# Patient Record
Sex: MALE | Race: BLACK OR AFRICAN AMERICAN | Employment: OTHER | URBAN - METROPOLITAN AREA
[De-identification: names, ages, dates, MRNs, and addresses within clinical notes are randomized per-mention and may not be internally consistent; named-entity substitution may affect disease eponyms.]

---

## 2018-05-07 ENCOUNTER — HOSPITAL ENCOUNTER (OUTPATIENT)
Age: 83
Discharge: HOME | End: 2018-05-07
Payer: MEDICARE

## 2018-05-07 LAB
ALBUMIN SERPL-MCNC: 4.4 G/DL (ref 3.5–5.2)
ALP BLD-CCNC: 97 U/L (ref 40–129)
ALT SERPL-CCNC: 15 U/L (ref 0–40)
AMYLASE: 145 U/L (ref 20–100)
ANION GAP SERPL CALCULATED.3IONS-SCNC: 12 MMOL/L (ref 7–16)
AST SERPL-CCNC: 26 U/L (ref 0–39)
BASOPHILS ABSOLUTE: 0.03 E9/L (ref 0–0.2)
BASOPHILS RELATIVE PERCENT: 0.3 % (ref 0–2)
BILIRUB SERPL-MCNC: 0.7 MG/DL (ref 0–1.2)
BUN BLDV-MCNC: 31 MG/DL (ref 8–23)
CALCIUM SERPL-MCNC: 10.2 MG/DL (ref 8.6–10.2)
CHLORIDE BLD-SCNC: 101 MMOL/L (ref 98–107)
CO2: 28 MMOL/L (ref 22–29)
CREAT SERPL-MCNC: 1.5 MG/DL (ref 0.7–1.2)
EOSINOPHILS ABSOLUTE: 0.05 E9/L (ref 0.05–0.5)
EOSINOPHILS RELATIVE PERCENT: 0.6 % (ref 0–6)
GFR AFRICAN AMERICAN: 53
GFR NON-AFRICAN AMERICAN: 53 ML/MIN/1.73
GLUCOSE BLD-MCNC: 97 MG/DL (ref 74–109)
HCT VFR BLD CALC: 36.5 % (ref 37–54)
HEMOGLOBIN: 11.8 G/DL (ref 12.5–16.5)
IMMATURE GRANULOCYTES #: 0.02 E9/L
IMMATURE GRANULOCYTES %: 0.2 % (ref 0–5)
INR BLD: 1
LIPASE: 35 U/L (ref 13–60)
LYMPHOCYTES ABSOLUTE: 1.04 E9/L (ref 1.5–4)
LYMPHOCYTES RELATIVE PERCENT: 11.8 % (ref 20–42)
MCH RBC QN AUTO: 30 PG (ref 26–35)
MCHC RBC AUTO-ENTMCNC: 32.3 % (ref 32–34.5)
MCV RBC AUTO: 92.9 FL (ref 80–99.9)
MONOCYTES ABSOLUTE: 0.63 E9/L (ref 0.1–0.95)
MONOCYTES RELATIVE PERCENT: 7.1 % (ref 2–12)
NEUTROPHILS ABSOLUTE: 7.07 E9/L (ref 1.8–7.3)
NEUTROPHILS RELATIVE PERCENT: 80 % (ref 43–80)
PDW BLD-RTO: 14.1 FL (ref 11.5–15)
PLATELET # BLD: 196 E9/L (ref 130–450)
PMV BLD AUTO: 11 FL (ref 7–12)
POTASSIUM SERPL-SCNC: 3.9 MMOL/L (ref 3.5–5)
PROTHROMBIN TIME: 12.1 SEC (ref 9.3–12.4)
RBC # BLD: 3.93 E12/L (ref 3.8–5.8)
SODIUM BLD-SCNC: 141 MMOL/L (ref 132–146)
TOTAL PROTEIN: 7.8 G/DL (ref 6.4–8.3)
WBC # BLD: 8.8 E9/L (ref 4.5–11.5)

## 2018-05-07 PROCEDURE — 80053 COMPREHEN METABOLIC PANEL: CPT

## 2018-05-07 PROCEDURE — 82105 ALPHA-FETOPROTEIN SERUM: CPT

## 2018-05-07 PROCEDURE — 85025 COMPLETE CBC W/AUTO DIFF WBC: CPT

## 2018-05-07 PROCEDURE — 36415 COLL VENOUS BLD VENIPUNCTURE: CPT

## 2018-05-07 PROCEDURE — 82150 ASSAY OF AMYLASE: CPT

## 2018-05-07 PROCEDURE — 85610 PROTHROMBIN TIME: CPT

## 2018-05-07 PROCEDURE — 83690 ASSAY OF LIPASE: CPT

## 2018-05-10 LAB — AFP-TUMOR MARKER: 1 NG/ML (ref 0–9)

## 2018-08-27 ENCOUNTER — OFFICE VISIT (OUTPATIENT)
Dept: CARDIOLOGY CLINIC | Age: 83
End: 2018-08-27
Payer: MEDICARE

## 2018-08-27 VITALS
RESPIRATION RATE: 12 BRPM | DIASTOLIC BLOOD PRESSURE: 64 MMHG | HEART RATE: 62 BPM | HEIGHT: 66 IN | BODY MASS INDEX: 26.03 KG/M2 | SYSTOLIC BLOOD PRESSURE: 112 MMHG | WEIGHT: 162 LBS

## 2018-08-27 DIAGNOSIS — I25.10 CORONARY ARTERY DISEASE INVOLVING NATIVE CORONARY ARTERY OF NATIVE HEART WITHOUT ANGINA PECTORIS: Primary | ICD-10-CM

## 2018-08-27 PROCEDURE — 99213 OFFICE O/P EST LOW 20 MIN: CPT | Performed by: INTERNAL MEDICINE

## 2018-08-27 PROCEDURE — 93000 ELECTROCARDIOGRAM COMPLETE: CPT | Performed by: INTERNAL MEDICINE

## 2018-08-27 NOTE — PROGRESS NOTES
[facility]  Gabe Patel M.D. Dona Nelson. Nany Iglesias M.D. Baldev Maria Caro, M.D.  Radha Green. [employer] CompanyMELISSA Velda Basset, M.D. Armen Villarreal M.D.                Karina Arshadapp   8/11/1929  Leandra Leigh MD    This 68-year-old man had outpatient cardiac assessment today. He has a history of hypertension and chronic ischemic heart disease. He has a  history of hypertension and hypertensive heart disease. He has had COPD. He states that except for occasional Rosalea Lowers" that he feels well and goes about his normal activities without symptomatic or functional limitation. He is a  of 9 years. He worked 45 years in the [employer] History:  1. Admission [facility], 2004 with dyspnea and edema. 2. Echo, 2004. Concentric LVH, mild diffuse hypokinesis, EF 50%, Stage II diastolic dysfunction and elevated LA pressure.    3. MPS, 2004. EF 50%, mild global hypokinesis, partially reversible anterior perfusion abnormality. 4. Cardiac catheterization [facility], 08/2004. Normal wall motion, EF 61%, 60% D1 narrowing, 25% OM2 narrowing, 80% narrowing of a RV branch of the dominant RCA.    5. Hypertension with EKG revealing NSR, LVH with repolarization changes.    6. Hemorrhoidectomy.    7. Family history negative for premature vascular disease. 8. Patient smoked for a number of years, but has been abstinent since 1980.    9. No drug allergies.    10. Echo, 12/13/2007. LA 4.2. LVH. EF >55%. RVSP 27. E/E' 12.89.    11. Dobutrex, 01/09/2008. No ST changes from baseline, no chest pain, gated images normal, EF 51%, small partially reversible inferior perfusion abnormality. TID noted. However, abnormalities are felt to be artifactual due to marked gut uptake of radioisotope. Low to intermediate risk study.    12. Mild anemia. Hemoglobin 11.8, WBC 6.8, platelet count [phone]  05/2010. 13. Cataract surgery OD, 08/19/2011. Cataract surgery OS, 10/06/2011. 14.  EGD Dr. Rufino Cueto,

## 2018-09-11 ENCOUNTER — HOSPITAL ENCOUNTER (OUTPATIENT)
Age: 83
Discharge: HOME | End: 2018-09-11
Payer: MEDICARE

## 2018-09-11 LAB
ALBUMIN SERPL-MCNC: 4.2 G/DL (ref 3.5–5.2)
ALP BLD-CCNC: 93 U/L (ref 40–129)
ALT SERPL-CCNC: 12 U/L (ref 0–40)
AMYLASE: 151 U/L (ref 20–100)
ANION GAP SERPL CALCULATED.3IONS-SCNC: 12 MMOL/L (ref 7–16)
AST SERPL-CCNC: 21 U/L (ref 0–39)
BASOPHILS ABSOLUTE: 0.04 E9/L (ref 0–0.2)
BASOPHILS RELATIVE PERCENT: 0.5 % (ref 0–2)
BILIRUB SERPL-MCNC: 0.5 MG/DL (ref 0–1.2)
BUN BLDV-MCNC: 24 MG/DL (ref 8–23)
CALCIUM SERPL-MCNC: 9.8 MG/DL (ref 8.6–10.2)
CHLORIDE BLD-SCNC: 100 MMOL/L (ref 98–107)
CO2: 28 MMOL/L (ref 22–29)
CREAT SERPL-MCNC: 1.3 MG/DL (ref 0.7–1.2)
EOSINOPHILS ABSOLUTE: 0.07 E9/L (ref 0.05–0.5)
EOSINOPHILS RELATIVE PERCENT: 0.9 % (ref 0–6)
GFR AFRICAN AMERICAN: >60
GFR NON-AFRICAN AMERICAN: >60 ML/MIN/1.73
GLUCOSE BLD-MCNC: 97 MG/DL (ref 74–109)
HCT VFR BLD CALC: 38.1 % (ref 37–54)
HEMOGLOBIN: 12.2 G/DL (ref 12.5–16.5)
IMMATURE GRANULOCYTES #: 0.02 E9/L
IMMATURE GRANULOCYTES %: 0.2 % (ref 0–5)
INR BLD: 1.1
LIPASE: 34 U/L (ref 13–60)
LYMPHOCYTES ABSOLUTE: 0.9 E9/L (ref 1.5–4)
LYMPHOCYTES RELATIVE PERCENT: 10.9 % (ref 20–42)
MCH RBC QN AUTO: 29.4 PG (ref 26–35)
MCHC RBC AUTO-ENTMCNC: 32 % (ref 32–34.5)
MCV RBC AUTO: 91.8 FL (ref 80–99.9)
MONOCYTES ABSOLUTE: 0.66 E9/L (ref 0.1–0.95)
MONOCYTES RELATIVE PERCENT: 8 % (ref 2–12)
NEUTROPHILS ABSOLUTE: 6.53 E9/L (ref 1.8–7.3)
NEUTROPHILS RELATIVE PERCENT: 79.5 % (ref 43–80)
PDW BLD-RTO: 13.5 FL (ref 11.5–15)
PLATELET # BLD: 119 E9/L (ref 130–450)
PMV BLD AUTO: 12.3 FL (ref 7–12)
POTASSIUM SERPL-SCNC: 4.5 MMOL/L (ref 3.5–5)
PROTHROMBIN TIME: 12 SEC (ref 9.3–12.4)
RBC # BLD: 4.15 E12/L (ref 3.8–5.8)
SODIUM BLD-SCNC: 140 MMOL/L (ref 132–146)
TOTAL PROTEIN: 7.4 G/DL (ref 6.4–8.3)
WBC # BLD: 8.2 E9/L (ref 4.5–11.5)

## 2018-09-11 PROCEDURE — 85025 COMPLETE CBC W/AUTO DIFF WBC: CPT

## 2018-09-11 PROCEDURE — 36415 COLL VENOUS BLD VENIPUNCTURE: CPT

## 2018-09-11 PROCEDURE — 82150 ASSAY OF AMYLASE: CPT

## 2018-09-11 PROCEDURE — 80053 COMPREHEN METABOLIC PANEL: CPT

## 2018-09-11 PROCEDURE — 85610 PROTHROMBIN TIME: CPT

## 2018-09-11 PROCEDURE — 83690 ASSAY OF LIPASE: CPT

## 2018-10-23 ENCOUNTER — HOSPITAL ENCOUNTER (OUTPATIENT)
Age: 83
Discharge: HOME | End: 2018-10-23
Payer: MEDICARE

## 2018-10-23 LAB
ALBUMIN SERPL-MCNC: 4.1 G/DL (ref 3.5–5.2)
ALP BLD-CCNC: 93 U/L (ref 40–129)
ALT SERPL-CCNC: 15 U/L (ref 0–40)
AMYLASE: 83 U/L (ref 20–100)
ANION GAP SERPL CALCULATED.3IONS-SCNC: 13 MMOL/L (ref 7–16)
AST SERPL-CCNC: 24 U/L (ref 0–39)
BASOPHILS ABSOLUTE: 0.03 E9/L (ref 0–0.2)
BASOPHILS RELATIVE PERCENT: 0.3 % (ref 0–2)
BILIRUB SERPL-MCNC: 0.6 MG/DL (ref 0–1.2)
BUN BLDV-MCNC: 26 MG/DL (ref 8–23)
CALCIUM SERPL-MCNC: 9.9 MG/DL (ref 8.6–10.2)
CHLORIDE BLD-SCNC: 100 MMOL/L (ref 98–107)
CO2: 26 MMOL/L (ref 22–29)
CREAT SERPL-MCNC: 1.6 MG/DL (ref 0.7–1.2)
EOSINOPHILS ABSOLUTE: 0.06 E9/L (ref 0.05–0.5)
EOSINOPHILS RELATIVE PERCENT: 0.7 % (ref 0–6)
GFR AFRICAN AMERICAN: 49
GFR NON-AFRICAN AMERICAN: 49 ML/MIN/1.73
GLUCOSE BLD-MCNC: 103 MG/DL (ref 74–109)
HCT VFR BLD CALC: 36.4 % (ref 37–54)
HEMOGLOBIN: 11.4 G/DL (ref 12.5–16.5)
IMMATURE GRANULOCYTES #: 0.03 E9/L
IMMATURE GRANULOCYTES %: 0.3 % (ref 0–5)
LIPASE: 36 U/L (ref 13–60)
LYMPHOCYTES ABSOLUTE: 0.72 E9/L (ref 1.5–4)
LYMPHOCYTES RELATIVE PERCENT: 8.3 % (ref 20–42)
MCH RBC QN AUTO: 28.8 PG (ref 26–35)
MCHC RBC AUTO-ENTMCNC: 31.3 % (ref 32–34.5)
MCV RBC AUTO: 91.9 FL (ref 80–99.9)
MONOCYTES ABSOLUTE: 0.69 E9/L (ref 0.1–0.95)
MONOCYTES RELATIVE PERCENT: 8 % (ref 2–12)
NEUTROPHILS ABSOLUTE: 7.11 E9/L (ref 1.8–7.3)
NEUTROPHILS RELATIVE PERCENT: 82.4 % (ref 43–80)
PDW BLD-RTO: 13.9 FL (ref 11.5–15)
PLATELET # BLD: 185 E9/L (ref 130–450)
PMV BLD AUTO: 11.2 FL (ref 7–12)
POTASSIUM SERPL-SCNC: 4.1 MMOL/L (ref 3.5–5)
RBC # BLD: 3.96 E12/L (ref 3.8–5.8)
SODIUM BLD-SCNC: 139 MMOL/L (ref 132–146)
TOTAL PROTEIN: 7.7 G/DL (ref 6.4–8.3)
WBC # BLD: 8.6 E9/L (ref 4.5–11.5)

## 2018-10-23 PROCEDURE — 80053 COMPREHEN METABOLIC PANEL: CPT

## 2018-10-23 PROCEDURE — 36415 COLL VENOUS BLD VENIPUNCTURE: CPT

## 2018-10-23 PROCEDURE — 85025 COMPLETE CBC W/AUTO DIFF WBC: CPT

## 2018-10-23 PROCEDURE — 83690 ASSAY OF LIPASE: CPT

## 2018-10-23 PROCEDURE — 82150 ASSAY OF AMYLASE: CPT

## 2019-01-01 ENCOUNTER — TELEPHONE (OUTPATIENT)
Dept: CARDIOLOGY CLINIC | Age: 84
End: 2019-01-01

## 2019-01-01 ENCOUNTER — APPOINTMENT (OUTPATIENT)
Dept: GENERAL RADIOLOGY | Age: 84
DRG: 291 | End: 2019-01-01
Payer: MEDICARE

## 2019-01-01 ENCOUNTER — HOSPITAL ENCOUNTER (INPATIENT)
Age: 84
LOS: 4 days | Discharge: HOME | DRG: 069 | End: 2019-09-02
Attending: EMERGENCY MEDICINE | Admitting: INTERNAL MEDICINE
Payer: MEDICARE

## 2019-01-01 ENCOUNTER — HOSPITAL ENCOUNTER (INPATIENT)
Age: 84
LOS: 7 days | Discharge: HOME | DRG: 291 | End: 2019-08-04
Attending: EMERGENCY MEDICINE | Admitting: INTERNAL MEDICINE
Payer: MEDICARE

## 2019-01-01 ENCOUNTER — APPOINTMENT (OUTPATIENT)
Dept: GENERAL RADIOLOGY | Age: 84
DRG: 069 | End: 2019-01-01
Payer: MEDICARE

## 2019-01-01 ENCOUNTER — APPOINTMENT (OUTPATIENT)
Dept: ULTRASOUND IMAGING | Age: 84
DRG: 069 | End: 2019-01-01
Payer: MEDICARE

## 2019-01-01 ENCOUNTER — APPOINTMENT (OUTPATIENT)
Dept: ULTRASOUND IMAGING | Age: 84
DRG: 291 | End: 2019-01-01
Payer: MEDICARE

## 2019-01-01 ENCOUNTER — ANESTHESIA (OUTPATIENT)
Dept: CARDIAC CATH/INVASIVE PROCEDURES | Age: 84
DRG: 291 | End: 2019-01-01
Payer: MEDICARE

## 2019-01-01 ENCOUNTER — APPOINTMENT (OUTPATIENT)
Dept: NON INVASIVE DIAGNOSTICS | Age: 84
DRG: 291 | End: 2019-01-01
Payer: MEDICARE

## 2019-01-01 ENCOUNTER — ANESTHESIA EVENT (OUTPATIENT)
Dept: CARDIAC CATH/INVASIVE PROCEDURES | Age: 84
DRG: 291 | End: 2019-01-01
Payer: MEDICARE

## 2019-01-01 ENCOUNTER — OFFICE VISIT (OUTPATIENT)
Dept: CARDIOLOGY CLINIC | Age: 84
End: 2019-01-01
Payer: MEDICARE

## 2019-01-01 ENCOUNTER — APPOINTMENT (OUTPATIENT)
Dept: NUCLEAR MEDICINE | Age: 84
DRG: 291 | End: 2019-01-01
Payer: MEDICARE

## 2019-01-01 ENCOUNTER — APPOINTMENT (OUTPATIENT)
Dept: CT IMAGING | Age: 84
DRG: 069 | End: 2019-01-01
Payer: MEDICARE

## 2019-01-01 ENCOUNTER — APPOINTMENT (OUTPATIENT)
Dept: CARDIAC CATH/INVASIVE PROCEDURES | Age: 84
DRG: 291 | End: 2019-01-01
Payer: MEDICARE

## 2019-01-01 VITALS
BODY MASS INDEX: 22.02 KG/M2 | WEIGHT: 137 LBS | OXYGEN SATURATION: 96 % | HEIGHT: 66 IN | SYSTOLIC BLOOD PRESSURE: 115 MMHG | HEART RATE: 79 BPM | DIASTOLIC BLOOD PRESSURE: 51 MMHG | TEMPERATURE: 97.5 F | RESPIRATION RATE: 18 BRPM

## 2019-01-01 VITALS
BODY MASS INDEX: 22.5 KG/M2 | SYSTOLIC BLOOD PRESSURE: 150 MMHG | OXYGEN SATURATION: 100 % | WEIGHT: 140 LBS | DIASTOLIC BLOOD PRESSURE: 78 MMHG | RESPIRATION RATE: 20 BRPM | TEMPERATURE: 98.5 F | HEIGHT: 66 IN | HEART RATE: 84 BPM

## 2019-01-01 VITALS
WEIGHT: 138 LBS | HEART RATE: 62 BPM | SYSTOLIC BLOOD PRESSURE: 108 MMHG | BODY MASS INDEX: 22.18 KG/M2 | DIASTOLIC BLOOD PRESSURE: 50 MMHG | HEIGHT: 66 IN | RESPIRATION RATE: 16 BRPM

## 2019-01-01 VITALS
SYSTOLIC BLOOD PRESSURE: 111 MMHG | RESPIRATION RATE: 29 BRPM | OXYGEN SATURATION: 99 % | DIASTOLIC BLOOD PRESSURE: 59 MMHG

## 2019-01-01 DIAGNOSIS — I25.10 CORONARY ARTERY DISEASE INVOLVING NATIVE CORONARY ARTERY OF NATIVE HEART WITHOUT ANGINA PECTORIS: Primary | ICD-10-CM

## 2019-01-01 DIAGNOSIS — R77.8 ELEVATED TROPONIN: ICD-10-CM

## 2019-01-01 DIAGNOSIS — I50.9 ACUTE ON CHRONIC CONGESTIVE HEART FAILURE, UNSPECIFIED HEART FAILURE TYPE (HCC): Primary | ICD-10-CM

## 2019-01-01 DIAGNOSIS — R33.9 URINARY RETENTION: ICD-10-CM

## 2019-01-01 DIAGNOSIS — E87.20 LACTIC ACIDOSIS: ICD-10-CM

## 2019-01-01 DIAGNOSIS — I48.91 NEW ONSET ATRIAL FIBRILLATION (HCC): ICD-10-CM

## 2019-01-01 DIAGNOSIS — G45.9 TIA (TRANSIENT ISCHEMIC ATTACK): Primary | ICD-10-CM

## 2019-01-01 DIAGNOSIS — N30.01 ACUTE CYSTITIS WITH HEMATURIA: ICD-10-CM

## 2019-01-01 DIAGNOSIS — J18.9 PNEUMONIA DUE TO ORGANISM: ICD-10-CM

## 2019-01-01 LAB
ALBUMIN SERPL-MCNC: 4 G/DL (ref 3.5–5.2)
ALP BLD-CCNC: 91 U/L (ref 40–129)
ALT SERPL-CCNC: 16 U/L (ref 0–40)
AMMONIA: 36 UMOL/L (ref 16–60)
ANION GAP SERPL CALCULATED.3IONS-SCNC: 10 MMOL/L (ref 7–16)
ANION GAP SERPL CALCULATED.3IONS-SCNC: 11 MMOL/L (ref 7–16)
ANION GAP SERPL CALCULATED.3IONS-SCNC: 11 MMOL/L (ref 7–16)
ANION GAP SERPL CALCULATED.3IONS-SCNC: 12 MMOL/L (ref 7–16)
ANION GAP SERPL CALCULATED.3IONS-SCNC: 12 MMOL/L (ref 7–16)
ANION GAP SERPL CALCULATED.3IONS-SCNC: 13 MMOL/L (ref 7–16)
ANION GAP SERPL CALCULATED.3IONS-SCNC: 14 MMOL/L (ref 7–16)
ANION GAP SERPL CALCULATED.3IONS-SCNC: 16 MMOL/L (ref 7–16)
ANION GAP SERPL CALCULATED.3IONS-SCNC: 16 MMOL/L (ref 7–16)
ANION GAP SERPL CALCULATED.3IONS-SCNC: 17 MMOL/L (ref 7–16)
ANISOCYTOSIS: ABNORMAL
APTT: 103.2 SEC (ref 24.5–35.1)
APTT: 26.1 SEC (ref 24.5–35.1)
APTT: 34.9 SEC (ref 24.5–35.1)
APTT: 39 SEC (ref 24.5–35.1)
APTT: 52.6 SEC (ref 24.5–35.1)
APTT: 54.9 SEC (ref 24.5–35.1)
APTT: 59.3 SEC (ref 24.5–35.1)
APTT: >240 SEC (ref 24.5–35.1)
AST SERPL-CCNC: 26 U/L (ref 0–39)
BACTERIA: ABNORMAL /HPF
BACTERIA: ABNORMAL /HPF
BASOPHILS ABSOLUTE: 0 E9/L (ref 0–0.2)
BASOPHILS ABSOLUTE: 0.03 E9/L (ref 0–0.2)
BASOPHILS ABSOLUTE: 0.04 E9/L (ref 0–0.2)
BASOPHILS ABSOLUTE: 0.05 E9/L (ref 0–0.2)
BASOPHILS ABSOLUTE: 0.07 E9/L (ref 0–0.2)
BASOPHILS ABSOLUTE: 0.17 E9/L (ref 0–0.2)
BASOPHILS RELATIVE PERCENT: 0.4 % (ref 0–2)
BASOPHILS RELATIVE PERCENT: 0.4 % (ref 0–2)
BASOPHILS RELATIVE PERCENT: 0.5 % (ref 0–2)
BASOPHILS RELATIVE PERCENT: 0.5 % (ref 0–2)
BASOPHILS RELATIVE PERCENT: 0.6 % (ref 0–2)
BASOPHILS RELATIVE PERCENT: 1.7 % (ref 0–2)
BILIRUB SERPL-MCNC: 1 MG/DL (ref 0–1.2)
BILIRUBIN URINE: NEGATIVE
BILIRUBIN URINE: NEGATIVE
BLOOD, URINE: ABNORMAL
BLOOD, URINE: NEGATIVE
BUN BLDV-MCNC: 16 MG/DL (ref 8–23)
BUN BLDV-MCNC: 17 MG/DL (ref 8–23)
BUN BLDV-MCNC: 18 MG/DL (ref 8–23)
BUN BLDV-MCNC: 19 MG/DL (ref 8–23)
BUN BLDV-MCNC: 22 MG/DL (ref 8–23)
BUN BLDV-MCNC: 23 MG/DL (ref 8–23)
BUN BLDV-MCNC: 23 MG/DL (ref 8–23)
BUN BLDV-MCNC: 25 MG/DL (ref 8–23)
BUN BLDV-MCNC: 26 MG/DL (ref 8–23)
BUN BLDV-MCNC: 27 MG/DL (ref 8–23)
BUN BLDV-MCNC: 31 MG/DL (ref 8–23)
BUN BLDV-MCNC: 34 MG/DL (ref 8–23)
BUN BLDV-MCNC: 35 MG/DL (ref 8–23)
BUN BLDV-MCNC: 41 MG/DL (ref 8–23)
BURR CELLS: ABNORMAL
CALCIUM SERPL-MCNC: 10 MG/DL (ref 8.6–10.2)
CALCIUM SERPL-MCNC: 8.7 MG/DL (ref 8.6–10.2)
CALCIUM SERPL-MCNC: 8.9 MG/DL (ref 8.6–10.2)
CALCIUM SERPL-MCNC: 9.1 MG/DL (ref 8.6–10.2)
CALCIUM SERPL-MCNC: 9.2 MG/DL (ref 8.6–10.2)
CALCIUM SERPL-MCNC: 9.2 MG/DL (ref 8.6–10.2)
CALCIUM SERPL-MCNC: 9.3 MG/DL (ref 8.6–10.2)
CALCIUM SERPL-MCNC: 9.3 MG/DL (ref 8.6–10.2)
CALCIUM SERPL-MCNC: 9.4 MG/DL (ref 8.6–10.2)
CALCIUM SERPL-MCNC: 9.5 MG/DL (ref 8.6–10.2)
CALCIUM SERPL-MCNC: 9.5 MG/DL (ref 8.6–10.2)
CALCIUM SERPL-MCNC: 9.9 MG/DL (ref 8.6–10.2)
CHLORIDE BLD-SCNC: 100 MMOL/L (ref 98–107)
CHLORIDE BLD-SCNC: 101 MMOL/L (ref 98–107)
CHLORIDE BLD-SCNC: 101 MMOL/L (ref 98–107)
CHLORIDE BLD-SCNC: 102 MMOL/L (ref 98–107)
CHLORIDE BLD-SCNC: 102 MMOL/L (ref 98–107)
CHLORIDE BLD-SCNC: 103 MMOL/L (ref 98–107)
CHLORIDE BLD-SCNC: 97 MMOL/L (ref 98–107)
CHLORIDE BLD-SCNC: 99 MMOL/L (ref 98–107)
CLARITY: CLEAR
CLARITY: CLEAR
CO2: 23 MMOL/L (ref 22–29)
CO2: 23 MMOL/L (ref 22–29)
CO2: 25 MMOL/L (ref 22–29)
CO2: 26 MMOL/L (ref 22–29)
CO2: 27 MMOL/L (ref 22–29)
CO2: 28 MMOL/L (ref 22–29)
CO2: 28 MMOL/L (ref 22–29)
CO2: 29 MMOL/L (ref 22–29)
COLOR: YELLOW
COLOR: YELLOW
CREAT SERPL-MCNC: 1.2 MG/DL (ref 0.7–1.2)
CREAT SERPL-MCNC: 1.3 MG/DL (ref 0.7–1.2)
CREAT SERPL-MCNC: 1.3 MG/DL (ref 0.7–1.2)
CREAT SERPL-MCNC: 1.4 MG/DL (ref 0.7–1.2)
CREAT SERPL-MCNC: 1.5 MG/DL (ref 0.7–1.2)
CREAT SERPL-MCNC: 1.6 MG/DL (ref 0.7–1.2)
CREAT SERPL-MCNC: 1.7 MG/DL (ref 0.7–1.2)
CREAT SERPL-MCNC: 1.9 MG/DL (ref 0.7–1.2)
EKG ATRIAL RATE: 250 BPM
EKG ATRIAL RATE: 258 BPM
EKG ATRIAL RATE: 80 BPM
EKG Q-T INTERVAL: 436 MS
EKG Q-T INTERVAL: 448 MS
EKG Q-T INTERVAL: 464 MS
EKG QRS DURATION: 116 MS
EKG QRS DURATION: 124 MS
EKG QRS DURATION: 124 MS
EKG QTC CALCULATION (BAZETT): 486 MS
EKG QTC CALCULATION (BAZETT): 497 MS
EKG QTC CALCULATION (BAZETT): 522 MS
EKG R AXIS: -21 DEGREES
EKG R AXIS: -24 DEGREES
EKG R AXIS: 13 DEGREES
EKG T AXIS: 110 DEGREES
EKG T AXIS: 113 DEGREES
EKG T AXIS: 120 DEGREES
EKG VENTRICULAR RATE: 71 BPM
EKG VENTRICULAR RATE: 76 BPM
EKG VENTRICULAR RATE: 78 BPM
EOSINOPHILS ABSOLUTE: 0.04 E9/L (ref 0.05–0.5)
EOSINOPHILS ABSOLUTE: 0.32 E9/L (ref 0.05–0.5)
EOSINOPHILS ABSOLUTE: 0.33 E9/L (ref 0.05–0.5)
EOSINOPHILS ABSOLUTE: 0.37 E9/L (ref 0.05–0.5)
EOSINOPHILS ABSOLUTE: 0.43 E9/L (ref 0.05–0.5)
EOSINOPHILS ABSOLUTE: 0.44 E9/L (ref 0.05–0.5)
EOSINOPHILS RELATIVE PERCENT: 0.6 % (ref 0–6)
EOSINOPHILS RELATIVE PERCENT: 3.1 % (ref 0–6)
EOSINOPHILS RELATIVE PERCENT: 3.4 % (ref 0–6)
EOSINOPHILS RELATIVE PERCENT: 3.4 % (ref 0–6)
EOSINOPHILS RELATIVE PERCENT: 4.3 % (ref 0–6)
EOSINOPHILS RELATIVE PERCENT: 4.4 % (ref 0–6)
GFR AFRICAN AMERICAN: 41
GFR AFRICAN AMERICAN: 46
GFR AFRICAN AMERICAN: 49
GFR AFRICAN AMERICAN: 53
GFR AFRICAN AMERICAN: 58
GFR AFRICAN AMERICAN: >60
GFR NON-AFRICAN AMERICAN: 41 ML/MIN/1.73
GFR NON-AFRICAN AMERICAN: 46 ML/MIN/1.73
GFR NON-AFRICAN AMERICAN: 49 ML/MIN/1.73
GFR NON-AFRICAN AMERICAN: 53 ML/MIN/1.73
GFR NON-AFRICAN AMERICAN: 58 ML/MIN/1.73
GFR NON-AFRICAN AMERICAN: >60 ML/MIN/1.73
GLUCOSE BLD-MCNC: 100 MG/DL (ref 74–99)
GLUCOSE BLD-MCNC: 100 MG/DL (ref 74–99)
GLUCOSE BLD-MCNC: 102 MG/DL (ref 74–99)
GLUCOSE BLD-MCNC: 103 MG/DL (ref 74–99)
GLUCOSE BLD-MCNC: 105 MG/DL (ref 74–99)
GLUCOSE BLD-MCNC: 108 MG/DL (ref 74–99)
GLUCOSE BLD-MCNC: 118 MG/DL (ref 74–99)
GLUCOSE BLD-MCNC: 122 MG/DL (ref 74–99)
GLUCOSE BLD-MCNC: 83 MG/DL (ref 74–99)
GLUCOSE BLD-MCNC: 93 MG/DL (ref 74–99)
GLUCOSE BLD-MCNC: 95 MG/DL (ref 74–99)
GLUCOSE BLD-MCNC: 95 MG/DL (ref 74–99)
GLUCOSE BLD-MCNC: 96 MG/DL (ref 74–99)
GLUCOSE BLD-MCNC: 97 MG/DL (ref 74–99)
GLUCOSE URINE: NEGATIVE MG/DL
GLUCOSE URINE: NEGATIVE MG/DL
HCT VFR BLD CALC: 31.2 % (ref 37–54)
HCT VFR BLD CALC: 33.7 % (ref 37–54)
HCT VFR BLD CALC: 33.7 % (ref 37–54)
HCT VFR BLD CALC: 34.8 % (ref 37–54)
HCT VFR BLD CALC: 35.3 % (ref 37–54)
HCT VFR BLD CALC: 36.1 % (ref 37–54)
HCT VFR BLD CALC: 36.3 % (ref 37–54)
HCT VFR BLD CALC: 37.6 % (ref 37–54)
HCT VFR BLD CALC: 38.6 % (ref 37–54)
HCT VFR BLD CALC: 38.7 % (ref 37–54)
HCT VFR BLD CALC: 39.8 % (ref 37–54)
HEMOGLOBIN: 10.1 G/DL (ref 12.5–16.5)
HEMOGLOBIN: 10.4 G/DL (ref 12.5–16.5)
HEMOGLOBIN: 10.8 G/DL (ref 12.5–16.5)
HEMOGLOBIN: 10.8 G/DL (ref 12.5–16.5)
HEMOGLOBIN: 11.2 G/DL (ref 12.5–16.5)
HEMOGLOBIN: 11.3 G/DL (ref 12.5–16.5)
HEMOGLOBIN: 11.5 G/DL (ref 12.5–16.5)
HEMOGLOBIN: 11.7 G/DL (ref 12.5–16.5)
HEMOGLOBIN: 11.9 G/DL (ref 12.5–16.5)
HEMOGLOBIN: 12.3 G/DL (ref 12.5–16.5)
HEMOGLOBIN: 12.4 G/DL (ref 12.5–16.5)
HYPOCHROMIA: ABNORMAL
HYPOCHROMIA: ABNORMAL
IMMATURE GRANULOCYTES #: 0.02 E9/L
IMMATURE GRANULOCYTES #: 0.04 E9/L
IMMATURE GRANULOCYTES #: 0.05 E9/L
IMMATURE GRANULOCYTES #: 0.06 E9/L
IMMATURE GRANULOCYTES %: 0.3 % (ref 0–5)
IMMATURE GRANULOCYTES %: 0.4 % (ref 0–5)
IMMATURE GRANULOCYTES %: 0.5 % (ref 0–5)
IMMATURE GRANULOCYTES %: 0.5 % (ref 0–5)
INR BLD: 1.7
KETONES, URINE: NEGATIVE MG/DL
KETONES, URINE: NEGATIVE MG/DL
LACTIC ACID: 2.3 MMOL/L (ref 0.5–2.2)
LEUKOCYTE ESTERASE, URINE: ABNORMAL
LEUKOCYTE ESTERASE, URINE: NEGATIVE
LV EF: 60 %
LVEF MODALITY: NORMAL
LYMPHOCYTES ABSOLUTE: 0.29 E9/L (ref 1.5–4)
LYMPHOCYTES ABSOLUTE: 0.32 E9/L (ref 1.5–4)
LYMPHOCYTES ABSOLUTE: 0.4 E9/L (ref 1.5–4)
LYMPHOCYTES ABSOLUTE: 0.42 E9/L (ref 1.5–4)
LYMPHOCYTES ABSOLUTE: 0.51 E9/L (ref 1.5–4)
LYMPHOCYTES ABSOLUTE: 0.65 E9/L (ref 1.5–4)
LYMPHOCYTES RELATIVE PERCENT: 3.4 % (ref 20–42)
LYMPHOCYTES RELATIVE PERCENT: 3.5 % (ref 20–42)
LYMPHOCYTES RELATIVE PERCENT: 3.8 % (ref 20–42)
LYMPHOCYTES RELATIVE PERCENT: 4.4 % (ref 20–42)
LYMPHOCYTES RELATIVE PERCENT: 4.7 % (ref 20–42)
LYMPHOCYTES RELATIVE PERCENT: 6.5 % (ref 20–42)
MAGNESIUM: 2 MG/DL (ref 1.6–2.6)
MAGNESIUM: 2.1 MG/DL (ref 1.6–2.6)
MAGNESIUM: 2.2 MG/DL (ref 1.6–2.6)
MCH RBC QN AUTO: 28.7 PG (ref 26–35)
MCH RBC QN AUTO: 28.7 PG (ref 26–35)
MCH RBC QN AUTO: 28.8 PG (ref 26–35)
MCH RBC QN AUTO: 28.8 PG (ref 26–35)
MCH RBC QN AUTO: 28.9 PG (ref 26–35)
MCH RBC QN AUTO: 29 PG (ref 26–35)
MCH RBC QN AUTO: 29 PG (ref 26–35)
MCH RBC QN AUTO: 29.2 PG (ref 26–35)
MCH RBC QN AUTO: 29.2 PG (ref 26–35)
MCH RBC QN AUTO: 29.3 PG (ref 26–35)
MCH RBC QN AUTO: 29.3 PG (ref 26–35)
MCHC RBC AUTO-ENTMCNC: 30.8 % (ref 32–34.5)
MCHC RBC AUTO-ENTMCNC: 30.9 % (ref 32–34.5)
MCHC RBC AUTO-ENTMCNC: 30.9 % (ref 32–34.5)
MCHC RBC AUTO-ENTMCNC: 31 % (ref 32–34.5)
MCHC RBC AUTO-ENTMCNC: 31.1 % (ref 32–34.5)
MCHC RBC AUTO-ENTMCNC: 31.1 % (ref 32–34.5)
MCHC RBC AUTO-ENTMCNC: 31.7 % (ref 32–34.5)
MCHC RBC AUTO-ENTMCNC: 31.9 % (ref 32–34.5)
MCHC RBC AUTO-ENTMCNC: 32 % (ref 32–34.5)
MCHC RBC AUTO-ENTMCNC: 32 % (ref 32–34.5)
MCHC RBC AUTO-ENTMCNC: 32.4 % (ref 32–34.5)
MCV RBC AUTO: 90.4 FL (ref 80–99.9)
MCV RBC AUTO: 90.6 FL (ref 80–99.9)
MCV RBC AUTO: 91.3 FL (ref 80–99.9)
MCV RBC AUTO: 91.9 FL (ref 80–99.9)
MCV RBC AUTO: 91.9 FL (ref 80–99.9)
MCV RBC AUTO: 92.6 FL (ref 80–99.9)
MCV RBC AUTO: 92.6 FL (ref 80–99.9)
MCV RBC AUTO: 92.8 FL (ref 80–99.9)
MCV RBC AUTO: 92.8 FL (ref 80–99.9)
MCV RBC AUTO: 93.2 FL (ref 80–99.9)
MCV RBC AUTO: 94.1 FL (ref 80–99.9)
METER GLUCOSE: 116 MG/DL (ref 74–99)
MONOCYTES ABSOLUTE: 0.38 E9/L (ref 0.1–0.95)
MONOCYTES ABSOLUTE: 0.56 E9/L (ref 0.1–0.95)
MONOCYTES ABSOLUTE: 0.67 E9/L (ref 0.1–0.95)
MONOCYTES ABSOLUTE: 0.72 E9/L (ref 0.1–0.95)
MONOCYTES ABSOLUTE: 0.8 E9/L (ref 0.1–0.95)
MONOCYTES ABSOLUTE: 0.81 E9/L (ref 0.1–0.95)
MONOCYTES RELATIVE PERCENT: 4.2 % (ref 2–12)
MONOCYTES RELATIVE PERCENT: 6.2 % (ref 2–12)
MONOCYTES RELATIVE PERCENT: 6.5 % (ref 2–12)
MONOCYTES RELATIVE PERCENT: 7.8 % (ref 2–12)
MONOCYTES RELATIVE PERCENT: 7.8 % (ref 2–12)
MONOCYTES RELATIVE PERCENT: 8 % (ref 2–12)
NEUTROPHILS ABSOLUTE: 6.23 E9/L (ref 1.8–7.3)
NEUTROPHILS ABSOLUTE: 8.07 E9/L (ref 1.8–7.3)
NEUTROPHILS ABSOLUTE: 8.27 E9/L (ref 1.8–7.3)
NEUTROPHILS ABSOLUTE: 8.3 E9/L (ref 1.8–7.3)
NEUTROPHILS ABSOLUTE: 9.19 E9/L (ref 1.8–7.3)
NEUTROPHILS ABSOLUTE: 9.43 E9/L (ref 1.8–7.3)
NEUTROPHILS RELATIVE PERCENT: 80.1 % (ref 43–80)
NEUTROPHILS RELATIVE PERCENT: 82.6 % (ref 43–80)
NEUTROPHILS RELATIVE PERCENT: 85 % (ref 43–80)
NEUTROPHILS RELATIVE PERCENT: 85.4 % (ref 43–80)
NEUTROPHILS RELATIVE PERCENT: 86.5 % (ref 43–80)
NEUTROPHILS RELATIVE PERCENT: 87.3 % (ref 43–80)
NITRITE, URINE: NEGATIVE
NITRITE, URINE: POSITIVE
OVALOCYTES: ABNORMAL
PDW BLD-RTO: 15.1 FL (ref 11.5–15)
PDW BLD-RTO: 15.3 FL (ref 11.5–15)
PDW BLD-RTO: 15.4 FL (ref 11.5–15)
PDW BLD-RTO: 15.5 FL (ref 11.5–15)
PDW BLD-RTO: 15.6 FL (ref 11.5–15)
PDW BLD-RTO: 15.6 FL (ref 11.5–15)
PDW BLD-RTO: 15.7 FL (ref 11.5–15)
PDW BLD-RTO: 15.8 FL (ref 11.5–15)
PDW BLD-RTO: 15.8 FL (ref 11.5–15)
PDW BLD-RTO: 15.9 FL (ref 11.5–15)
PDW BLD-RTO: 16.6 FL (ref 11.5–15)
PH UA: 6 (ref 5–9)
PH UA: 6 (ref 5–9)
PLATELET # BLD: 179 E9/L (ref 130–450)
PLATELET # BLD: 197 E9/L (ref 130–450)
PLATELET # BLD: 204 E9/L (ref 130–450)
PLATELET # BLD: 205 E9/L (ref 130–450)
PLATELET # BLD: 210 E9/L (ref 130–450)
PLATELET # BLD: 212 E9/L (ref 130–450)
PLATELET # BLD: 214 E9/L (ref 130–450)
PLATELET # BLD: 220 E9/L (ref 130–450)
PLATELET # BLD: 222 E9/L (ref 130–450)
PLATELET # BLD: 222 E9/L (ref 130–450)
PLATELET # BLD: 228 E9/L (ref 130–450)
PLATELET # BLD: 231 E9/L (ref 130–450)
PLATELET # BLD: 252 E9/L (ref 130–450)
PLATELET # BLD: 269 E9/L (ref 130–450)
PMV BLD AUTO: 10.2 FL (ref 7–12)
PMV BLD AUTO: 10.2 FL (ref 7–12)
PMV BLD AUTO: 10.3 FL (ref 7–12)
PMV BLD AUTO: 10.4 FL (ref 7–12)
PMV BLD AUTO: 10.5 FL (ref 7–12)
PMV BLD AUTO: 10.5 FL (ref 7–12)
PMV BLD AUTO: 10.7 FL (ref 7–12)
PMV BLD AUTO: 10.7 FL (ref 7–12)
PMV BLD AUTO: 10.8 FL (ref 7–12)
PMV BLD AUTO: 11.3 FL (ref 7–12)
PMV BLD AUTO: 11.8 FL (ref 7–12)
POIKILOCYTES: ABNORMAL
POLYCHROMASIA: ABNORMAL
POTASSIUM REFLEX MAGNESIUM: 3 MMOL/L (ref 3.5–5)
POTASSIUM SERPL-SCNC: 3.4 MMOL/L (ref 3.5–5)
POTASSIUM SERPL-SCNC: 3.5 MMOL/L (ref 3.5–5)
POTASSIUM SERPL-SCNC: 3.8 MMOL/L (ref 3.5–5)
POTASSIUM SERPL-SCNC: 4 MMOL/L (ref 3.5–5)
POTASSIUM SERPL-SCNC: 4 MMOL/L (ref 3.5–5)
POTASSIUM SERPL-SCNC: 4.1 MMOL/L (ref 3.5–5)
POTASSIUM SERPL-SCNC: 4.3 MMOL/L (ref 3.5–5)
POTASSIUM SERPL-SCNC: 4.4 MMOL/L (ref 3.5–5)
POTASSIUM SERPL-SCNC: 4.5 MMOL/L (ref 3.5–5)
POTASSIUM SERPL-SCNC: 4.5 MMOL/L (ref 3.5–5)
POTASSIUM SERPL-SCNC: 4.9 MMOL/L (ref 3.5–5)
POTASSIUM SERPL-SCNC: 5 MMOL/L (ref 3.5–5)
POTASSIUM SERPL-SCNC: 6.2 MMOL/L (ref 3.5–5)
PRO-BNP: ABNORMAL PG/ML (ref 0–450)
PRO-BNP: ABNORMAL PG/ML (ref 0–450)
PROCALCITONIN: 0.07 NG/ML (ref 0–0.08)
PROMYELOCYTES PERCENT: 1.7 % (ref 0–0)
PROTEIN UA: 30 MG/DL
PROTEIN UA: 30 MG/DL
PROTHROMBIN TIME: 19.4 SEC (ref 9.3–12.4)
RBC # BLD: 3.45 E12/L (ref 3.8–5.8)
RBC # BLD: 3.63 E12/L (ref 3.8–5.8)
RBC # BLD: 3.72 E12/L (ref 3.8–5.8)
RBC # BLD: 3.76 E12/L (ref 3.8–5.8)
RBC # BLD: 3.84 E12/L (ref 3.8–5.8)
RBC # BLD: 3.92 E12/L (ref 3.8–5.8)
RBC # BLD: 3.93 E12/L (ref 3.8–5.8)
RBC # BLD: 4.05 E12/L (ref 3.8–5.8)
RBC # BLD: 4.1 E12/L (ref 3.8–5.8)
RBC # BLD: 4.24 E12/L (ref 3.8–5.8)
RBC # BLD: 4.27 E12/L (ref 3.8–5.8)
RBC UA: ABNORMAL /HPF (ref 0–2)
RBC UA: ABNORMAL /HPF (ref 0–2)
SCHISTOCYTES: ABNORMAL
SCHISTOCYTES: ABNORMAL
SODIUM BLD-SCNC: 138 MMOL/L (ref 132–146)
SODIUM BLD-SCNC: 139 MMOL/L (ref 132–146)
SODIUM BLD-SCNC: 139 MMOL/L (ref 132–146)
SODIUM BLD-SCNC: 140 MMOL/L (ref 132–146)
SODIUM BLD-SCNC: 142 MMOL/L (ref 132–146)
SODIUM BLD-SCNC: 145 MMOL/L (ref 132–146)
SODIUM BLD-SCNC: 147 MMOL/L (ref 132–146)
SPECIFIC GRAVITY UA: 1.01 (ref 1–1.03)
SPECIFIC GRAVITY UA: 1.01 (ref 1–1.03)
TARGET CELLS: ABNORMAL
TOTAL PROTEIN: 7.8 G/DL (ref 6.4–8.3)
TROPONIN: 0.05 NG/ML (ref 0–0.03)
TROPONIN: 0.08 NG/ML (ref 0–0.03)
TSH SERPL DL<=0.05 MIU/L-ACNC: 6.3 UIU/ML (ref 0.27–4.2)
UROBILINOGEN, URINE: 2 E.U./DL
UROBILINOGEN, URINE: 2 E.U./DL
VITAMIN B-12: 276 PG/ML (ref 211–946)
WBC # BLD: 10 E9/L (ref 4.5–11.5)
WBC # BLD: 10.1 E9/L (ref 4.5–11.5)
WBC # BLD: 10.8 E9/L (ref 4.5–11.5)
WBC # BLD: 11 E9/L (ref 4.5–11.5)
WBC # BLD: 6.9 E9/L (ref 4.5–11.5)
WBC # BLD: 7.2 E9/L (ref 4.5–11.5)
WBC # BLD: 7.7 E9/L (ref 4.5–11.5)
WBC # BLD: 7.8 E9/L (ref 4.5–11.5)
WBC # BLD: 8.5 E9/L (ref 4.5–11.5)
WBC # BLD: 9.2 E9/L (ref 4.5–11.5)
WBC # BLD: 9.5 E9/L (ref 4.5–11.5)
WBC UA: ABNORMAL /HPF (ref 0–5)
WBC UA: ABNORMAL /HPF (ref 0–5)

## 2019-01-01 PROCEDURE — 94760 N-INVAS EAR/PLS OXIMETRY 1: CPT

## 2019-01-01 PROCEDURE — 2700000000 HC OXYGEN THERAPY PER DAY

## 2019-01-01 PROCEDURE — 36415 COLL VENOUS BLD VENIPUNCTURE: CPT

## 2019-01-01 PROCEDURE — 83880 ASSAY OF NATRIURETIC PEPTIDE: CPT

## 2019-01-01 PROCEDURE — 99232 SBSQ HOSP IP/OBS MODERATE 35: CPT | Performed by: INTERNAL MEDICINE

## 2019-01-01 PROCEDURE — 85730 THROMBOPLASTIN TIME PARTIAL: CPT

## 2019-01-01 PROCEDURE — 84145 PROCALCITONIN (PCT): CPT

## 2019-01-01 PROCEDURE — 2580000003 HC RX 258: Performed by: INTERNAL MEDICINE

## 2019-01-01 PROCEDURE — 94664 DEMO&/EVAL PT USE INHALER: CPT

## 2019-01-01 PROCEDURE — 83735 ASSAY OF MAGNESIUM: CPT

## 2019-01-01 PROCEDURE — 85027 COMPLETE CBC AUTOMATED: CPT

## 2019-01-01 PROCEDURE — 84132 ASSAY OF SERUM POTASSIUM: CPT

## 2019-01-01 PROCEDURE — 6370000000 HC RX 637 (ALT 250 FOR IP): Performed by: INTERNAL MEDICINE

## 2019-01-01 PROCEDURE — 96365 THER/PROPH/DIAG IV INF INIT: CPT

## 2019-01-01 PROCEDURE — 2500000003 HC RX 250 WO HCPCS: Performed by: INTERNAL MEDICINE

## 2019-01-01 PROCEDURE — 6360000002 HC RX W HCPCS: Performed by: NURSE ANESTHETIST, CERTIFIED REGISTERED

## 2019-01-01 PROCEDURE — 71046 X-RAY EXAM CHEST 2 VIEWS: CPT

## 2019-01-01 PROCEDURE — 85049 AUTOMATED PLATELET COUNT: CPT

## 2019-01-01 PROCEDURE — 3700000001 HC ADD 15 MINUTES (ANESTHESIA)

## 2019-01-01 PROCEDURE — 6360000002 HC RX W HCPCS: Performed by: STUDENT IN AN ORGANIZED HEALTH CARE EDUCATION/TRAINING PROGRAM

## 2019-01-01 PROCEDURE — 93010 ELECTROCARDIOGRAM REPORT: CPT | Performed by: INTERNAL MEDICINE

## 2019-01-01 PROCEDURE — 99223 1ST HOSP IP/OBS HIGH 75: CPT | Performed by: INTERNAL MEDICINE

## 2019-01-01 PROCEDURE — 94640 AIRWAY INHALATION TREATMENT: CPT

## 2019-01-01 PROCEDURE — G0378 HOSPITAL OBSERVATION PER HR: HCPCS

## 2019-01-01 PROCEDURE — 83605 ASSAY OF LACTIC ACID: CPT

## 2019-01-01 PROCEDURE — 71045 X-RAY EXAM CHEST 1 VIEW: CPT

## 2019-01-01 PROCEDURE — 93005 ELECTROCARDIOGRAM TRACING: CPT | Performed by: EMERGENCY MEDICINE

## 2019-01-01 PROCEDURE — 93005 ELECTROCARDIOGRAM TRACING: CPT | Performed by: STUDENT IN AN ORGANIZED HEALTH CARE EDUCATION/TRAINING PROGRAM

## 2019-01-01 PROCEDURE — 80048 BASIC METABOLIC PNL TOTAL CA: CPT

## 2019-01-01 PROCEDURE — 93016 CV STRESS TEST SUPVJ ONLY: CPT | Performed by: INTERNAL MEDICINE

## 2019-01-01 PROCEDURE — 6360000002 HC RX W HCPCS: Performed by: INTERNAL MEDICINE

## 2019-01-01 PROCEDURE — 81001 URINALYSIS AUTO W/SCOPE: CPT

## 2019-01-01 PROCEDURE — 2709999900 HC NON-CHARGEABLE SUPPLY

## 2019-01-01 PROCEDURE — 93017 CV STRESS TEST TRACING ONLY: CPT

## 2019-01-01 PROCEDURE — 99285 EMERGENCY DEPT VISIT HI MDM: CPT

## 2019-01-01 PROCEDURE — 85025 COMPLETE CBC W/AUTO DIFF WBC: CPT

## 2019-01-01 PROCEDURE — B24BZZ4 ULTRASONOGRAPHY OF HEART WITH AORTA, TRANSESOPHAGEAL: ICD-10-PCS | Performed by: INTERNAL MEDICINE

## 2019-01-01 PROCEDURE — 93005 ELECTROCARDIOGRAM TRACING: CPT | Performed by: NURSE PRACTITIONER

## 2019-01-01 PROCEDURE — 76870 US EXAM SCROTUM: CPT

## 2019-01-01 PROCEDURE — 93000 ELECTROCARDIOGRAM COMPLETE: CPT | Performed by: INTERNAL MEDICINE

## 2019-01-01 PROCEDURE — 3430000000 HC RX DIAGNOSTIC RADIOPHARMACEUTICAL: Performed by: RADIOLOGY

## 2019-01-01 PROCEDURE — 84443 ASSAY THYROID STIM HORMONE: CPT

## 2019-01-01 PROCEDURE — 96374 THER/PROPH/DIAG INJ IV PUSH: CPT

## 2019-01-01 PROCEDURE — 76536 US EXAM OF HEAD AND NECK: CPT

## 2019-01-01 PROCEDURE — 82962 GLUCOSE BLOOD TEST: CPT

## 2019-01-01 PROCEDURE — 2060000000 HC ICU INTERMEDIATE R&B

## 2019-01-01 PROCEDURE — 97530 THERAPEUTIC ACTIVITIES: CPT

## 2019-01-01 PROCEDURE — 82607 VITAMIN B-12: CPT

## 2019-01-01 PROCEDURE — 99233 SBSQ HOSP IP/OBS HIGH 50: CPT | Performed by: INTERNAL MEDICINE

## 2019-01-01 PROCEDURE — 93325 DOPPLER ECHO COLOR FLOW MAPG: CPT

## 2019-01-01 PROCEDURE — 76770 US EXAM ABDO BACK WALL COMP: CPT

## 2019-01-01 PROCEDURE — APPSS45 APP SPLIT SHARED TIME 31-45 MINUTES: Performed by: PHYSICIAN ASSISTANT

## 2019-01-01 PROCEDURE — 93321 DOPPLER ECHO F-UP/LMTD STD: CPT

## 2019-01-01 PROCEDURE — 78452 HT MUSCLE IMAGE SPECT MULT: CPT

## 2019-01-01 PROCEDURE — 94761 N-INVAS EAR/PLS OXIMETRY MLT: CPT

## 2019-01-01 PROCEDURE — B246ZZ4 ULTRASONOGRAPHY OF RIGHT AND LEFT HEART, TRANSESOPHAGEAL: ICD-10-PCS | Performed by: INTERNAL MEDICINE

## 2019-01-01 PROCEDURE — 97161 PT EVAL LOW COMPLEX 20 MIN: CPT

## 2019-01-01 PROCEDURE — 5A2204Z RESTORATION OF CARDIAC RHYTHM, SINGLE: ICD-10-PCS | Performed by: INTERNAL MEDICINE

## 2019-01-01 PROCEDURE — 97165 OT EVAL LOW COMPLEX 30 MIN: CPT

## 2019-01-01 PROCEDURE — 51702 INSERT TEMP BLADDER CATH: CPT

## 2019-01-01 PROCEDURE — 92610 EVALUATE SWALLOWING FUNCTION: CPT

## 2019-01-01 PROCEDURE — A9500 TC99M SESTAMIBI: HCPCS | Performed by: RADIOLOGY

## 2019-01-01 PROCEDURE — 2580000003 HC RX 258: Performed by: NURSE ANESTHETIST, CERTIFIED REGISTERED

## 2019-01-01 PROCEDURE — 93018 CV STRESS TEST I&R ONLY: CPT | Performed by: INTERNAL MEDICINE

## 2019-01-01 PROCEDURE — 97166 OT EVAL MOD COMPLEX 45 MIN: CPT

## 2019-01-01 PROCEDURE — 3700000000 HC ANESTHESIA ATTENDED CARE

## 2019-01-01 PROCEDURE — 85610 PROTHROMBIN TIME: CPT

## 2019-01-01 PROCEDURE — 88112 CYTOPATH CELL ENHANCE TECH: CPT

## 2019-01-01 PROCEDURE — 99221 1ST HOSP IP/OBS SF/LOW 40: CPT | Performed by: PSYCHIATRY & NEUROLOGY

## 2019-01-01 PROCEDURE — 99222 1ST HOSP IP/OBS MODERATE 55: CPT | Performed by: INTERNAL MEDICINE

## 2019-01-01 PROCEDURE — 70450 CT HEAD/BRAIN W/O DYE: CPT

## 2019-01-01 PROCEDURE — APPSS60 APP SPLIT SHARED TIME 46-60 MINUTES: Performed by: NURSE PRACTITIONER

## 2019-01-01 PROCEDURE — 6360000002 HC RX W HCPCS: Performed by: EMERGENCY MEDICINE

## 2019-01-01 PROCEDURE — 82140 ASSAY OF AMMONIA: CPT

## 2019-01-01 PROCEDURE — 2580000003 HC RX 258: Performed by: STUDENT IN AN ORGANIZED HEALTH CARE EDUCATION/TRAINING PROGRAM

## 2019-01-01 PROCEDURE — 84484 ASSAY OF TROPONIN QUANT: CPT

## 2019-01-01 PROCEDURE — 80053 COMPREHEN METABOLIC PANEL: CPT

## 2019-01-01 PROCEDURE — 97535 SELF CARE MNGMENT TRAINING: CPT

## 2019-01-01 PROCEDURE — 93312 ECHO TRANSESOPHAGEAL: CPT

## 2019-01-01 PROCEDURE — 99213 OFFICE O/P EST LOW 20 MIN: CPT | Performed by: INTERNAL MEDICINE

## 2019-01-01 PROCEDURE — 2500000003 HC RX 250 WO HCPCS: Performed by: STUDENT IN AN ORGANIZED HEALTH CARE EDUCATION/TRAINING PROGRAM

## 2019-01-01 RX ORDER — IPRATROPIUM BROMIDE AND ALBUTEROL SULFATE 2.5; .5 MG/3ML; MG/3ML
3 SOLUTION RESPIRATORY (INHALATION) EVERY 4 HOURS
Qty: 360 ML | Refills: 5 | Status: SHIPPED | OUTPATIENT
Start: 2019-01-01

## 2019-01-01 RX ORDER — LEVOTHYROXINE SODIUM 112 UG/1
112 TABLET ORAL DAILY
Status: DISCONTINUED | OUTPATIENT
Start: 2019-01-01 | End: 2019-01-01 | Stop reason: HOSPADM

## 2019-01-01 RX ORDER — DOCUSATE SODIUM 100 MG/1
100 CAPSULE, LIQUID FILLED ORAL DAILY
Status: DISCONTINUED | OUTPATIENT
Start: 2019-01-01 | End: 2019-01-01 | Stop reason: HOSPADM

## 2019-01-01 RX ORDER — POLYVINYL ALCOHOL 14 MG/ML
1 SOLUTION/ DROPS OPHTHALMIC PRN
Status: DISCONTINUED | OUTPATIENT
Start: 2019-01-01 | End: 2019-01-01 | Stop reason: HOSPADM

## 2019-01-01 RX ORDER — FUROSEMIDE 10 MG/ML
40 INJECTION INTRAMUSCULAR; INTRAVENOUS ONCE
Status: DISCONTINUED | OUTPATIENT
Start: 2019-01-01 | End: 2019-01-01 | Stop reason: SDUPTHER

## 2019-01-01 RX ORDER — POLYETHYLENE GLYCOL 3350 17 G/17G
17 POWDER, FOR SOLUTION ORAL DAILY PRN
Status: DISCONTINUED | OUTPATIENT
Start: 2019-01-01 | End: 2019-01-01 | Stop reason: HOSPADM

## 2019-01-01 RX ORDER — CLONIDINE HYDROCHLORIDE 0.1 MG/1
0.1 TABLET ORAL 2 TIMES DAILY
Qty: 60 TABLET | Refills: 3 | Status: SHIPPED | OUTPATIENT
Start: 2019-01-01

## 2019-01-01 RX ORDER — PROPOFOL 10 MG/ML
INJECTION, EMULSION INTRAVENOUS PRN
Status: DISCONTINUED | OUTPATIENT
Start: 2019-01-01 | End: 2019-01-01 | Stop reason: SDUPTHER

## 2019-01-01 RX ORDER — LACTULOSE 10 G/15ML
20 SOLUTION ORAL 3 TIMES DAILY
Qty: 1 BOTTLE | Refills: 1 | Status: ON HOLD | OUTPATIENT
Start: 2019-01-01 | End: 2020-01-01

## 2019-01-01 RX ORDER — FUROSEMIDE 20 MG/1
20 TABLET ORAL DAILY
Status: DISCONTINUED | OUTPATIENT
Start: 2019-01-01 | End: 2019-01-01

## 2019-01-01 RX ORDER — MIRTAZAPINE 15 MG/1
15 TABLET, FILM COATED ORAL NIGHTLY
Status: DISCONTINUED | OUTPATIENT
Start: 2019-01-01 | End: 2019-01-01 | Stop reason: HOSPADM

## 2019-01-01 RX ORDER — ENALAPRIL MALEATE 10 MG/1
10 TABLET ORAL DAILY
Status: DISCONTINUED | OUTPATIENT
Start: 2019-01-01 | End: 2019-01-01

## 2019-01-01 RX ORDER — SODIUM CHLORIDE 0.9 % (FLUSH) 0.9 %
10 SYRINGE (ML) INJECTION EVERY 12 HOURS SCHEDULED
Status: DISCONTINUED | OUTPATIENT
Start: 2019-01-01 | End: 2019-01-01 | Stop reason: HOSPADM

## 2019-01-01 RX ORDER — AMLODIPINE BESYLATE 5 MG/1
5 TABLET ORAL DAILY
Status: DISCONTINUED | OUTPATIENT
Start: 2019-01-01 | End: 2019-01-01 | Stop reason: HOSPADM

## 2019-01-01 RX ORDER — LORAZEPAM 1 MG/1
1 TABLET ORAL 2 TIMES DAILY PRN
Status: DISCONTINUED | OUTPATIENT
Start: 2019-01-01 | End: 2019-01-01 | Stop reason: HOSPADM

## 2019-01-01 RX ORDER — AMLODIPINE BESYLATE 5 MG/1
5 TABLET ORAL DAILY
Status: DISCONTINUED | OUTPATIENT
Start: 2019-01-01 | End: 2019-01-01

## 2019-01-01 RX ORDER — HYDRALAZINE HYDROCHLORIDE 25 MG/1
25 TABLET, FILM COATED ORAL EVERY 12 HOURS SCHEDULED
Status: DISCONTINUED | OUTPATIENT
Start: 2019-01-01 | End: 2019-01-01 | Stop reason: HOSPADM

## 2019-01-01 RX ORDER — HEPARIN SODIUM 1000 [USP'U]/ML
30 INJECTION, SOLUTION INTRAVENOUS; SUBCUTANEOUS PRN
Status: DISCONTINUED | OUTPATIENT
Start: 2019-01-01 | End: 2019-01-01 | Stop reason: HOSPADM

## 2019-01-01 RX ORDER — METOPROLOL TARTRATE 50 MG/1
50 TABLET, FILM COATED ORAL 2 TIMES DAILY
Status: DISCONTINUED | OUTPATIENT
Start: 2019-01-01 | End: 2019-01-01 | Stop reason: HOSPADM

## 2019-01-01 RX ORDER — SODIUM CHLORIDE 9 MG/ML
INJECTION, SOLUTION INTRAVENOUS CONTINUOUS PRN
Status: DISCONTINUED | OUTPATIENT
Start: 2019-01-01 | End: 2019-01-01 | Stop reason: SDUPTHER

## 2019-01-01 RX ORDER — IPRATROPIUM BROMIDE AND ALBUTEROL SULFATE 2.5; .5 MG/3ML; MG/3ML
1 SOLUTION RESPIRATORY (INHALATION) EVERY 4 HOURS
Status: DISCONTINUED | OUTPATIENT
Start: 2019-01-01 | End: 2019-01-01 | Stop reason: HOSPADM

## 2019-01-01 RX ORDER — HYDRALAZINE HYDROCHLORIDE 25 MG/1
25 TABLET, FILM COATED ORAL 3 TIMES DAILY
Status: DISCONTINUED | OUTPATIENT
Start: 2019-01-01 | End: 2019-01-01

## 2019-01-01 RX ORDER — LACTOBACILLUS RHAMNOSUS GG 10B CELL
1 CAPSULE ORAL DAILY
Status: DISCONTINUED | OUTPATIENT
Start: 2019-01-01 | End: 2019-01-01 | Stop reason: HOSPADM

## 2019-01-01 RX ORDER — ALBUTEROL SULFATE 90 UG/1
2 AEROSOL, METERED RESPIRATORY (INHALATION) 3 TIMES DAILY PRN
Status: DISCONTINUED | OUTPATIENT
Start: 2019-01-01 | End: 2019-01-01 | Stop reason: HOSPADM

## 2019-01-01 RX ORDER — LACTULOSE 10 G/15ML
20 SOLUTION ORAL 3 TIMES DAILY
Status: DISCONTINUED | OUTPATIENT
Start: 2019-01-01 | End: 2019-01-01 | Stop reason: HOSPADM

## 2019-01-01 RX ORDER — HEPARIN SODIUM 1000 [USP'U]/ML
60 INJECTION, SOLUTION INTRAVENOUS; SUBCUTANEOUS PRN
Status: DISCONTINUED | OUTPATIENT
Start: 2019-01-01 | End: 2019-01-01 | Stop reason: HOSPADM

## 2019-01-01 RX ORDER — ACETAMINOPHEN 325 MG/1
650 TABLET ORAL EVERY 4 HOURS PRN
Status: DISCONTINUED | OUTPATIENT
Start: 2019-01-01 | End: 2019-01-01 | Stop reason: HOSPADM

## 2019-01-01 RX ORDER — POTASSIUM CHLORIDE 750 MG/1
10 TABLET, EXTENDED RELEASE ORAL 2 TIMES DAILY
Status: DISCONTINUED | OUTPATIENT
Start: 2019-01-01 | End: 2019-01-01 | Stop reason: HOSPADM

## 2019-01-01 RX ORDER — LANOLIN ALCOHOL/MO/W.PET/CERES
1000 CREAM (GRAM) TOPICAL DAILY
Status: DISCONTINUED | OUTPATIENT
Start: 2019-01-01 | End: 2019-01-01 | Stop reason: HOSPADM

## 2019-01-01 RX ORDER — SODIUM CHLORIDE 0.9 % (FLUSH) 0.9 %
10 SYRINGE (ML) INJECTION PRN
Status: DISCONTINUED | OUTPATIENT
Start: 2019-01-01 | End: 2019-01-01 | Stop reason: HOSPADM

## 2019-01-01 RX ORDER — POTASSIUM CHLORIDE 20 MEQ/1
40 TABLET, EXTENDED RELEASE ORAL ONCE
Status: COMPLETED | OUTPATIENT
Start: 2019-01-01 | End: 2019-01-01

## 2019-01-01 RX ORDER — ONDANSETRON 2 MG/ML
4 INJECTION INTRAMUSCULAR; INTRAVENOUS EVERY 6 HOURS PRN
Status: DISCONTINUED | OUTPATIENT
Start: 2019-01-01 | End: 2019-01-01

## 2019-01-01 RX ORDER — CLONIDINE 0.2 MG/24H
1 PATCH, EXTENDED RELEASE TRANSDERMAL WEEKLY
Status: ACTIVE | OUTPATIENT
Start: 2019-01-01 | End: 2019-01-01

## 2019-01-01 RX ORDER — CLONIDINE HYDROCHLORIDE 0.1 MG/1
0.1 TABLET ORAL 2 TIMES DAILY
Status: DISCONTINUED | OUTPATIENT
Start: 2019-01-01 | End: 2019-01-01

## 2019-01-01 RX ORDER — ATROPA BELLADONNA AND OPIUM 16.2; 6 MG/1; MG/1
30 SUPPOSITORY RECTAL EVERY 8 HOURS PRN
Status: DISCONTINUED | OUTPATIENT
Start: 2019-01-01 | End: 2019-01-01 | Stop reason: HOSPADM

## 2019-01-01 RX ORDER — SPIRONOLACTONE 25 MG/1
25 TABLET ORAL DAILY
Status: DISCONTINUED | OUTPATIENT
Start: 2019-01-01 | End: 2019-01-01 | Stop reason: HOSPADM

## 2019-01-01 RX ORDER — IPRATROPIUM BROMIDE AND ALBUTEROL SULFATE 2.5; .5 MG/3ML; MG/3ML
1 SOLUTION RESPIRATORY (INHALATION)
Status: DISCONTINUED | OUTPATIENT
Start: 2019-01-01 | End: 2019-01-01

## 2019-01-01 RX ORDER — ALBUTEROL SULFATE 90 UG/1
2 AEROSOL, METERED RESPIRATORY (INHALATION) EVERY 6 HOURS PRN
Status: DISCONTINUED | OUTPATIENT
Start: 2019-01-01 | End: 2019-01-01 | Stop reason: HOSPADM

## 2019-01-01 RX ORDER — FUROSEMIDE 10 MG/ML
40 INJECTION INTRAMUSCULAR; INTRAVENOUS DAILY
Status: DISCONTINUED | OUTPATIENT
Start: 2019-01-01 | End: 2019-01-01

## 2019-01-01 RX ORDER — SODIUM CHLORIDE 9 MG/ML
INJECTION, SOLUTION INTRAVENOUS CONTINUOUS
Status: DISCONTINUED | OUTPATIENT
Start: 2019-01-01 | End: 2019-01-01

## 2019-01-01 RX ORDER — HYDRALAZINE HYDROCHLORIDE 10 MG/1
35 TABLET, FILM COATED ORAL 3 TIMES DAILY
Qty: 90 TABLET | Refills: 3 | Status: SHIPPED | OUTPATIENT
Start: 2019-01-01

## 2019-01-01 RX ORDER — HEPARIN SODIUM 10000 [USP'U]/100ML
12 INJECTION, SOLUTION INTRAVENOUS CONTINUOUS
Status: DISCONTINUED | OUTPATIENT
Start: 2019-01-01 | End: 2019-01-01

## 2019-01-01 RX ORDER — BUMETANIDE 1 MG/1
1 TABLET ORAL DAILY
Status: DISCONTINUED | OUTPATIENT
Start: 2019-01-01 | End: 2019-01-01 | Stop reason: HOSPADM

## 2019-01-01 RX ORDER — BUMETANIDE 1 MG/1
1 TABLET ORAL DAILY
Qty: 30 TABLET | Refills: 3 | Status: SHIPPED | OUTPATIENT
Start: 2019-01-01 | End: 2020-01-01 | Stop reason: ALTCHOICE

## 2019-01-01 RX ORDER — SPIRONOLACTONE 25 MG/1
25 TABLET ORAL DAILY
Qty: 30 TABLET | Refills: 3 | Status: SHIPPED | OUTPATIENT
Start: 2019-01-01 | End: 2020-01-01 | Stop reason: ALTCHOICE

## 2019-01-01 RX ORDER — FUROSEMIDE 10 MG/ML
40 INJECTION INTRAMUSCULAR; INTRAVENOUS ONCE
Status: COMPLETED | OUTPATIENT
Start: 2019-01-01 | End: 2019-01-01

## 2019-01-01 RX ORDER — VITS A,C,E/LUTEIN/MINERALS 300MCG-200
1 TABLET ORAL DAILY
Status: DISCONTINUED | OUTPATIENT
Start: 2019-01-01 | End: 2019-01-01 | Stop reason: HOSPADM

## 2019-01-01 RX ORDER — ALBUTEROL SULFATE 2.5 MG/3ML
2.5 SOLUTION RESPIRATORY (INHALATION) ONCE
Status: COMPLETED | OUTPATIENT
Start: 2019-01-01 | End: 2019-01-01

## 2019-01-01 RX ORDER — POTASSIUM CHLORIDE 20 MEQ/1
40 TABLET, EXTENDED RELEASE ORAL ONCE
Status: DISCONTINUED | OUTPATIENT
Start: 2019-01-01 | End: 2019-01-01 | Stop reason: HOSPADM

## 2019-01-01 RX ORDER — CLONIDINE HYDROCHLORIDE 0.1 MG/1
0.1 TABLET ORAL 2 TIMES DAILY
Status: DISCONTINUED | OUTPATIENT
Start: 2019-01-01 | End: 2019-01-01 | Stop reason: HOSPADM

## 2019-01-01 RX ORDER — HEPARIN SODIUM 1000 [USP'U]/ML
60 INJECTION, SOLUTION INTRAVENOUS; SUBCUTANEOUS ONCE
Status: COMPLETED | OUTPATIENT
Start: 2019-01-01 | End: 2019-01-01

## 2019-01-01 RX ORDER — ONDANSETRON 2 MG/ML
4 INJECTION INTRAMUSCULAR; INTRAVENOUS EVERY 6 HOURS PRN
Status: DISCONTINUED | OUTPATIENT
Start: 2019-01-01 | End: 2019-01-01 | Stop reason: HOSPADM

## 2019-01-01 RX ADMIN — LORAZEPAM 1 MG: 1 TABLET ORAL at 09:06

## 2019-01-01 RX ADMIN — POTASSIUM CHLORIDE 10 MEQ: 10 TABLET, EXTENDED RELEASE ORAL at 09:28

## 2019-01-01 RX ADMIN — CLONIDINE HYDROCHLORIDE 0.1 MG: 0.1 TABLET ORAL at 11:52

## 2019-01-01 RX ADMIN — MIRTAZAPINE 15 MG: 15 TABLET, FILM COATED ORAL at 20:47

## 2019-01-01 RX ADMIN — CYCLOPENTOLATE HYDROCHLORIDE 1 TABLET: 10 SOLUTION/ DROPS OPHTHALMIC at 09:01

## 2019-01-01 RX ADMIN — DOCUSATE SODIUM 100 MG: 100 CAPSULE, LIQUID FILLED ORAL at 14:56

## 2019-01-01 RX ADMIN — POTASSIUM CHLORIDE 40 MEQ: 20 TABLET, EXTENDED RELEASE ORAL at 11:51

## 2019-01-01 RX ADMIN — IPRATROPIUM BROMIDE AND ALBUTEROL SULFATE 1 AMPULE: .5; 3 SOLUTION RESPIRATORY (INHALATION) at 17:18

## 2019-01-01 RX ADMIN — METOPROLOL TARTRATE 50 MG: 50 TABLET ORAL at 22:31

## 2019-01-01 RX ADMIN — MIRTAZAPINE 15 MG: 15 TABLET, FILM COATED ORAL at 20:25

## 2019-01-01 RX ADMIN — FUROSEMIDE 20 MG: 20 TABLET ORAL at 09:57

## 2019-01-01 RX ADMIN — HYDRALAZINE HYDROCHLORIDE 35 MG: 25 TABLET, FILM COATED ORAL at 13:52

## 2019-01-01 RX ADMIN — Medication 10 ML: at 14:02

## 2019-01-01 RX ADMIN — DOXYCYCLINE 100 MG: 100 INJECTION, POWDER, LYOPHILIZED, FOR SOLUTION INTRAVENOUS at 03:05

## 2019-01-01 RX ADMIN — Medication 1 CAPSULE: at 09:57

## 2019-01-01 RX ADMIN — FUROSEMIDE 40 MG: 10 INJECTION, SOLUTION INTRAMUSCULAR; INTRAVENOUS at 15:13

## 2019-01-01 RX ADMIN — LEVOTHYROXINE SODIUM 112 MCG: 112 TABLET ORAL at 05:49

## 2019-01-01 RX ADMIN — HYDRALAZINE HYDROCHLORIDE 25 MG: 25 TABLET, FILM COATED ORAL at 09:57

## 2019-01-01 RX ADMIN — MIRTAZAPINE 15 MG: 15 TABLET, FILM COATED ORAL at 21:33

## 2019-01-01 RX ADMIN — CYCLOPENTOLATE HYDROCHLORIDE 1 TABLET: 10 SOLUTION/ DROPS OPHTHALMIC at 08:36

## 2019-01-01 RX ADMIN — METOPROLOL TARTRATE 25 MG: 25 TABLET ORAL at 21:33

## 2019-01-01 RX ADMIN — ENALAPRIL MALEATE 10 MG: 10 TABLET ORAL at 08:44

## 2019-01-01 RX ADMIN — LORAZEPAM 1 MG: 1 TABLET ORAL at 11:59

## 2019-01-01 RX ADMIN — SPIRONOLACTONE 25 MG: 25 TABLET ORAL at 09:15

## 2019-01-01 RX ADMIN — HYDRALAZINE HYDROCHLORIDE 25 MG: 25 TABLET, FILM COATED ORAL at 09:26

## 2019-01-01 RX ADMIN — Medication 1 CAPSULE: at 09:02

## 2019-01-01 RX ADMIN — MIRTAZAPINE 15 MG: 15 TABLET, FILM COATED ORAL at 20:12

## 2019-01-01 RX ADMIN — CLONIDINE HYDROCHLORIDE 0.1 MG: 0.1 TABLET ORAL at 22:28

## 2019-01-01 RX ADMIN — Medication 1 CAPSULE: at 09:22

## 2019-01-01 RX ADMIN — HYDRALAZINE HYDROCHLORIDE 35 MG: 25 TABLET, FILM COATED ORAL at 21:33

## 2019-01-01 RX ADMIN — APIXABAN 2.5 MG: 2.5 TABLET, FILM COATED ORAL at 20:44

## 2019-01-01 RX ADMIN — MIRTAZAPINE 15 MG: 15 TABLET, FILM COATED ORAL at 20:42

## 2019-01-01 RX ADMIN — LORAZEPAM 1 MG: 1 TABLET ORAL at 20:44

## 2019-01-01 RX ADMIN — Medication 10 ML: at 08:52

## 2019-01-01 RX ADMIN — AMLODIPINE BESYLATE 5 MG: 5 TABLET ORAL at 09:02

## 2019-01-01 RX ADMIN — FUROSEMIDE 20 MG: 20 TABLET ORAL at 08:35

## 2019-01-01 RX ADMIN — POTASSIUM CHLORIDE 10 MEQ: 10 TABLET, EXTENDED RELEASE ORAL at 09:23

## 2019-01-01 RX ADMIN — ENOXAPARIN SODIUM 40 MG: 40 INJECTION SUBCUTANEOUS at 09:58

## 2019-01-01 RX ADMIN — POTASSIUM CHLORIDE 10 MEQ: 10 TABLET, EXTENDED RELEASE ORAL at 20:42

## 2019-01-01 RX ADMIN — CEFTRIAXONE SODIUM 1 G: 1 INJECTION, POWDER, FOR SOLUTION INTRAMUSCULAR; INTRAVENOUS at 00:15

## 2019-01-01 RX ADMIN — Medication 10 ML: at 11:54

## 2019-01-01 RX ADMIN — APIXABAN 5 MG: 5 TABLET, FILM COATED ORAL at 09:09

## 2019-01-01 RX ADMIN — APIXABAN 5 MG: 5 TABLET, FILM COATED ORAL at 20:34

## 2019-01-01 RX ADMIN — HYDRALAZINE HYDROCHLORIDE 25 MG: 25 TABLET, FILM COATED ORAL at 17:43

## 2019-01-01 RX ADMIN — Medication 10 ML: at 09:16

## 2019-01-01 RX ADMIN — LACTULOSE 20 G: 20 SOLUTION ORAL at 09:15

## 2019-01-01 RX ADMIN — APIXABAN 5 MG: 5 TABLET, FILM COATED ORAL at 08:52

## 2019-01-01 RX ADMIN — MIRTAZAPINE 15 MG: 15 TABLET, FILM COATED ORAL at 20:45

## 2019-01-01 RX ADMIN — FUROSEMIDE 20 MG: 20 TABLET ORAL at 09:22

## 2019-01-01 RX ADMIN — Medication 1 CAPSULE: at 08:36

## 2019-01-01 RX ADMIN — SPIRONOLACTONE 25 MG: 25 TABLET ORAL at 11:32

## 2019-01-01 RX ADMIN — POTASSIUM CHLORIDE 10 MEQ: 10 TABLET, EXTENDED RELEASE ORAL at 08:35

## 2019-01-01 RX ADMIN — SODIUM CHLORIDE: 9 INJECTION, SOLUTION INTRAVENOUS at 13:46

## 2019-01-01 RX ADMIN — METOPROLOL TARTRATE 50 MG: 50 TABLET ORAL at 20:59

## 2019-01-01 RX ADMIN — IPRATROPIUM BROMIDE AND ALBUTEROL SULFATE 1 AMPULE: .5; 3 SOLUTION RESPIRATORY (INHALATION) at 15:42

## 2019-01-01 RX ADMIN — AMLODIPINE BESYLATE 5 MG: 5 TABLET ORAL at 08:35

## 2019-01-01 RX ADMIN — IPRATROPIUM BROMIDE AND ALBUTEROL SULFATE 1 AMPULE: .5; 3 SOLUTION RESPIRATORY (INHALATION) at 19:35

## 2019-01-01 RX ADMIN — HYDRALAZINE HYDROCHLORIDE 25 MG: 25 TABLET, FILM COATED ORAL at 20:42

## 2019-01-01 RX ADMIN — ENALAPRIL MALEATE 10 MG: 10 TABLET ORAL at 09:22

## 2019-01-01 RX ADMIN — METOPROLOL TARTRATE 25 MG: 25 TABLET ORAL at 09:35

## 2019-01-01 RX ADMIN — POTASSIUM CHLORIDE 10 MEQ: 10 TABLET, EXTENDED RELEASE ORAL at 09:35

## 2019-01-01 RX ADMIN — HYDRALAZINE HYDROCHLORIDE 25 MG: 25 TABLET, FILM COATED ORAL at 20:34

## 2019-01-01 RX ADMIN — METOPROLOL TARTRATE 25 MG: 25 TABLET ORAL at 20:42

## 2019-01-01 RX ADMIN — CYCLOPENTOLATE HYDROCHLORIDE 1 TABLET: 10 SOLUTION/ DROPS OPHTHALMIC at 09:23

## 2019-01-01 RX ADMIN — METOPROLOL TARTRATE 25 MG: 25 TABLET ORAL at 21:32

## 2019-01-01 RX ADMIN — MIRTAZAPINE 15 MG: 15 TABLET, FILM COATED ORAL at 21:31

## 2019-01-01 RX ADMIN — DOXYCYCLINE 100 MG: 100 INJECTION, POWDER, LYOPHILIZED, FOR SOLUTION INTRAVENOUS at 15:09

## 2019-01-01 RX ADMIN — FUROSEMIDE 40 MG: 10 INJECTION, SOLUTION INTRAMUSCULAR; INTRAVENOUS at 13:14

## 2019-01-01 RX ADMIN — CEFTRIAXONE SODIUM 1 G: 1 INJECTION, POWDER, FOR SOLUTION INTRAMUSCULAR; INTRAVENOUS at 14:08

## 2019-01-01 RX ADMIN — SODIUM CHLORIDE: 9 INJECTION, SOLUTION INTRAVENOUS at 20:01

## 2019-01-01 RX ADMIN — LEVOTHYROXINE SODIUM 112 MCG: 112 TABLET ORAL at 08:36

## 2019-01-01 RX ADMIN — APIXABAN 2.5 MG: 5 TABLET, FILM COATED ORAL at 11:51

## 2019-01-01 RX ADMIN — CEFTRIAXONE SODIUM 1 G: 1 INJECTION, POWDER, FOR SOLUTION INTRAMUSCULAR; INTRAVENOUS at 11:32

## 2019-01-01 RX ADMIN — MIRTAZAPINE 15 MG: 15 TABLET, FILM COATED ORAL at 21:07

## 2019-01-01 RX ADMIN — METOPROLOL TARTRATE 25 MG: 25 TABLET ORAL at 11:51

## 2019-01-01 RX ADMIN — LEVOTHYROXINE SODIUM 112 MCG: 112 TABLET ORAL at 06:01

## 2019-01-01 RX ADMIN — POTASSIUM CHLORIDE 10 MEQ: 10 TABLET, EXTENDED RELEASE ORAL at 21:33

## 2019-01-01 RX ADMIN — IPRATROPIUM BROMIDE AND ALBUTEROL SULFATE 1 AMPULE: .5; 3 SOLUTION RESPIRATORY (INHALATION) at 10:13

## 2019-01-01 RX ADMIN — CLONIDINE HYDROCHLORIDE 0.1 MG: 0.1 TABLET ORAL at 09:09

## 2019-01-01 RX ADMIN — DOXYCYCLINE 100 MG: 100 INJECTION, POWDER, LYOPHILIZED, FOR SOLUTION INTRAVENOUS at 14:00

## 2019-01-01 RX ADMIN — CYCLOPENTOLATE HYDROCHLORIDE 1 TABLET: 10 SOLUTION/ DROPS OPHTHALMIC at 09:36

## 2019-01-01 RX ADMIN — SODIUM CHLORIDE: 9 INJECTION, SOLUTION INTRAVENOUS at 09:01

## 2019-01-01 RX ADMIN — METOPROLOL TARTRATE 25 MG: 25 TABLET ORAL at 20:47

## 2019-01-01 RX ADMIN — POTASSIUM CHLORIDE 10 MEQ: 10 TABLET, EXTENDED RELEASE ORAL at 09:02

## 2019-01-01 RX ADMIN — METOPROLOL TARTRATE 25 MG: 25 TABLET ORAL at 08:43

## 2019-01-01 RX ADMIN — APIXABAN 5 MG: 5 TABLET, FILM COATED ORAL at 20:12

## 2019-01-01 RX ADMIN — APIXABAN 5 MG: 5 TABLET, FILM COATED ORAL at 22:28

## 2019-01-01 RX ADMIN — METOPROLOL TARTRATE 50 MG: 50 TABLET ORAL at 20:34

## 2019-01-01 RX ADMIN — APIXABAN 5 MG: 5 TABLET, FILM COATED ORAL at 09:15

## 2019-01-01 RX ADMIN — AMLODIPINE BESYLATE 5 MG: 5 TABLET ORAL at 09:35

## 2019-01-01 RX ADMIN — Medication 10 ML: at 20:13

## 2019-01-01 RX ADMIN — HYDRALAZINE HYDROCHLORIDE 35 MG: 25 TABLET, FILM COATED ORAL at 09:35

## 2019-01-01 RX ADMIN — PROPOFOL 80 MG: 10 INJECTION, EMULSION INTRAVENOUS at 14:41

## 2019-01-01 RX ADMIN — IPRATROPIUM BROMIDE AND ALBUTEROL SULFATE 1 AMPULE: .5; 3 SOLUTION RESPIRATORY (INHALATION) at 10:08

## 2019-01-01 RX ADMIN — METOPROLOL TARTRATE 25 MG: 25 TABLET ORAL at 08:35

## 2019-01-01 RX ADMIN — REGADENOSON 0.4 MG: 0.08 INJECTION, SOLUTION INTRAVENOUS at 11:18

## 2019-01-01 RX ADMIN — MIRTAZAPINE 15 MG: 15 TABLET, FILM COATED ORAL at 22:28

## 2019-01-01 RX ADMIN — Medication 10 ML: at 23:21

## 2019-01-01 RX ADMIN — POTASSIUM CHLORIDE 10 MEQ: 10 TABLET, EXTENDED RELEASE ORAL at 20:44

## 2019-01-01 RX ADMIN — Medication 1000 MCG: at 09:15

## 2019-01-01 RX ADMIN — LORAZEPAM 1 MG: 1 TABLET ORAL at 09:35

## 2019-01-01 RX ADMIN — HYDRALAZINE HYDROCHLORIDE 35 MG: 25 TABLET, FILM COATED ORAL at 13:53

## 2019-01-01 RX ADMIN — METOPROLOL TARTRATE 25 MG: 25 TABLET ORAL at 09:26

## 2019-01-01 RX ADMIN — METOPROLOL TARTRATE 50 MG: 50 TABLET ORAL at 09:15

## 2019-01-01 RX ADMIN — Medication 10 ML: at 08:49

## 2019-01-01 RX ADMIN — IPRATROPIUM BROMIDE AND ALBUTEROL SULFATE 1 AMPULE: .5; 3 SOLUTION RESPIRATORY (INHALATION) at 13:47

## 2019-01-01 RX ADMIN — IPRATROPIUM BROMIDE AND ALBUTEROL SULFATE 1 AMPULE: .5; 3 SOLUTION RESPIRATORY (INHALATION) at 17:47

## 2019-01-01 RX ADMIN — METOPROLOL TARTRATE 50 MG: 50 TABLET ORAL at 08:52

## 2019-01-01 RX ADMIN — LORAZEPAM 1 MG: 1 TABLET ORAL at 20:50

## 2019-01-01 RX ADMIN — HYDRALAZINE HYDROCHLORIDE 25 MG: 25 TABLET, FILM COATED ORAL at 15:39

## 2019-01-01 RX ADMIN — LEVOTHYROXINE SODIUM 112 MCG: 112 TABLET ORAL at 06:36

## 2019-01-01 RX ADMIN — POTASSIUM CHLORIDE 10 MEQ: 10 TABLET, EXTENDED RELEASE ORAL at 21:34

## 2019-01-01 RX ADMIN — LEVOTHYROXINE SODIUM 112 MCG: 112 TABLET ORAL at 09:23

## 2019-01-01 RX ADMIN — Medication 10 MILLICURIE: at 09:17

## 2019-01-01 RX ADMIN — Medication 10 ML: at 09:36

## 2019-01-01 RX ADMIN — CEFTRIAXONE SODIUM 1 G: 1 INJECTION, POWDER, FOR SOLUTION INTRAMUSCULAR; INTRAVENOUS at 12:27

## 2019-01-01 RX ADMIN — TIOTROPIUM BROMIDE 18 MCG: 18 CAPSULE ORAL; RESPIRATORY (INHALATION) at 08:52

## 2019-01-01 RX ADMIN — METOPROLOL TARTRATE 25 MG: 25 TABLET ORAL at 09:57

## 2019-01-01 RX ADMIN — ACETAMINOPHEN 650 MG: 325 TABLET, FILM COATED ORAL at 20:44

## 2019-01-01 RX ADMIN — CEFTRIAXONE SODIUM 1 G: 1 INJECTION, POWDER, FOR SOLUTION INTRAMUSCULAR; INTRAVENOUS at 21:14

## 2019-01-01 RX ADMIN — LEVOTHYROXINE SODIUM 112 MCG: 112 TABLET ORAL at 09:57

## 2019-01-01 RX ADMIN — APIXABAN 2.5 MG: 2.5 TABLET, FILM COATED ORAL at 09:02

## 2019-01-01 RX ADMIN — IPRATROPIUM BROMIDE AND ALBUTEROL SULFATE 1 AMPULE: .5; 3 SOLUTION RESPIRATORY (INHALATION) at 22:37

## 2019-01-01 RX ADMIN — DOCUSATE SODIUM 100 MG: 100 CAPSULE, LIQUID FILLED ORAL at 08:43

## 2019-01-01 RX ADMIN — CEFTRIAXONE SODIUM 1 G: 1 INJECTION, POWDER, FOR SOLUTION INTRAMUSCULAR; INTRAVENOUS at 00:09

## 2019-01-01 RX ADMIN — FUROSEMIDE 20 MG: 20 TABLET ORAL at 08:42

## 2019-01-01 RX ADMIN — METOPROLOL TARTRATE 50 MG: 50 TABLET ORAL at 09:09

## 2019-01-01 RX ADMIN — CEFTRIAXONE SODIUM 1 G: 1 INJECTION, POWDER, FOR SOLUTION INTRAMUSCULAR; INTRAVENOUS at 15:06

## 2019-01-01 RX ADMIN — FUROSEMIDE 40 MG: 10 INJECTION, SOLUTION INTRAMUSCULAR; INTRAVENOUS at 08:47

## 2019-01-01 RX ADMIN — IPRATROPIUM BROMIDE AND ALBUTEROL SULFATE 1 AMPULE: .5; 3 SOLUTION RESPIRATORY (INHALATION) at 21:06

## 2019-01-01 RX ADMIN — CYCLOPENTOLATE HYDROCHLORIDE 1 TABLET: 10 SOLUTION/ DROPS OPHTHALMIC at 08:43

## 2019-01-01 RX ADMIN — POTASSIUM CHLORIDE 10 MEQ: 10 TABLET, EXTENDED RELEASE ORAL at 21:07

## 2019-01-01 RX ADMIN — LEVOTHYROXINE SODIUM 112 MCG: 112 TABLET ORAL at 09:35

## 2019-01-01 RX ADMIN — CLONIDINE HYDROCHLORIDE 0.1 MG: 0.1 TABLET ORAL at 08:52

## 2019-01-01 RX ADMIN — POTASSIUM CHLORIDE 10 MEQ: 10 TABLET, EXTENDED RELEASE ORAL at 20:25

## 2019-01-01 RX ADMIN — TIOTROPIUM BROMIDE 18 MCG: 18 CAPSULE ORAL; RESPIRATORY (INHALATION) at 09:16

## 2019-01-01 RX ADMIN — Medication 10 ML: at 20:45

## 2019-01-01 RX ADMIN — SPIRONOLACTONE 25 MG: 25 TABLET ORAL at 15:13

## 2019-01-01 RX ADMIN — Medication 10 ML: at 08:37

## 2019-01-01 RX ADMIN — DOXYCYCLINE 100 MG: 100 INJECTION, POWDER, LYOPHILIZED, FOR SOLUTION INTRAVENOUS at 02:47

## 2019-01-01 RX ADMIN — Medication 10 ML: at 09:11

## 2019-01-01 RX ADMIN — HEPARIN SODIUM 10 UNITS/KG/HR: 10000 INJECTION, SOLUTION INTRAVENOUS at 09:19

## 2019-01-01 RX ADMIN — ACETAMINOPHEN 650 MG: 325 TABLET, FILM COATED ORAL at 17:33

## 2019-01-01 RX ADMIN — APIXABAN 2.5 MG: 5 TABLET, FILM COATED ORAL at 20:59

## 2019-01-01 RX ADMIN — Medication 10 ML: at 22:29

## 2019-01-01 RX ADMIN — LEVOTHYROXINE SODIUM 112 MCG: 112 TABLET ORAL at 09:26

## 2019-01-01 RX ADMIN — HYDRALAZINE HYDROCHLORIDE 35 MG: 25 TABLET, FILM COATED ORAL at 20:25

## 2019-01-01 RX ADMIN — LACTULOSE 20 G: 20 SOLUTION ORAL at 14:09

## 2019-01-01 RX ADMIN — APIXABAN 2.5 MG: 2.5 TABLET, FILM COATED ORAL at 20:25

## 2019-01-01 RX ADMIN — HYDRALAZINE HYDROCHLORIDE 35 MG: 25 TABLET, FILM COATED ORAL at 20:44

## 2019-01-01 RX ADMIN — Medication 1 CAPSULE: at 08:43

## 2019-01-01 RX ADMIN — ENALAPRIL MALEATE 10 MG: 10 TABLET ORAL at 08:35

## 2019-01-01 RX ADMIN — POTASSIUM CHLORIDE 10 MEQ: 10 TABLET, EXTENDED RELEASE ORAL at 09:57

## 2019-01-01 RX ADMIN — Medication 10 ML: at 12:56

## 2019-01-01 RX ADMIN — LEVOTHYROXINE SODIUM 112 MCG: 112 TABLET ORAL at 08:42

## 2019-01-01 RX ADMIN — MIRTAZAPINE 15 MG: 15 TABLET, FILM COATED ORAL at 20:34

## 2019-01-01 RX ADMIN — BUMETANIDE 1 MG: 1 TABLET ORAL at 09:15

## 2019-01-01 RX ADMIN — METOPROLOL TARTRATE 25 MG: 25 TABLET ORAL at 20:44

## 2019-01-01 RX ADMIN — Medication 1000 MCG: at 08:52

## 2019-01-01 RX ADMIN — Medication 1 CAPSULE: at 09:36

## 2019-01-01 RX ADMIN — HYDRALAZINE HYDROCHLORIDE 35 MG: 25 TABLET, FILM COATED ORAL at 09:02

## 2019-01-01 RX ADMIN — ENALAPRIL MALEATE 10 MG: 10 TABLET ORAL at 09:56

## 2019-01-01 RX ADMIN — HYDRALAZINE HYDROCHLORIDE 25 MG: 25 TABLET, FILM COATED ORAL at 16:17

## 2019-01-01 RX ADMIN — DOXYCYCLINE 100 MG: 100 INJECTION, POWDER, LYOPHILIZED, FOR SOLUTION INTRAVENOUS at 23:18

## 2019-01-01 RX ADMIN — METOPROLOL TARTRATE 25 MG: 25 TABLET ORAL at 21:07

## 2019-01-01 RX ADMIN — METOPROLOL TARTRATE 25 MG: 25 TABLET ORAL at 09:23

## 2019-01-01 RX ADMIN — HYDRALAZINE HYDROCHLORIDE 25 MG: 25 TABLET, FILM COATED ORAL at 08:35

## 2019-01-01 RX ADMIN — Medication 35 MILLICURIE: at 11:27

## 2019-01-01 RX ADMIN — METOPROLOL TARTRATE 50 MG: 50 TABLET ORAL at 08:49

## 2019-01-01 RX ADMIN — CYCLOPENTOLATE HYDROCHLORIDE 1 TABLET: 10 SOLUTION/ DROPS OPHTHALMIC at 09:28

## 2019-01-01 RX ADMIN — SPIRONOLACTONE 25 MG: 25 TABLET ORAL at 08:47

## 2019-01-01 RX ADMIN — HYDRALAZINE HYDROCHLORIDE 25 MG: 25 TABLET, FILM COATED ORAL at 09:15

## 2019-01-01 RX ADMIN — AMLODIPINE BESYLATE 5 MG: 5 TABLET ORAL at 09:56

## 2019-01-01 RX ADMIN — POTASSIUM CHLORIDE 10 MEQ: 10 TABLET, EXTENDED RELEASE ORAL at 08:42

## 2019-01-01 RX ADMIN — CEFTRIAXONE SODIUM 1 G: 1 INJECTION, POWDER, FOR SOLUTION INTRAMUSCULAR; INTRAVENOUS at 23:59

## 2019-01-01 RX ADMIN — IPRATROPIUM BROMIDE AND ALBUTEROL SULFATE 1 AMPULE: .5; 3 SOLUTION RESPIRATORY (INHALATION) at 12:28

## 2019-01-01 RX ADMIN — DOCUSATE SODIUM 100 MG: 100 CAPSULE, LIQUID FILLED ORAL at 09:35

## 2019-01-01 RX ADMIN — LEVOTHYROXINE SODIUM 112 MCG: 112 TABLET ORAL at 09:02

## 2019-01-01 RX ADMIN — BUMETANIDE 1 MG: 1 TABLET ORAL at 08:52

## 2019-01-01 RX ADMIN — Medication 10 ML: at 20:42

## 2019-01-01 RX ADMIN — METOPROLOL TARTRATE 50 MG: 50 TABLET ORAL at 20:12

## 2019-01-01 RX ADMIN — Medication 1 CAPSULE: at 09:28

## 2019-01-01 RX ADMIN — IPRATROPIUM BROMIDE AND ALBUTEROL SULFATE 1 AMPULE: .5; 3 SOLUTION RESPIRATORY (INHALATION) at 14:02

## 2019-01-01 RX ADMIN — AMLODIPINE BESYLATE 5 MG: 5 TABLET ORAL at 11:51

## 2019-01-01 RX ADMIN — HEPARIN SODIUM 12 UNITS/KG/HR: 10000 INJECTION, SOLUTION INTRAVENOUS at 20:05

## 2019-01-01 RX ADMIN — SPIRONOLACTONE 25 MG: 25 TABLET ORAL at 08:52

## 2019-01-01 RX ADMIN — ENOXAPARIN SODIUM 40 MG: 40 INJECTION SUBCUTANEOUS at 14:15

## 2019-01-01 RX ADMIN — Medication 10 ML: at 20:12

## 2019-01-01 RX ADMIN — HYDRALAZINE HYDROCHLORIDE 25 MG: 25 TABLET, FILM COATED ORAL at 21:31

## 2019-01-01 RX ADMIN — Medication 10 ML: at 20:27

## 2019-01-01 RX ADMIN — MAGNESIUM HYDROXIDE 30 ML: 400 SUSPENSION ORAL at 20:34

## 2019-01-01 RX ADMIN — POLYVINYL ALCOHOL 1 DROP: 14 SOLUTION/ DROPS OPHTHALMIC at 09:24

## 2019-01-01 RX ADMIN — HEPARIN SODIUM 3970 UNITS: 1000 INJECTION, SOLUTION INTRAVENOUS; SUBCUTANEOUS at 20:03

## 2019-01-01 RX ADMIN — IPRATROPIUM BROMIDE AND ALBUTEROL SULFATE 1 AMPULE: .5; 3 SOLUTION RESPIRATORY (INHALATION) at 15:24

## 2019-01-01 RX ADMIN — LORAZEPAM 1 MG: 1 TABLET ORAL at 08:42

## 2019-01-01 RX ADMIN — POTASSIUM CHLORIDE 10 MEQ: 10 TABLET, EXTENDED RELEASE ORAL at 20:47

## 2019-01-01 RX ADMIN — LORAZEPAM 1 MG: 1 TABLET ORAL at 20:54

## 2019-01-01 RX ADMIN — METOPROLOL TARTRATE 25 MG: 25 TABLET ORAL at 20:25

## 2019-01-01 RX ADMIN — HYDRALAZINE HYDROCHLORIDE 25 MG: 25 TABLET, FILM COATED ORAL at 21:07

## 2019-01-01 RX ADMIN — CLONIDINE HYDROCHLORIDE 0.1 MG: 0.1 TABLET ORAL at 08:47

## 2019-01-01 RX ADMIN — METOPROLOL TARTRATE 25 MG: 25 TABLET ORAL at 09:02

## 2019-01-01 RX ADMIN — APIXABAN 5 MG: 5 TABLET, FILM COATED ORAL at 08:46

## 2019-01-01 RX ADMIN — Medication 10 ML: at 09:58

## 2019-01-01 RX ADMIN — DOCUSATE SODIUM 100 MG: 100 CAPSULE, LIQUID FILLED ORAL at 09:02

## 2019-01-01 RX ADMIN — AMLODIPINE BESYLATE 5 MG: 5 TABLET ORAL at 09:23

## 2019-01-01 RX ADMIN — FUROSEMIDE 20 MG: 20 TABLET ORAL at 09:26

## 2019-01-01 RX ADMIN — CLONIDINE HYDROCHLORIDE 0.1 MG: 0.1 TABLET ORAL at 20:59

## 2019-01-01 RX ADMIN — APIXABAN 2.5 MG: 2.5 TABLET, FILM COATED ORAL at 09:35

## 2019-01-01 RX ADMIN — FUROSEMIDE 40 MG: 10 INJECTION, SOLUTION INTRAMUSCULAR; INTRAVENOUS at 09:09

## 2019-01-01 RX ADMIN — IPRATROPIUM BROMIDE AND ALBUTEROL SULFATE 1 AMPULE: .5; 3 SOLUTION RESPIRATORY (INHALATION) at 10:00

## 2019-01-01 RX ADMIN — CYCLOPENTOLATE HYDROCHLORIDE 1 TABLET: 10 SOLUTION/ DROPS OPHTHALMIC at 09:57

## 2019-01-01 RX ADMIN — CLONIDINE HYDROCHLORIDE 0.1 MG: 0.1 TABLET ORAL at 20:12

## 2019-01-01 RX ADMIN — ALBUTEROL SULFATE 2.5 MG: 2.5 SOLUTION RESPIRATORY (INHALATION) at 22:28

## 2019-01-01 RX ADMIN — CEFTRIAXONE SODIUM 1 G: 1 INJECTION, POWDER, FOR SOLUTION INTRAMUSCULAR; INTRAVENOUS at 23:21

## 2019-01-01 RX ADMIN — Medication 10 ML: at 20:34

## 2019-01-01 ASSESSMENT — ENCOUNTER SYMPTOMS
SHORTNESS OF BREATH: 0
WHEEZING: 0
PHOTOPHOBIA: 0
PHOTOPHOBIA: 0
WHEEZING: 0
ABDOMINAL PAIN: 0
DIARRHEA: 0
CONSTIPATION: 0
RHINORRHEA: 0
PHOTOPHOBIA: 0
SHORTNESS OF BREATH: 0
SHORTNESS OF BREATH: 0
ABDOMINAL DISTENTION: 0
SHORTNESS OF BREATH: 0
VOMITING: 0
WHEEZING: 0
COUGH: 0
NAUSEA: 0
SHORTNESS OF BREATH: 0
VOMITING: 0
SORE THROAT: 0
COUGH: 0
CHEST TIGHTNESS: 0
SHORTNESS OF BREATH: 0
CHEST TIGHTNESS: 0
DIARRHEA: 0
VOMITING: 0
ABDOMINAL PAIN: 0
NAUSEA: 0
COUGH: 0
BACK PAIN: 0
VOICE CHANGE: 0
CHEST TIGHTNESS: 0
NAUSEA: 0
ABDOMINAL PAIN: 1
WHEEZING: 0
TROUBLE SWALLOWING: 0
BACK PAIN: 0
ABDOMINAL PAIN: 0
VOMITING: 0

## 2019-01-01 ASSESSMENT — PAIN SCALES - GENERAL
PAINLEVEL_OUTOF10: 0
PAINLEVEL_OUTOF10: 10
PAINLEVEL_OUTOF10: 0
PAINLEVEL_OUTOF10: 6
PAINLEVEL_OUTOF10: 0
PAINLEVEL_OUTOF10: 10
PAINLEVEL_OUTOF10: 0

## 2019-01-01 ASSESSMENT — PAIN DESCRIPTION - LOCATION
LOCATION: HAND;FOOT
LOCATION: SCROTUM;OTHER (COMMENT)
LOCATION: PENIS;SCROTUM

## 2019-01-01 ASSESSMENT — PAIN DESCRIPTION - PAIN TYPE
TYPE: ACUTE PAIN

## 2019-01-01 ASSESSMENT — PAIN DESCRIPTION - FREQUENCY
FREQUENCY: CONTINUOUS
FREQUENCY: CONTINUOUS

## 2019-01-01 ASSESSMENT — PAIN DESCRIPTION - PROGRESSION
CLINICAL_PROGRESSION: NOT CHANGED

## 2019-01-01 ASSESSMENT — PAIN DESCRIPTION - DESCRIPTORS
DESCRIPTORS: ACHING
DESCRIPTORS: ACHING
DESCRIPTORS: TINGLING

## 2019-01-01 ASSESSMENT — PAIN DESCRIPTION - ORIENTATION: ORIENTATION: RIGHT;LEFT

## 2019-01-01 ASSESSMENT — VISUAL ACUITY: VA_NORMAL: 1

## 2019-01-01 ASSESSMENT — PAIN DESCRIPTION - ONSET
ONSET: ON-GOING
ONSET: ON-GOING

## 2019-01-01 ASSESSMENT — PAIN - FUNCTIONAL ASSESSMENT
PAIN_FUNCTIONAL_ASSESSMENT: PREVENTS OR INTERFERES SOME ACTIVE ACTIVITIES AND ADLS
PAIN_FUNCTIONAL_ASSESSMENT: ACTIVITIES ARE NOT PREVENTED

## 2019-07-28 PROBLEM — I50.43 CHF (CONGESTIVE HEART FAILURE), NYHA CLASS I, ACUTE ON CHRONIC, COMBINED (HCC): Status: ACTIVE | Noted: 2019-01-01

## 2019-07-28 PROBLEM — I48.91 NEW ONSET ATRIAL FIBRILLATION (HCC): Status: ACTIVE | Noted: 2019-01-01

## 2019-07-28 NOTE — ED PROVIDER NOTES
remarkable for [place] 87[address], troponin 0.05, creatinine of 1.3. EKG showed atrial fibrillation which is new from previous ekg. Chest xray showed possible pleural effusion on the left and pulmonary edema. Lasix given. Sanchez catheter placed with good output of urine. Patient to be admitted for further work up of CHF and new onset atrial fibrillation. Spoke with Dr. Reyes Killian who agreed to admit patient. Admitted in stable condition. Amount and/or Complexity of Data Reviewed  Decide to obtain previous medical records or to obtain history from someone other than the patient: yes           --------------------------------------------- PAST HISTORY ---------------------------------------------  Past Medical History:  has a past medical history of Anemia, Aneurysm of aorta ([address] Bronchitis, CAD (coronary artery disease), Hypertension, and Thyroid disease. Past Surgical History:  has a past surgical history that includes Diagnostic Cardiac Cath Lab Procedure (08/2004); Hemorrhoid surgery; Cataract removal (08/2011,10/2011); and Colonoscopy (01/2016). Social History:  reports that he quit smoking about 45 years ago. His smoking use included cigarettes. He has a 5.00 pack-year smoking history. He has never used smokeless tobacco. He reports that he does not drink alcohol. Family History: Family history is unknown by patient. The patients home medications have been reviewed.     Allergies: Reglan [metoclopramide] and Other    -------------------------------------------------- RESULTS -------------------------------------------------    LABS:  Results for orders placed or performed during the hospital encounter of 07/28/19   Urinalysis   Result Value Ref Range    Color, UA Yellow Straw/Yellow    Clarity, UA Clear Clear    Glucose, Ur Negative Negative mg/dL    Bilirubin Urine Negative Negative    Ketones, Urine Negative Negative mg/dL    Specific Gravity, UA 1.010 1.005 - 1.030    Blood, Urine TRACE-INTACT Negative    pH, UA 6.0 5.0 - 9.0    Protein, UA 30 (A) Negative mg/dL    Urobilinogen, Urine 2.0 (A) <2.0 E.U./dL    Nitrite, Urine Negative Negative    Leukocyte Esterase, Urine Negative Negative   CBC Auto Differential   Result Value Ref Range    WBC 7.2 4.5 - 11.5 E9/L    RBC 3.84 3.80 - 5.80 E12/L    Hemoglobin 11.2 (L) 12.5 - 16.5 g/dL    Hematocrit 35.3 (L) 37.0 - 54.0 %    MCV 91.9 80.0 - 99.9 fL    MCH 29.2 26.0 - 35.0 pg    MCHC 31.7 (L) 32.0 - 34.5 %    RDW 16.6 (H) 11.5 - 15.0 fL    Platelets 180 627 - 538 E9/L    MPV 11.3 7.0 - 12.0 fL    Neutrophils % 86.5 (H) 43.0 - 80.0 %    Immature Granulocytes % 0.3 0.0 - 5.0 %    Lymphocytes % 4.4 (L) 20.0 - 42.0 %    Monocytes % 7.8 2.0 - 12.0 %    Eosinophils % 0.6 0.0 - 6.0 %    Basophils % 0.4 0.0 - 2.0 %    Neutrophils # 6.23 1.80 - 7.30 E9/L    Immature Granulocytes # 0.02 E9/L    Lymphocytes # 0.32 (L) 1.50 - 4.00 E9/L    Monocytes # 0.56 0.10 - 0.95 E9/L    Eosinophils # 0.04 (L) 0.05 - 0.50 E9/L    Basophils # 0.03 0.00 - 0.20 E9/L    Anisocytosis 1+     Poikilocytes 3+     Brandon Cells 3+    Basic Metabolic Panel   Result Value Ref Range    Sodium 139 132 - 146 mmol/L    Potassium 6.2 (H) 3.5 - 5.0 mmol/L    Chloride 103 98 - 107 mmol/L    CO2 23 22 - 29 mmol/L    Anion Gap 13 7 - 16 mmol/L    Glucose 93 74 - 99 mg/dL    BUN 19 8 - 23 mg/dL    CREATININE 1.3 (H) 0.7 - 1.2 mg/dL    GFR Non-African American >60 >=60 mL/min/1.73    GFR African American >60     Calcium 9.9 8.6 - 10.2 mg/dL   Brain Natriuretic Peptide   Result Value Ref Range    Pro-BNP 10,823 (H) 0 - 450 pg/mL   Troponin   Result Value Ref Range    Troponin 0.05 (H) 0.00 - 0.03 ng/mL   Microscopic Urinalysis   Result Value Ref Range    WBC, UA 0-1 0 - 5 /HPF    RBC, UA 1-3 0 - 2 /HPF    Bacteria, UA RARE (A) /HPF   Magnesium   Result Value Ref Range    Magnesium 2.1 1.6 - 2.6 mg/dL   TSH without Reflex   Result Value Ref Range    TSH 6.300 (H) 0.270 - 4.200 uIU/mL   Potassium

## 2019-07-29 NOTE — CONSULTS
Inpatient consult to Urology  Consult performed by: CRUZ Stover - CNP  Consult ordered by: Hai Bey MD           INPATIENT CONSULTATION RECORD FOR  7/29/2019      [facility]  [address]. [place], [address] [facility]  [phone]        REASON FOR CONSULTATION:  urinary retention and scrotal swelling      HISTORY OF PRESENT ILLNESS:      The patient is a 80 y.o. male patient who reports he started having  problems urinating 3 days ago which prompted him to come to the ED yesterday. He reports his scrotum became so swollen that his penis turned inward and he could not urinate. He reports he is uncircumcised and his foreskin became swollen as well. He reports urinary hesitancy, decreased urinary output, straining and post void dribbling. He denies dysuria, gross hematuria prior to catheter, incontinence, fevers, chills, nausea, or vomiting. He has not seen urologist in the past and does not take any  medications at home. He denies history of UTI or kidney stones. He denies history of  cancer. No family history of  cancer. He does not take a blood thinner at home. He feels there was pain with the catheter insertion but it seems it went in well. He has had intermittent pain in his bladder and gross hematuria since the catheter was placed. Much of the above history was obtained form pt's daughter who is at his bedside today.      Past Medical History:   Diagnosis Date    Anemia 05/2010    Aneurysm of aorta ([address] 1990    Bronchitis 2014    [facility] no cardiac physicians    CAD (coronary artery disease)     right ventricular branch of the RCA    Hypertension     Thyroid disease          Past Surgical History:   Procedure Laterality Date    CATARACT REMOVAL  08/2011,10/2011    COLONOSCOPY  01/2016    DIAGNOSTIC CARDIAC CATH LAB PROCEDURE  08/2004    HEMORRHOID SURGERY         Medications Prior to Admission:    Medications Prior to Admission: albuterol sulfate  (90 Base) MCG/ACT inhaler, Inhale 2 puffs into the lungs every 6 hours as needed for Wheezing  Probiotic Product (PROBIOTIC DAILY PO), Take by mouth daily  Multiple Vitamins-Minerals (EYE VITAMINS) CAPS, Take by mouth daily  metoprolol (LOPRESSOR) 25 MG tablet, Take 25 mg by mouth 2 times daily   hydrALAZINE (APRESOLINE) 25 MG tablet, Take 25 mg by mouth 3 times daily   cloNIDine (CATAPRES) 0.2 MG/24HR, Place 1 patch onto the skin once a week 7/28/19 Pt states he takes it once a week every SUNDAY  LORazepam (ATIVAN) 1 MG tablet, Take 1 mg by mouth 2 times daily. mirtazapine (REMERON) 15 MG tablet, Take 15 mg by mouth nightly. potassium chloride (KLOR-CON) 10 MEQ CR tablet, Take 10 mEq by mouth 2 times daily   enalapril (VASOTEC) 20 MG tablet, Take 10 mg by mouth daily   amLODIPine (NORVASC) 5 MG tablet, Take 5 mg by mouth daily. furosemide (LASIX) 20 MG tablet, Take 20 mg by mouth daily   levothyroxine (SYNTHROID) 100 MCG tablet, Take 112 mcg by mouth daily. Allergies:    Reglan [metoclopramide] and Other    Social History:     reports that he quit smoking about 45 years ago. His smoking use included cigarettes. He has a 5.00 pack-year smoking history. He has never used smokeless tobacco. He reports that he does not drink alcohol. Family History:   Non-contributory to this Urological problem  Family history is unknown by patient. Review of Systems   Constitutional: Negative for chills and fever. HENT: Positive for hearing loss. Negative for congestion. Eyes: Negative for photophobia and visual disturbance. Respiratory: Negative for chest tightness, shortness of breath and wheezing. Cardiovascular: Positive for leg swelling (bilateral ). Negative for chest pain and palpitations. Gastrointestinal: Positive for abdominal pain. Negative for diarrhea, nausea and vomiting. Endocrine: Negative for polydipsia and polyphagia.    Genitourinary: Positive for decreased urine volume,

## 2019-07-29 NOTE — PROGRESS NOTES
mouth daily   levothyroxine (SYNTHROID) 100 MCG tablet, Take 112 mcg by mouth daily. Allergies:  Reglan [metoclopramide] and Other    Social History:   TOBACCO:   reports that he quit smoking about 45 years ago. His smoking use included cigarettes. He has a 5.00 pack-year smoking history. He has never used smokeless tobacco.  ETOH:   reports that he does not drink alcohol. Family History:       Family history unknown: Yes       REVIEW OF SYSTEMS:  CONSTITUTIONAL:  Neg   Recent weight changes,fatigue,fever,chills or night sweats  EYES:  Neg  blurriness,tearing,itching or acute change in vision  NOSE:  Neg  rhinorrhea,sneezing,itching,allergy or epistaxis  MOUTH/THROAT:  Neg  bleeding gums,hoarseness or sore throat. RESPIRATORY:   Neg SOB,wheeze,cough,sputum,hemoptysis or bronochitis  CARDIOVASCULAR   Neg : chest pain,palpitations,dyspnea on exertion,orthopnea,paroxysmal nocturnal dyspnea or edema  GASTROINTESTINAL:  Neg   Appetite changes,nausea,vomiting,or diarrhea,indigestion,dysphagia,change in bowel movements, or abdominal pain. NEUROLOGICAL:  Neg  Loss of Consciousness,memeory loss,forgetfulness,periods of confusion,decline in intellect,nervousness,insomina,aphasia or dysarthria. SKIN :  Neg  skin or hair changes,and has no itching,rashes,sores. PHYSICAL EXAM:  /66   Pulse 88   Temp 97.9 °F (36.6 °C) (Temporal)   Resp 18   Ht 5' 6\" (1.676 m)   Wt 146 lb (66.2 kg)   SpO2 95%   BMI 23.57 kg/m²   General appearance: alert, appears stated age, cooperative and no distress  Head: Normocephalic, without obvious abnormality, atraumatic  Eyes: conjunctivae/corneas clear. PERRL, EOM's intact.    Ears: normal external ear canals both ears  Neck: no adenopathy, no carotid bruit, no JVD, supple, symmetrical, trachea midline and thyroid not enlarged, no tenderness/mass/nodules  Lungs: clear to auscultation bilaterally, symmetrical expansion  Heart: regular rate and rhythm, S1, S2 normal, no murmur,

## 2019-07-30 NOTE — DISCHARGE INSTR - COC
5' 6\" (1.676 m)   Wt 143 lb 6.4 oz (65 kg)   SpO2 97%   BMI 23.15 kg/m²     Last documented pain score (0-10 scale): Pain Level: 0  Last Weight:   Wt Readings from Last 1 Encounters:   19 143 lb 6.4 oz (65 kg)     Mental Status:  {IP PT MENTAL STATUS:38445}    IV Access:  { SELAM IV ACCESS:326908360}    Nursing Mobility/ADLs:  Walking   {CHP DME HYRD:011679458}  Transfer  {CHP DME NLBZ:546432111}  Bathing  {CHP DME DHJU:949141465}  Dressing  {CHP DME HKFO:604570781}  Toileting  {CHP DME VUDZ:748928035}  Feeding  {CHP DME AWOA:614614689}  Med Admin  {CHP DME FDNB:308497377}  Med Delivery   { SELAM MED Delivery:948513807}    Wound Care Documentation and Therapy:        Elimination:  Continence:   · Bowel: {YES / HO:79962}  · Bladder: {YES / TZ:03272}  Urinary Catheter: {Urinary Catheter:313492631}   Colostomy/Ileostomy/Ileal Conduit: {YES / DR:00804}       Date of Last BM: ***    Intake/Output Summary (Last 24 hours) at 2019 1709  Last data filed at 2019 1646  Gross per 24 hour   Intake 396 ml   Output 3750 ml   Net -3354 ml     I/O last 3 completed shifts: In: 80 [P.O.:240;  I.V.:276]  Out: 2500 [Urine:2500]    Safety Concerns:     [address] Safety Concerns:075909816}    Impairments/Disabilities:      [address] Impairments/Disabilities:108448034}    Nutrition Therapy:  Current Nutrition Therapy:   [address] Diet List:020497623}    Routes of Feeding: {CHP DME Other Feedings:647523374}  Liquids: {Slp liquid thickness:15177}  Daily Fluid Restriction: {CHP DME Yes amt example:063641210}  Last Modified Barium Swallow with Video (Video Swallowing Test): {Done Not Done DNMB:025167026}    Treatments at the Time of Hospital Discharge:   Respiratory Treatments: ***  Oxygen Therapy:  {Therapy; copd oxygen:48212}  Ventilator:    { ALBINA Vent TVX}    Rehab Therapies: {THERAPEUTIC INTERVENTION:0188653496}  Weight Bearing Status/Restrictions: [address] Weight Bearin}  Other Medical Equipment (for information only, NOT a DME order):  {EQUIPMENT:845820409}  Other Treatments: ***    Patient's personal belongings (please select all that are sent with patient):  {CHP DME Belongings:444590973}    RN SIGNATURE:  {Esignature:279485031}    CASE MANAGEMENT/SOCIAL WORK SECTION    Inpatient Status Date: ***    Readmission Risk Assessment Score:  Readmission Risk              Risk of Unplanned Readmission:        14           Discharging to Facility/ Agency   · Name:   · Address:  · Phone:  · Fax:    Dialysis Facility (if applicable)   · Name:  · Address:  · Dialysis Schedule:  · Phone:  · Fax:    / signature: {Esignature:551528139}    PHYSICIAN SECTION    Prognosis: {Prognosis:6115246446}    Condition at Discharge: [address] [place] [address] Patient Condition:547023053}    Rehab Potential (if transferring to Rehab): {Prognosis:1268056305}    Recommended Labs or Other Treatments After Discharge: ***    Physician Certification: I certify the above information and transfer of Bharath Aguillon  is necessary for the continuing treatment of the diagnosis listed and that he requires {Admit to Appropriate Level of Care:80805} for {GREATER/LESS:731006614} 30 days.      Update Admission H&P: {CHP DME Changes in DMEFX:764514618}    PHYSICIAN SIGNATURE:  {Esignature:372078428}

## 2019-07-30 NOTE — ANESTHESIA PRE PROCEDURE
polyvinyl alcohol (LIQUIFILM TEARS) 1.4 % ophthalmic solution 1 drop  1 drop Both Eyes PRN Kayleigh Patino MD        sodium chloride flush 0.9 % injection 10 mL  10 mL Intravenous PRN Kp Milan MD        perflutren lipid microspheres (DEFINITY) injection 1.65 mg  1.5 mL Intravenous ONCE PRN Que Giraldo,         heparin (porcine) injection 3,970 Units  60 Units/kg Intravenous PRN Que Giraldo, DO        heparin (porcine) injection 1,990 Units  30 Units/kg Intravenous PRN Que Giraldo, DO        heparin 25,000 units in dextrose 5% 250 mL infusion  12 Units/kg/hr Intravenous Continuous Que Giraldo DO 6.6 mL/hr at 07/30/19 0319 10 Units/kg/hr at 07/30/19 0319    opium-belladonna (B&O SUPPRETTES) 16.2-60 MG suppository 30 mg  30 mg Rectal Q8H PRN CRUZ Meraz - CNP        sodium chloride flush 0.9 % injection 10 mL  10 mL Intravenous 2 times per day Kayleigh Patino MD   10 mL at 07/30/19 0837    sodium chloride flush 0.9 % injection 10 mL  10 mL Intravenous PRN Kayleigh Patino MD        acetaminophen (TYLENOL) tablet 650 mg  650 mg Oral Q4H PRN Kayleigh Patino MD   650 mg at 07/28/19 2044    amLODIPine (NORVASC) tablet 5 mg  5 mg Oral Daily Kayleigh Patino MD   5 mg at 07/30/19 0835    enalapril (VASOTEC) tablet 10 mg  10 mg Oral Daily Kayleigh Patino MD   10 mg at 07/30/19 0835    furosemide (LASIX) tablet 20 mg  20 mg Oral Daily Kayleigh Patino MD   20 mg at 07/30/19 0835    hydrALAZINE (APRESOLINE) tablet 25 mg  25 mg Oral TID Kayleigh Patino MD   25 mg at 07/30/19 0835    levothyroxine (SYNTHROID) tablet 112 mcg  112 mcg Oral Daily Kayleigh Patino MD   112 mcg at 07/30/19 0836    metoprolol tartrate (LOPRESSOR) tablet 25 mg  25 mg Oral BID Kayleigh Patino MD   25 mg at 07/30/19 0835    mirtazapine (REMERON) tablet 15 mg  15 mg Oral Nightly Kyaleigh Patino MD   15 mg at 07/29/19 2107    antioxidant multivitamin (OCUVITE) tablet  1 tablet Oral Daily Kayleigh Patino MD   1 tablet at

## 2019-07-30 NOTE — PROCEDURES
Lexiscan Stress Test:    Lexiscan stress completed. No chest pain, no ischemia; underlying A fib    Nuclear SPECT images pending.        Electronically signed by Alex Odell MD on 7/30/2019 at 11:40 AM

## 2019-07-31 NOTE — PROGRESS NOTES
injection 1,990 Units PRN   heparin 25,000 units in dextrose 5% 250 mL infusion Continuous   opium-belladonna (B&O SUPPRETTES) 16.2-60 MG suppository 30 mg Q8H PRN   sodium chloride flush 0.9 % injection 10 mL 2 times per day   sodium chloride flush 0.9 % injection 10 mL PRN   acetaminophen (TYLENOL) tablet 650 mg Q4H PRN   amLODIPine (NORVASC) tablet 5 mg Daily   enalapril (VASOTEC) tablet 10 mg Daily   furosemide (LASIX) tablet 20 mg Daily   hydrALAZINE (APRESOLINE) tablet 25 mg TID   levothyroxine (SYNTHROID) tablet 112 mcg Daily   metoprolol tartrate (LOPRESSOR) tablet 25 mg BID   mirtazapine (REMERON) tablet 15 mg Nightly   antioxidant multivitamin (OCUVITE) tablet Daily   potassium chloride (KLOR-CON M) extended release tablet 10 mEq BID   lactobacillus (CULTURELLE) capsule 1 capsule Daily   albuterol sulfate  (90 Base) MCG/ACT inhaler 2 puff TID PRN   LORazepam (ATIVAN) tablet 1 mg BID PRN   [START ON 8/4/2019] cloNIDine (CATAPRES) tablet 0.1 mg BID   cloNIDine (CATAPRES) 0.2 MG/24HR 1 patch Weekly     iohexol (OMNIPAQUE 240) injection 50 mL ONCE PRN   ioversol (OPTIRAY) 68 % injection 120 mL ONCE PRN       Review of systems:     Heart: as above   Lungs: as above   Eyes: denies changes in vision or discharge. Ears: denies changes in hearing or pain. Nose: denies epistaxis or masses   Throat: denies sore throat or trouble swallowing. Neuro: denies numbness, tingling, tremors. Skin: denies rashes or itching. : denies hematuria, dysuria   GI: denies vomiting, diarrhea   Psych: denies mood changed, anxiety, depression. Physical exam:    Constitutional: A&O x3, communicates well, no acute distress. Eyes: extraocular muscles intact, PERRL. Normal lids & conjunctiva. No icterus. ENT: clear, no bleeding. No external masses. Lips normal formation. Neck: supple, full ROM, no JVD, no bruits, no lymphadenopathy. No masses. trachea midline.   Heart: iregular rate & rhythm, normal S1 &

## 2019-07-31 NOTE — PROGRESS NOTES
Physical Therapy    Facility/Department: [facility]  Initial Assessment    NAME: Anitha Perez  : 1929  MRN: [id number]    Date of Service: 2019    Physical therapy orders received/treatment attempted and chart review completed. Patient in the bathroom and not able to be seen. Will attempt at a later time/date as able. Thank you for the opportunity to assist in the care of this patient.     Zeny Lyons, PT, DPT  License [id number]

## 2019-07-31 NOTE — PROGRESS NOTES
7/31/2019 4:39 PM  Service: Urology  Group: [facility] (Artie/Malathi)    Jenn Ann  [id number]    Subjective: Tolerating gaston  Clot retention this AM  Family concerned about amount of bleeding  Bed rest    Review of Systems  Respiratory: negative  Cardiovascular: negative  Gastrointestinal: negative  Hematologic/lymphatic: negative  Musculoskeletal:negative  Neurological: negative  Endocrine: negative    Scheduled Meds:   hydrALAZINE  35 mg Oral TID    docusate sodium  100 mg Oral Daily    sodium chloride flush  10 mL Intravenous 2 times per day    amLODIPine  5 mg Oral Daily    enalapril  10 mg Oral Daily    furosemide  20 mg Oral Daily    levothyroxine  112 mcg Oral Daily    metoprolol tartrate  25 mg Oral BID    mirtazapine  15 mg Oral Nightly    ocuvite-lutein  1 tablet Oral Daily    potassium chloride  10 mEq Oral BID    lactobacillus  1 capsule Oral Daily    [START ON 8/4/2019] cloNIDine  0.1 mg Oral BID    cloNIDine  1 patch Transdermal Weekly       Objective:  Vitals:    07/31/19 1345   BP: 130/70   Pulse:    Resp:    Temp:    SpO2:          Allergies: Reglan [metoclopramide] and Other    General Appearance: alert and oriented to person, place and time and in no acute distress  Skin: no rash or erythema  Head: normocephalic and atraumatic  Pulmonary/Chest: clear to auscultation bilaterally- no wheezes, rales or rhonchi, normal air movement, no respiratory distress and no chest wall tenderness  Abdomen: soft, non-tender, non-distended, normal bowel sounds, no masses or organomegaly and no inguinal adenopathy  Genitalia: No penile or scrotal swelling or masses.  Extremities: no cyanosis, clubbing or edema and Brant's sign negative bilaterally      Gaston well positioned and draining  clotted urine    Labs:     Recent Labs     07/31/19  0510      K 4.1      CO2 27   BUN 17   CREATININE 1.5*   GLUCOSE 95   CALCIUM 9.5       Lab Results   Component Value Date    HGB 12.4

## 2019-08-01 NOTE — PROGRESS NOTES
Occupational Therapy  OCCUPATIONAL THERAPY INITIAL EVALUATION      Date:2019  Patient Name: Humphrey Donovan  MRN: [id number]  : 1929  Room: [address] [place] [address]A    Evaluating OT:  MECCA Regalado OTR/L  # [id number]      AM-PAC Daily Activity Raw Score:  15/24  Recommended Adaptive Equipment:  TBD as pt progresses    Reason for Admission:  Pt was admitted w/ Urinary Retention, Scrotal Swelling, Johnny LE swelling    Diagnosis:  Acute On Chronic CHF, New Onset A Fib, Urinary Retention      Procedures this admission:  FELICE/Cardioversion 19     Pertinent Medical History:  CAD, HTN     Precautions:  Falls  Cardiac Diet w/ Fluid Restrictions  Sanchez Catheter    Home Living: Pt lives with his Son in a single-level house with 2 MICHELLE and 1 HR/s + 3 inside steps to main floor. Bed/bath on main floor   Bathroom setup:  Tub-Shower, standard commode, Grab bars throughout   Equipment owned:  Raised Commode seat, BSC, Shower Chair    Available Family Assist:  Son reports that he is able to provide 24/7 assist    Prior Level of Function:  IND with ADLs, Transfers and Mobility using No AD for ambulation.  Light IADLs - Family does most.    Driving:  Yes - Errands, Goes to the [employer] to \"visit his wife\"  Occupation:  Retired  from [employer]    Pain Level:  Denies;  Nsg Notified   Additional Complaints:  None    Vitals/Lab Values:  BP 98/52 this AM, Room Air - No C/O Dizziness or Lightheadedness this session    Cognition: A & O x 3 - generally oriented   Able to Follow Multi-Step Commands w/ occasional Min VCs   Memory:  good (-)   Sequencing:  good (-)   Problem solving:  good (-)   Judgement/safety:  good (-)  Additional Comments:  Pt was very pleasant, cooperative, required occasional Min VCs for directionality/problem solving unfamiliar tasks       Functional Assessment:   Initial Eval Status  Date: 19 Treatment Status  Date: Short Term Goals  Treatment frequency: PRN 2-4 x/week   Feeding SUP/Set up    Required

## 2019-08-01 NOTE — PROGRESS NOTES
112 mcg  112 mcg Oral Daily Caridad Capps MD   112 mcg at 07/31/19 0340    metoprolol tartrate (LOPRESSOR) tablet 25 mg  25 mg Oral BID Caridad Capps MD   25 mg at 07/31/19 2537    mirtazapine (REMERON) tablet 15 mg  15 mg Oral Nightly Caridad Capps MD   15 mg at 07/30/19 2131    antioxidant multivitamin (OCUVITE) tablet  1 tablet Oral Daily Caridad Capps MD   1 tablet at 07/31/19 6851    potassium chloride (KLOR-CON M) extended release tablet 10 mEq  10 mEq Oral BID Caridad Capps MD   10 mEq at 07/31/19 5942    lactobacillus (CULTURELLE) capsule 1 capsule  1 capsule Oral Daily Caridad Capps MD   1 capsule at 07/31/19 4432    albuterol sulfate  (90 Base) MCG/ACT inhaler 2 puff  2 puff Inhalation TID PRN Caridad Capps MD        LORazepam (ATIVAN) tablet 1 mg  1 mg Oral BID PRN Caridad Capps MD   1 mg at 07/31/19 1159    [START ON 8/4/2019] cloNIDine (CATAPRES) tablet 0.1 mg  0.1 mg Oral BID Caridad Capps MD        cloNIDine (CATAPRES) 0.2 MG/24HR 1 patch  1 patch Transdermal Weekly Caridad Cpaps MD         Facility-Administered Medications Ordered in Other Encounters   Medication Dose Route Frequency Provider Last Rate Last Dose    iohexol (OMNIPAQUE 240) injection 50 mL  50 mL Oral ONCE PRN Basem A Ashleigh        ioversol (OPTIRAY) 68 % injection 120 mL  120 mL Intravenous ONCE PRN Basem A Ashleigh         Allergies   Allergen Reactions    Reglan [Metoclopramide] Other (See Comments)     tremors    Other      Flu Vaccine  Pneumococcal Vaccines     Active Problems:    New onset atrial fibrillation (HCC)    CHF (congestive heart failure), NYHA class I, acute on chronic, combined ([address]    Acute on chronic congestive heart failure ([address]  Resolved Problems:    * No resolved hospital problems. *    Blood pressure 130/70, pulse 68, temperature 97.7 °F (36.5 °C), temperature source Temporal, resp. rate 16, height 5' 6\" (1.676 m), weight 138 lb 1.6 oz (62.6 kg), SpO2 97 %.     Subjective:  Symptoms:

## 2019-08-01 NOTE — CARE COORDINATION
Plan at discharge remains Home. Hopeful his son will \"stop over\" more to help. He has not spoke with son about hhc, will follow up prior to discharge and make referral if ordered. Heparin gtt continues.  CM/SW will continue to follow

## 2019-08-01 NOTE — PROGRESS NOTES
Linda Hairston is a 80 y.o. male patient.     Current Facility-Administered Medications   Medication Dose Route Frequency Provider Last Rate Last Dose    hydrALAZINE (APRESOLINE) tablet 35 mg  35 mg Oral TID Reji Maldonado DO   35 mg at 07/31/19 1353    docusate sodium (COLACE) capsule 100 mg  100 mg Oral Daily Maday De Leon MD   100 mg at 07/31/19 1456    polyethylene glycol (GLYCOLAX) packet 17 g  17 g Oral Daily PRN Maday De Leon MD        polyvinyl alcohol (LIQUIFILM TEARS) 1.4 % ophthalmic solution 1 drop  1 drop Both Eyes PRN Maday De Leon MD   1 drop at 07/31/19 7577    sodium chloride flush 0.9 % injection 10 mL  10 mL Intravenous PRN Zak Chavez MD        perflutren lipid microspheres (DEFINITY) injection 1.65 mg  1.5 mL Intravenous ONCE PRN Reji Hire, DO        heparin (porcine) injection 3,970 Units  60 Units/kg Intravenous PRN Reji Hire, DO        heparin (porcine) injection 1,990 Units  30 Units/kg Intravenous PRN Reji Hire, DO        heparin 25,000 units in dextrose 5% 250 mL infusion  12 Units/kg/hr Intravenous Continuous Reji Hirsamara, DO 6.6 mL/hr at 07/31/19 0919 10 Units/kg/hr at 07/31/19 0919    opium-belladonna (B&O SUPPRETTES) 16.2-60 MG suppository 30 mg  30 mg Rectal Q8H PRN CRUZ Marks - CNP        sodium chloride flush 0.9 % injection 10 mL  10 mL Intravenous 2 times per day Maday De Leon MD   Stopped at 07/31/19 0924    sodium chloride flush 0.9 % injection 10 mL  10 mL Intravenous PRN Maday De Leon MD        acetaminophen (TYLENOL) tablet 650 mg  650 mg Oral Q4H PRN Maday De Leon MD   650 mg at 07/31/19 1733    amLODIPine (NORVASC) tablet 5 mg  5 mg Oral Daily Maday De Leon MD   5 mg at 07/31/19 0923    enalapril (VASOTEC) tablet 10 mg  10 mg Oral Daily Maday De Leon MD   10 mg at 07/31/19 1428    furosemide (LASIX) tablet 20 mg  20 mg Oral Daily Maday De Leon MD   20 mg at 07/31/19 0666    levothyroxine (SYNTHROID) tablet

## 2019-08-02 NOTE — PROGRESS NOTES
PROGRESS NOTE     CARDIOLOGY    Chief complaint: Seen today for follow up, management & recommendations for atrial fibrillation, hypervolemia    He denies chest pain or shortness of breath today. He was sitting in a chair at the side of the bed visiting with his family member. He was comfortable and in no distress. Wt Readings from Last 3 Encounters:   08/02/19 137 lb (62.1 kg)   08/27/18 162 lb (73.5 kg)   08/11/17 173 lb (78.5 kg)     Temp Readings from Last 3 Encounters:   08/02/19 97.5 °F (36.4 °C) (Temporal)   01/18/17 98.1 °F (36.7 °C) (Temporal)   03/29/13 98 °F (36.7 °C) (Oral)     BP Readings from Last 3 Encounters:   08/02/19 (!) 111/53   07/30/19 (!) 111/59   08/27/18 112/64     Pulse Readings from Last 3 Encounters:   08/02/19 53   08/27/18 62   08/11/17 62         Intake/Output Summary (Last 24 hours) at 8/2/2019 1731  Last data filed at 8/2/2019 1410  Gross per 24 hour   Intake 852.6 ml   Output 752 ml   Net 100.6 ml       Recent Labs     07/31/19  0510 07/31/19  1540 08/02/19  0512   WBC  --  8.5  --    HGB  --  11.7*  --    HCT  --  37.6  --    MCV  --  92.8  --     204 212     Recent Labs     07/31/19  0510 08/01/19  0446 08/02/19  0512    140 139   K 4.1 4.3 4.4    101 99   CO2 27 27 29   BUN 17 27* 35*   CREATININE 1.5* 1.6* 1.9*     No results for input(s): PROTIME, INR in the last 72 hours. No results for input(s): CKTOTAL, CKMB, CKMBINDEX, TROPONINI in the last 72 hours. No results for input(s): BNP in the last 72 hours. No results for input(s): CHOL, HDL, TRIG in the last 72 hours.     Invalid input(s): CHOLHDLR, LDLCALCU        apixaban (ELIQUIS) tablet 2.5 mg BID   hydrALAZINE (APRESOLINE) tablet 35 mg TID   docusate sodium (COLACE) capsule 100 mg Daily   polyethylene glycol (GLYCOLAX) packet 17 g Daily PRN   polyvinyl alcohol (LIQUIFILM TEARS) 1.4 % ophthalmic solution 1 drop PRN   sodium chloride flush 0.9 % injection 10 mL PRN   perflutren lipid microspheres no abnormal murmurs or gallops. No heave. Lungs: CTA. No accessory muscles. Abd: soft, non-tender. Normal bowel sounds. Neuro: Full ROM X 4, EOMI, no tremors. EXT: No bilateral lower extremity edema  Skin: warm, dry, intact. Good turgor. Psych: A&O x 3, normal behavior, not anxious. Assessment/Recommendations  1. Atrial fibrillation of unknown duration. His heart rate remains well controlled on the metoprolol. 2. Moderate left ventricular hypertrophy. 3. Spontaneous echo contrast and early thrombus formation seen in the left atrial appendage and left atrium on his transesophageal echo. Unable to do cardioversion with the early thrombus formation. Okay for discharge with cardiology. I will change his heparin to dose adjusted Eliquis for discharge. 4. No room to titrate medications today. 5. Hypertension. Very well controlled. 6. Urinary obstruction and hematuria. The blood is much improved and his Sanchez today. 7. Chronic renal insufficiency has remained stable. Creatinine 1.9.  8. COPD/asthma.

## 2019-08-02 NOTE — PROGRESS NOTES
Blood pressure (!) 111/53, pulse 53, temperature 97.5 °F (36.4 °C), temperature source Temporal, resp. rate 16, height 5' 6\" (1.676 m), weight 137 lb (62.1 kg), SpO2 98 %. No fever. Output: Producing urine and producing stool. HEENT: Normal HEENT exam.    Lungs:  Normal effort. Heart: Irregular rhythm. Abdomen: Abdomen is soft. Bowel sounds are normal.   There is no abdominal tenderness. Assessment:  (New onset A. fib  Coronary artery disease, mild elevated troponin no symptoms of chest pain. Volume overload and CHF  Lower urinary tract obstruction slight retention  Scrotal swelling  Gross hematuria  Bladder spasms  Hypertension  History of asthma and COPD  Acute renal failure  Plan: d/c lisinopril, and lasix, start iv fluids, consult renal).        Kevin Chris MD  8/2/2019

## 2019-08-03 NOTE — CONSULTS
Consults   [facility] Nephrology Consult       Patient's Name:  Fam Davidson  Date of Service:  8/3/2019  Requesting    Seamus Gamino MD     Reason for Consult:             Acute renal failure     Chief Complaint:                  Acute urinary retention , hematuria     Information obtained from: patient and chart     History of Present Illness:89 yrs old AAARYA     Came in on 7/28/19 with complains of unable to void  , hematuria , testicular swelling and had   Gaston catheter placed by er , hematuria subsequently resolved , and serum creatinine which   Peaked at 1.9 now is at 1.7 , he is non oliguric . Baseline creatinine last year was at 1.5--1.6 range     He at  Present is weak , has a gaston in place draining clear urine , alert and responsive   Renal sonogram suggested a left renal cyst but no hydronephrosis   Urology comments noted - BPH , cysto planned as an out pt , urine cytology negative     Past Medical History:   Diagnosis Date    Anemia 05/2010    Aneurysm of aorta ([address] 1990    Bronchitis 2014    [facility] no cardiac physicians    CAD (coronary artery disease)     right ventricular branch of the RCA    Hypertension     Thyroid disease          Past Surgical History:   Procedure Laterality Date    CATARACT REMOVAL  08/2011,10/2011    COLONOSCOPY  01/2016    DIAGNOSTIC CARDIAC CATH LAB PROCEDURE  08/2004    HEMORRHOID SURGERY      TRANSESOPHAGEAL ECHOCARDIOGRAM  07/30/2019    Dr [facility]         Social History     Socioeconomic History    Marital status:       Spouse name: Not on file    Number of children: Not on file    Years of education: Not on file    Highest education level: Not on file   Occupational History    Not on file   Social Needs    Financial resource strain: Not on file    Food insecurity:     Worry: Not on file     Inability: Not on file    Transportation needs:     Medical: Not on file     Non-medical: Not on file   Tobacco Use    Smoking Other    Review of Systems     Pertinent positives and negatives as in HPI. Other systems reviewed and were negative. LAST 3 CMP  Recent Labs     08/01/19  0446 08/02/19  0512 08/03/19  0435    139 140   K 4.3 4.4 5.0    99 103   CO2 27 29 25   BUN 27* 35* 41*   CREATININE 1.6* 1.9* 1.7*   GLUCOSE 97 100* 100*   CALCIUM 9.2 9.1 8.7       LAST 3 CBC:  Recent Labs     07/31/19  1540 08/02/19  0512 08/03/19  0435   WBC 8.5  --  6.9   RBC 4.05  --  4.10   HGB 11.7*  --  11.9*   HCT 37.6  --  38.6    212 210         Intake/Output Summary (Last 24 hours) at 8/3/2019 0902  Last data filed at 8/3/2019 0520  Gross per 24 hour   Intake 1370 ml   Output 876 ml   Net 494 ml     Patient Vitals for the past 24 hrs:   BP Temp Temp src Pulse Resp SpO2   08/03/19 0814 127/71 97 °F (36.1 °C) Temporal 77 16 97 %   08/03/19 0017 (!) 114/56 97.3 °F (36.3 °C) Oral 62 16 98 %   08/02/19 2024 117/70 -- -- 61 -- --   08/02/19 1601 (!) 111/53 97.5 °F (36.4 °C) Temporal 53 16 98 %   08/02/19 1342 (!) 96/58 -- -- -- -- --       General appearance:  Appears stated age  Skin: color, texture, turgor normal. No rashes or lesions. Neck: supple, no masses, no JVD, No carotid bruits, No thyromegaly  Lungs: respirations unlabored. Clear to auscultation. No rales, wheezes, no rhonchi. Equal chest excursion with respirations. Heart RRR. pmi not laterally displaced. No S3 or S4, no rub  Abdomen:  Soft, ND, NT. Bowel sounds present. No HSM. No epigastric bruit, no increased Ao pulsation,  Extremities: warm to touch. No LE edema or cyanosis. No fem bruit. 2+ upstrokes and equal bilaterally. PT/Pedal 2+ equal bilat  Neuro: Cr N 2-12 grossly intact. No focal motor neuro deficit. No alteration in recent remote memory.     Assessment/Plan                                  Resolved FRANCOISE - obstructive uropathy - hematuria - BPH - now gaston in place , non oliguric                                 Baseline cr at 1.5-1.6 - possible

## 2019-08-03 NOTE — PROGRESS NOTES
Gets together: None     Attends [employer] service: None     Active member of club or organization: None     Attends meetings of clubs or organizations: None     Relationship status: None    Intimate partner violence:     Fear of current or ex partner: None     Emotionally abused: None     Physically abused: None     Forced sexual activity: None   Other Topics Concern    None   Social History Narrative    None       IV:    sodium chloride 75 mL/hr at 08/02/19 2001       PRN: polyethylene glycol, polyvinyl alcohol, sodium chloride flush, perflutren lipid microspheres, heparin (porcine), heparin (porcine), opium-belladonna, sodium chloride flush, acetaminophen, albuterol sulfate HFA, LORazepam    Scheduled:    apixaban  2.5 mg Oral BID    hydrALAZINE  35 mg Oral TID    docusate sodium  100 mg Oral Daily    sodium chloride flush  10 mL Intravenous 2 times per day    amLODIPine  5 mg Oral Daily    levothyroxine  112 mcg Oral Daily    metoprolol tartrate  25 mg Oral BID    mirtazapine  15 mg Oral Nightly    ocuvite-lutein  1 tablet Oral Daily    potassium chloride  10 mEq Oral BID    lactobacillus  1 capsule Oral Daily    [START ON 8/4/2019] cloNIDine  0.1 mg Oral BID    cloNIDine  1 patch Transdermal Weekly       Lab Results   Component Value Date     08/03/2019    K 5.0 08/03/2019    BUN 41 08/03/2019    CREATININE 1.7 08/03/2019        Lab Results   Component Value Date    HGB 11.9 08/03/2019    HCT 38.6 08/03/2019       No results found for: PSA    Constitutional: Tired  Eyes: negative  Ears, nose, mouth, throat, and face: negative  Respiratory: Bronchitis  Cardiovascular: CAD  Gastrointestinal: negative  Genitourinary: Blood  Musculoskeletal: negative  Neurological: negative  Behavioral/Psych: negative  Endocrine: Low thyroid    Skin dry, without rashes  Respirations non-labored, intact  Abdomen soft, non-tender, non-distended. Active bowel sounds.   Alert and oriented x3  Sanchez draining clear,

## 2019-08-03 NOTE — PROGRESS NOTES
(36.3 °C), temperature source Oral, resp. rate 16, height 5' 6\" (1.676 m), weight 137 lb (62.1 kg), SpO2 98 %.   awake, alert  Hydrated  Calm, heart irr. irr  Lungs decreased bs but clear  abd soft, no pain, good bs  No pitting edema    Assessment & Plan  New afib, thrombus, cad  Copd  edilma on ckd3  Hypovolemia  Hematuria, bph  Rehydrated, almost at baseline  Cardiac work up  [employer], DO  8/3/2019

## 2019-08-04 NOTE — PROGRESS NOTES
Acute on chronic congestive heart failure (HCC)    Plan:             Resolved FRANCOISE - obstructive uropathy - hematuria - BPH - now gaston in place , non oliguric                                 Baseline cr at 1.5-1.6 - possible hypertensive nephrosclerosis , current cr 1.5                                At baseline                                     A fib controlled on B Blockers / echo early thrombus in LA appendage / for                                    eliquis on discharge                                   LVH                                    CAD /CHF - edema better - good po intake -- will d/c IVF                                     HTN well controlled on current antihypertensive meds                                    H/O Aortic aneurysm                                                                  Continue present care                                     Thank you for allowing us to participate in this patients medical care     [address]

## 2019-08-09 NOTE — H&P
tablet, Take 15 mg by mouth nightly. potassium chloride (KLOR-CON) 10 MEQ CR tablet, Take 10 mEq by mouth 2 times daily   enalapril (VASOTEC) 20 MG tablet, Take 10 mg by mouth daily   amLODIPine (NORVASC) 5 MG tablet, Take 5 mg by mouth daily. furosemide (LASIX) 20 MG tablet, Take 20 mg by mouth daily   levothyroxine (SYNTHROID) 100 MCG tablet, Take 112 mcg by mouth daily.        Allergies:  Reglan [metoclopramide] and Other     Social History:   TOBACCO:   reports that he quit smoking about 45 years ago. His smoking use included cigarettes. He has a 5.00 pack-year smoking history. He has never used smokeless tobacco.  ETOH:   reports that he does not drink alcohol.     Family History:   Family History       Family history unknown: Yes            REVIEW OF SYSTEMS:  CONSTITUTIONAL:  Neg   Recent weight changes,fatigue,fever,chills or night sweats  EYES:  Neg  blurriness,tearing,itching or acute change in vision  NOSE:  Neg  rhinorrhea,sneezing,itching,allergy or epistaxis  MOUTH/THROAT:  Neg  bleeding gums,hoarseness or sore throat. RESPIRATORY:   Neg SOB,wheeze,cough,sputum,hemoptysis or bronochitis  CARDIOVASCULAR   Neg : chest pain,palpitations,dyspnea on exertion,orthopnea,paroxysmal nocturnal dyspnea or edema  GASTROINTESTINAL:  Neg   Appetite changes,nausea,vomiting,or diarrhea,indigestion,dysphagia,change in bowel movements, or abdominal pain. NEUROLOGICAL:  Neg  Loss of Consciousness,memeory loss,forgetfulness,periods of confusion,decline in intellect,nervousness,insomina,aphasia or dysarthria. SKIN :  Neg  skin or hair changes,and has no itching,rashes,sores.     PHYSICAL EXAM:  /66   Pulse 88   Temp 97.9 °F (36.6 °C) (Temporal)   Resp 18   Ht 5' 6\" (1.676 m)   Wt 146 lb (66.2 kg)   SpO2 95%   BMI 23.57 kg/m²   General appearance: alert, appears stated age, cooperative and no distress  Head: Normocephalic, without obvious abnormality, atraumatic  Eyes: conjunctivae/corneas clear.  PERRL,

## 2019-08-28 PROBLEM — G45.9 TIA (TRANSIENT ISCHEMIC ATTACK): Status: ACTIVE | Noted: 2019-01-01

## 2019-08-29 NOTE — ED PROVIDER NOTES
light-headedness and headaches. Hematological: Negative for adenopathy. All other systems reviewed and are negative. Physical Exam   Constitutional: He is oriented to person, place, and time. Vital signs are normal. He appears well-developed and well-nourished. Non-toxic appearance. He appears ill (Chronically ill-appearing male in no acute distress). No distress. HENT:   Head: Normocephalic and atraumatic. Nose: Nose normal.   Mouth/Throat: Mucous membranes are not dry. Eyes: Pupils are equal, round, and reactive to light. Conjunctivae, EOM and lids are normal.   Neck: Normal range of motion. Neck supple. Cardiovascular: Normal rate, regular rhythm, S1 normal, S2 normal and normal heart sounds. No murmur heard. Pulmonary/Chest: Effort normal. No stridor. No respiratory distress. He has decreased breath sounds (mildly diminished at the bilateral bases). He has no wheezes. He has no rhonchi. He has no rales. He exhibits no tenderness. Abdominal: Soft. Bowel sounds are normal. He exhibits no distension. There is no tenderness. There is no rigidity, no rebound and no guarding. Musculoskeletal: He exhibits edema (+1 edema bilaterally). Neurological: He is alert and oriented to person, place, and time. He has normal strength. He displays no tremor. No sensory deficit. He exhibits normal muscle tone. Coordination normal. GCS eye subscore is 4. GCS verbal subscore is 5. GCS motor subscore is 6. Skin: Skin is warm, dry and intact. Nursing note and vitals reviewed. NIH Stroke Scale/Score at time of initial evaluation:  1A: Level of Consciousness 0 - alert; keenly responsive   1B: Ask Month and Age 0 - answers both questions correctly   1C:  Tell Patient To Open and Close Eyes, then Hand  Squeeze 0 - performs both tasks correctly   2: Test Horizontal Extraocular Movements 0 - normal   3: Test Visual Fields 0 - no visual loss   4: Test Facial Palsy 0 - normal symmetric movement   5A: Test Left Arm Motor Drift 0 - no drift, limb holds 90 (or 45) degrees for full 10 seconds   5B: Test Right Arm Motor Drift 0 - no drift, limb holds 90 (or 45) degrees for full 10 seconds   6A: Test Left Leg Motor Drift 0 - no drift; leg holds 30 degree position for full 5 seconds   6B: Test Right Leg Motor Drift 0 - no drift; leg holds 30 degree position for full 5 seconds   7: Test Limb Ataxia   (FNF/Heel-Shin) 0 - absent   8: Test Sensation 0 - normal; no sensory loss   9: Test Language/Aphasia 0 - no aphasia, normal   10: Test Dysarthria 0 - normal   11: Test Extinction/Inattention 0 - no abnormality   Total 0         Procedures     MDM  Number of Diagnoses or Management Options  Acute cystitis with hematuria:   Elevated troponin:   Pneumonia due to organism:   TIA (transient ischemic attack):   Diagnosis management comments: The patient is a 70-year-old male who presents to the emergency department complaining of intermittent numbness and tingling of his bilateral hands of blurry vision. The patient is chronically ill-appearing, hemodynamically stable, and in no acute distress. Differential diagnosis includes but is not limited to TIA versus CVA versus electrolyte abnormality versus STEMI versus infectious etiology. Urinalysis evident of nitrite positive urinary tract infection, no leukocytosis, mild anemia of 10.4, CKD with a creatinine of 1.5, lactic acidosis with a lactic of 2.3, troponin is elevated 0.08, EKG is similar compared to prior, chest x-ray shows bibasilar opacities consistent with pneumonia. Treated patient with Rocephin and doxycycline in the emergency department. Also treated patient with 1 albuterol treatment. The patient remained hemodynamically stable. NIH was 0. Consulted with Dr. Chuckie Dang, who agreed to admit the patient. EKG:  This EKG is signed and interpreted by me.     Rate: 76  Rhythm: Atrial fibrillation  Interpretation: Atrial fibrillation, normal axis, wide QRS,

## 2019-08-29 NOTE — H&P
texture, turgor normal. No rashes or lesions  Neurologic:Mental status: Alert, oriented, thought content appropriate  Moving all extremities. DATA:  Recent Labs     08/28/19  1810 08/29/19  0439   WBC 10.0 9.2   HGB 10.4* 10.8*   HCT 33.7* 33.7*   MCV 92.8 90.6    220     Recent Labs     08/28/19  1810 08/29/19  0439    140   K 4.5 3.0*    100   CO2 28 27   BUN 31* 26*   CREATININE 1.5* 1.2   GLUCOSE 108* 95     Recent Labs     08/28/19  1810   AST 26   ALT 16   BILITOT 1.0   ALKPHOS 91     Recent Labs     08/28/19  1810   TROPONINI 0.08*     Lab Results   Component Value Date    INR 1.7 08/28/2019    INR 1.1 09/11/2018    INR 1.0 05/07/2018    PROTIME 19.4 (H) 08/28/2019    PROTIME 12.0 09/11/2018    PROTIME 12.1 05/07/2018        U/A:    Lab Results   Component Value Date    COLORU Yellow 08/28/2019    PHUR 6.0 08/28/2019    WBCUA 5-10 08/28/2019    RBCUA 1-3 08/28/2019    BACTERIA MODERATE 08/28/2019    CLARITYU Clear 08/28/2019    SPECGRAV 1.015 08/28/2019    LEUKOCYTESUR TRACE 08/28/2019    UROBILINOGEN 2.0 08/28/2019    BILIRUBINUR Negative 08/28/2019    BLOODU Negative 08/28/2019    GLUCOSEU Negative 08/28/2019          RADIOLOGY:   XR CHEST PORTABLE   Final Result      Bibasilar opacities notable on the right might suggest areas of   subsegmental atelectasis or pneumonia. Opacities on the right have   increased compared to prior. Short-term follow-up may be helpful for   further evaluation. CT Head WO Contrast   Final Result      No CT evidence of an acute intracranial process or hemorrhage. Chronic ischemic changes.              ASSESSMENT:  Acute on chronic congestive heart failure  Atrial fibrillation patient on Eliquis  Question TIA, neurology to evaluate  Abnormal chest x-ray atelectasis versus infection versus pulmonary edema  Patient Active Problem List   Diagnosis Code    Anemia D64.9    Hypertension I10    CAD (coronary artery disease) I25.10    GERD

## 2019-08-29 NOTE — PROGRESS NOTES
OCCUPATIONAL THERAPY INITIAL EVALUATION      Date:2019  Patient Name: Silvestre Tate  MRN: [id number]  : 1929  Room: 8202/82-A    Evaluating OT: MECCA Mondragon, OTR/L [id number]    AM-PAC Daily Activity Raw Score:   Recommended Adaptive Equipment: none     Modified [place] Scale (MRS)  Score     Description  0             No symptoms  1             No significant disability despite symptoms  2             Slight disability; able to look after own affairs  3             Moderate disability; able to ambulate without assist/ requires assist with ADLs  4             Moderate/Severe disability;requires assist to ambulate/assist with ADLs  5             Severe disability;bedridden/incontinent   6               Score: 4    Diagnosis: TIA (head CT clear)   Surgery: n/a   Pertinent Medical History: CHF, CAD, HTN, anemia, a-fib   Precautions:  Falls, NC O2 (with no prior use at home)     Home Living: Pt lives with son (who is home  now) in a 1 story home with 3 step(s) to enter and 1 rail(s); bed/bath on 1 level   Bathroom setup: tub/shower unit, grab bars   Equipment owned: extended tub bench, toilet riser, HB (but only uses toilet riser)  Prior Level of Function: IND with ADLs , assist with IADLs from son; using no AD for functional mobility (but has ww)  Driving: no  Occupation: retired    Pain Level: 0/10  Cognition: A&O: 3/4; Follows multi step directions   Memory:  good    Sequencing:  good    Problem solving:  fair    Judgement/safety:  fair      Functional Assessment:   Initial Eval Status  Date: 19 Treatment Status  Date: Short Term Goals  Treatment frequency: PRN    Feeding Set-up   IND   Grooming Min A (standing at sink)   SBA   UB Dressing Min A (due to limited ROM)   SBA   LB Dressing Min A (pt able to josep B socks sitting EOB)  SBA    Bathing Min A (simulated task)  SBA    Toileting Min A  SBA   Bed Mobility  Log roll: SBA  Supine to sit: SBA   Sit to supine: SBA   Log roll

## 2019-08-29 NOTE — CONSULTS
Inpatient Cardiology Consultation      Reason for Consult:  Elevated proBNP    Consulting Physician: Dr. Estuardo Ramachandran     Requesting Physician:  Dr. Miki Hodge     Date of Consultation: 8/29/2019    HISTORY OF PRESENT ILLNESS:   Mr. Marlon Pascal 81 y/o male who is known to Dr. Anna Tomlinson; most recently evaluated by Dr. Otilia Sweeney on 7/29/2019 with complaints of new onset of AF/SOB/BLE edema and scrotal edema. During that admission, scheduled for FELICE/DCCV. However, on FELICE 7/30/2019 showed early thrombus formation in the LA appendage. DCCV was cancelled. Lexiscan stress test (7/30/2019) showed \"nuclear stress test was uninterpretable\" per Dr. Otilia Sweeney. No further testing was needed secondary to the patient having no further CP per records. Urology was consulted for urinary retention/BPH and bladder spasms. He was discharged home with gaston catheter (recently removed 1 week ago). He followed up with PCP on discharge (8/6/2019) and was started on Lasix 20 mg QD. Patient has been urinating well but continues to feel abdominal fullness, worsening BLE edema,  orthopnea, PND and probable weight gain x 1-2 weeks. He developed sudden onset of BLE and BUE \"numbness\" with accompanied \"blurred/fuzzy\" vision that lasted \"several hours\" according to patient's son and started ~2:30 PM. He had no sudden weakness, slurred speech, facial droop or syncopal event. Denied CP, nausea or vomiting. He denies having these symptoms prior to 8/28/2019. Therefore, patient presented to [facility] on 8/28/2019. Upon arrival to the ED, he was asymptomatic. He was given IV antibiotics for acute cystitis with hematuria and pneumonia. Troponin 0.08, proBNP 14,177 (most recent proBNP 10,823 on 7/28/2019), SCr 1.5>>1.2, WBC 10.0, H/H 10.4/33.7, platelet count 491. INR 1.7. CT head - no acute findings. CXR: bibasilar opacities notable on the right might suggest areas of subsegmental atelectasis or pneumonia. VS: 92% on RA, /74, HR 81, Afebrile.  He was admitted right pulmonary nodule. Mediastinal adenopathy. 19. Urinary retention/BPH - Follows with Dr. Beatriz Fox - gaston placed (7/2019) - since has been removed. 20. Pulmonary nodules - noted on CTA (2017) - CT chest - shows pulmonary nodules remain with no significant change. 20. Diastolic HFpEF (admission 7/28/2019) - CXR - possible small right pleural effusion. proBNP [id number]. 21. New onset of AF (duration unknown; captured on EKG 7/29/2019) [id number] score at least 5. Started on Eliquis 2.5 mg BID (Age, SCr > 1.5). 22 FELICE: 7/30/2019 - Moderate concentric left ventricular hypertrophy.  Normal left ventricle size and systolic function. Moderately dilated left atrium. Heavy spontaneous echo contrast & early thrombus formation in the LA appendage. Moderately reduced right ventricle systolic function. Physiologic mitral regurgitation. Trace aortic regurgitation. 23. DCCV cancelled on 7/30/2019.   24. [place] nuclear stress test: 7/30/2019 was \"uninterpretable\" - per Dr. Hans Lara; no further testing was needed due to the patient did not have any active ischemic symptoms. Medications Prior to admit:  Prior to Admission medications    Medication Sig Start Date End Date Taking?  Authorizing Provider   apixaban (ELIQUIS) 2.5 MG TABS tablet Take 1 tablet by mouth 2 times daily 8/4/19   Moisés Lerma, DO   cloNIDine (CATAPRES) 0.1 MG tablet Take 1 tablet by mouth 2 times daily 8/4/19   Moisés Lerma, DO   hydrALAZINE (APRESOLINE) 10 MG tablet Take 3.5 tablets by mouth 3 times daily 8/4/19   Moisés Lerma, DO   albuterol sulfate  (90 Base) MCG/ACT inhaler Inhale 2 puffs into the lungs every 6 hours as needed for Wheezing    Historical Provider, MD   Probiotic Product (PROBIOTIC DAILY PO) Take by mouth daily    Historical Provider, MD   Multiple Vitamins-Minerals (EYE VITAMINS) CAPS Take by mouth daily    Historical Provider, MD   metoprolol (LOPRESSOR) 25 MG tablet Take 25 mg by mouth 2 times daily  8/5/13

## 2019-08-29 NOTE — ED NOTES
Bed: 12  Expected date:   Expected time:   Means of arrival:   Comments:  triage     Nikia Nava RN  08/28/19 2016

## 2019-08-30 NOTE — CONSULTS
5.00     Types: Cigarettes     Last attempt to quit: 1974     Years since quittin.2    Smokeless tobacco: Never Used   Substance Use Topics    Alcohol use: No    Drug use: Not on file     Family History   Family history unknown: Yes     Past Surgical History:   Procedure Laterality Date    CATARACT REMOVAL  2011,10/2011    COLONOSCOPY  2016    DIAGNOSTIC CARDIAC CATH LAB PROCEDURE  2004    HEMORRHOID SURGERY      TRANSESOPHAGEAL ECHOCARDIOGRAM  2019    Dr Margie Meyer     Past Medical History:   Diagnosis Date    Anemia 2010    Aneurysm of aorta ([address] 1990    Bronchitis     [facility] no cardiac physicians    CAD (coronary artery disease)     right ventricular branch of the RCA    Hypertension     Thyroid disease      Review of Systems   Constitutional: Negative for activity change, appetite change, chills, fatigue and fever. HENT: Negative for drooling, postnasal drip, rhinorrhea, sore throat and tinnitus. Respiratory: Negative for cough, chest tightness, shortness of breath and wheezing. Cardiovascular: Negative for chest pain and palpitations. Gastrointestinal: Negative for abdominal distention, abdominal pain, constipation and vomiting. Genitourinary: Negative for frequency and hematuria. Musculoskeletal: Negative for arthralgias and back pain. Neurological: Positive for numbness. Negative for dizziness, seizures, facial asymmetry, weakness, light-headedness and headaches. Objective:   BP (!) 182/82   Pulse 85   Temp 97.9 °F (36.6 °C) (Temporal)   Resp 18   Ht 5' 6\" (1.676 m)   Wt 140 lb (63.5 kg)   SpO2 98%   BMI 22.60 kg/m²     Physical Exam   Constitutional: He is oriented to person, place, and time. He appears well-developed and well-nourished. HENT:   Head: Normocephalic and atraumatic. Mouth/Throat: Oropharynx is clear and moist.   Eyes: Pupils are equal, round, and reactive to light.  Conjunctivae are normal.   Neck: Normal

## 2019-08-30 NOTE — PROGRESS NOTES
Paged Dr. Jr Helms regarding patients complaint of left neck pain and unable to breathe through left nares. Patient is very concerned and persistent and currently refusing his FELICE/cardioversion until this issue is addressed. Notified cardiology of this as well.

## 2019-08-30 NOTE — PROGRESS NOTES
Daily Progress Note  Austin Mixon MD      Subjective:   Patient has no new complaint, appears in no distress. .     Past Medical History:   Diagnosis Date    Anemia 05/2010    Aneurysm of aorta ([address] 1990    Bronchitis 2014    [facility] no cardiac physicians    CAD (coronary artery disease)     right ventricular branch of the RCA    Hypertension     Thyroid disease        Past Surgical History:   Procedure Laterality Date    CATARACT REMOVAL  08/2011,10/2011    COLONOSCOPY  01/2016    DIAGNOSTIC CARDIAC CATH LAB PROCEDURE  08/2004    HEMORRHOID SURGERY      TRANSESOPHAGEAL ECHOCARDIOGRAM  07/30/2019    Dr Jordyn Harding       Scheduled Meds:   apixaban  5 mg Oral BID    cloNIDine  0.1 mg Oral BID    levothyroxine  112 mcg Oral Daily    mirtazapine  15 mg Oral Nightly    ipratropium-albuterol  1 ampule Inhalation Q4H WA    doxycycline (VIBRAMYCIN) IV  100 mg Intravenous Q12H    cefTRIAXone (ROCEPHIN) IV  1 g Intravenous Q12H    furosemide  40 mg Intravenous Daily    metoprolol tartrate  50 mg Oral BID    spironolactone  25 mg Oral Daily    potassium chloride  40 mEq Oral Once    sodium chloride flush  10 mL Intravenous 2 times per day     Continuous Infusions:  PRN Meds:albuterol sulfate HFA, LORazepam, sodium chloride flush, magnesium hydroxide, acetaminophen  DIET CARDIAC;    Objective:     I/O last 3 completed shifts: In: 200 [IV Piggyback:200]  Out: 900 [Urine:900]  No intake/output data recorded. Stool Occurrence: 0  Vitals:    08/30/19 0800 08/30/19 1014 08/30/19 1523 08/30/19 1542   BP: (!) 182/82  (!) 113/59    Pulse: 85  87    Resp: 18 16 16    Temp: 97.9 °F (36.6 °C)  98.2 °F (36.8 °C)    TempSrc: Temporal  Temporal    SpO2: 98% 95% 94% 95%   Weight:       Height:           General appearance: Appears comfortable, up at the site of the bed with physical therapy.     Neck: no adenopathy,, supple, symmetrical, trachea midline and thyroid not enlarged  Lungs: Diminished breath sounds

## 2019-08-30 NOTE — PROGRESS NOTES
atrial fibrillation (HCC)    CHF (congestive heart failure), NYHA class I, acute on chronic, combined ([facility] [address]    Acute on chronic congestive heart failure (HCC)    TIA (transient ischemic attack)     1. CHF/SOB: Symptomatic. Diurese. Optimize medications. 2. Afib: Rate controlled On Eliquis. Considered FELICE-DCCV given symptomatic Afib with resultant CHF but patient having neck pain issues. Medically manage. 3. TIA: Continue Eliquis. Neurology following. 4. CAD: Medical management. 5. HTN: Observe. 6. CKD: Follow labs. Maryam Wallace D.O.   Cardiologist  Cardiology, [facility]

## 2019-08-31 NOTE — PROGRESS NOTES
Output 925 ml   Net -725 ml     LUNGS:  Bilateral tight breath sounds  CARDIOVASCULAR:  Weak sounds s1 s2  Data    CBC with Differential:    Lab Results   Component Value Date    WBC 9.5 08/30/2019    RBC 3.45 08/30/2019    HGB 10.1 08/30/2019    HCT 31.2 08/30/2019     08/30/2019    MCV 90.4 08/30/2019    MCH 29.3 08/30/2019    MCHC 32.4 08/30/2019    RDW 15.6 08/30/2019    LYMPHOPCT 3.4 08/30/2019    PROMYELOPCT 1.7 08/30/2019    MONOPCT 4.2 08/30/2019    BASOPCT 0.5 08/30/2019    MONOSABS 0.38 08/30/2019    LYMPHSABS 0.29 08/30/2019    EOSABS 0.32 08/30/2019    BASOSABS 0.00 08/30/2019     CMP:    Lab Results   Component Value Date     08/30/2019    K 3.4 08/30/2019    K 3.0 08/29/2019     08/30/2019    CO2 28 08/30/2019    BUN 22 08/30/2019    CREATININE 1.3 08/30/2019    GFRAA >60 08/30/2019    LABGLOM >60 08/30/2019    GLUCOSE 102 08/30/2019    PROT 7.8 08/28/2019    LABALBU 4.0 08/28/2019    CALCIUM 9.3 08/30/2019    BILITOT 1.0 08/28/2019    ALKPHOS 91 08/28/2019    AST 26 08/28/2019    ALT 16 08/28/2019     PT/INR:    Lab Results   Component Value Date    PROTIME 19.4 08/28/2019    INR 1.7 08/28/2019       ASSESSMENT AND PLAN      Active Problems:    Atrial fibrillation  Converted            Copd  Ad spiriva          Electronically signed by Salud Hess MD on 8/31/2019 at 8:10 AM

## 2019-08-31 NOTE — PROGRESS NOTES
Inhalation Daily Sol Parikh MD        [facility]) tablet 5 mg  5 mg Oral BID Brandin Mu-ism, DO   5 mg at 08/31/19 0846    albuterol sulfate  (90 Base) MCG/ACT inhaler 2 puff  2 puff Inhalation Q6H PRN Jaci Santa MD        cloNIDine (CATAPRES) tablet 0.1 mg  0.1 mg Oral BID Jaci Santa MD   0.1 mg at 08/31/19 0847    levothyroxine (SYNTHROID) tablet 112 mcg  112 mcg Oral Daily Jaci Santa MD   112 mcg at 08/31/19 0636    LORazepam (ATIVAN) tablet 1 mg  1 mg Oral BID PRN Jaci Santa MD        mirtazapine (REMERON) tablet 15 mg  15 mg Oral Nightly Jaci Santa MD   15 mg at 08/30/19 2228    cefTRIAXone (ROCEPHIN) 1 g in sterile water 10 mL IV syringe  1 g Intravenous Q12H Jaci Santa MD   1 g at 08/31/19 0015    furosemide (LASIX) injection 40 mg  40 mg Intravenous Daily Jaci Santa MD   40 mg at 08/31/19 0847    metoprolol tartrate (LOPRESSOR) tablet 50 mg  50 mg Oral BID Brandin Mu-ism, DO   50 mg at 08/31/19 2644    spironolactone (ALDACTONE) tablet 25 mg  25 mg Oral Daily Brandin Mu-ism, DO   25 mg at 08/31/19 0847    potassium chloride (KLOR-CON M) extended release tablet 40 mEq  40 mEq Oral Once Brandin Mu-ism, DO   Stopped at 08/29/19 1344    sodium chloride flush 0.9 % injection 10 mL  10 mL Intravenous 2 times per day Jaci Santa MD   10 mL at 08/31/19 0849    sodium chloride flush 0.9 % injection 10 mL  10 mL Intravenous PRN Jaci Santa MD   10 mL at 08/30/19 1402    magnesium hydroxide (MILK OF MAGNESIA) 400 MG/5ML suspension 30 mL  30 mL Oral Daily PRN Jaci Santa MD        acetaminophen (TYLENOL) tablet 650 mg  650 mg Oral Q4H PRN Jaci Santa MD         Facility-Administered Medications Ordered in Other Encounters   Medication Dose Route Frequency Provider Last Rate Last Dose    iohexol (OMNIPAQUE 240) injection 50 mL  50 mL Oral ONCE PRN Basem A Ashleigh        ioversol (OPTIRAY) 68 % injection 120 mL  120 mL Intravenous ONCE PRN Basem A

## 2019-09-01 NOTE — PROGRESS NOTES
hour   Intake 840 ml   Output 1200 ml   Net -360 ml     LUNGS:  Dec breath sounds no wheeze  CARDIOVASCULAR:  s1 s2   Data    CBC with Differential:    Lab Results   Component Value Date    WBC 10.1 09/01/2019    RBC 3.76 09/01/2019    HGB 10.8 09/01/2019    HCT 34.8 09/01/2019     09/01/2019    MCV 92.6 09/01/2019    MCH 28.7 09/01/2019    MCHC 31.0 09/01/2019    RDW 15.3 09/01/2019    LYMPHOPCT 6.5 09/01/2019    PROMYELOPCT 1.7 08/30/2019    MONOPCT 8.0 09/01/2019    BASOPCT 0.5 09/01/2019    MONOSABS 0.81 09/01/2019    LYMPHSABS 0.65 09/01/2019    EOSABS 0.44 09/01/2019    BASOSABS 0.05 09/01/2019     CMP:    Lab Results   Component Value Date     08/31/2019    K 3.5 08/31/2019    K 3.0 08/29/2019    CL 97 08/31/2019    CO2 29 08/31/2019    BUN 23 08/31/2019    CREATININE 1.4 08/31/2019    GFRAA 58 08/31/2019    LABGLOM 58 08/31/2019    GLUCOSE 122 08/31/2019    PROT 7.8 08/28/2019    LABALBU 4.0 08/28/2019    CALCIUM 9.9 08/31/2019    BILITOT 1.0 08/28/2019    ALKPHOS 91 08/28/2019    AST 26 08/28/2019    ALT 16 08/28/2019     PT/INR:    Lab Results   Component Value Date    PROTIME 19.4 08/28/2019    INR 1.7 08/28/2019       ASSESSMENT AND PLAN      Active Problems:    Atrial fibrillation ([address]  Plan: converted        Chronic diastolic (congestive) heart failure ([address]  Plan: bumex    Hypoxia; eval          Electronically signed by Ruma Correa MD on 9/1/2019 at 6:45 AM

## 2019-09-02 NOTE — PROGRESS NOTES
INPATIENT CARDIOLOGY FOLLOW-UP    Name: Vu Nagy    Age: 80 y.o. Date of Admission: 8/28/2019  8:16 PM    Date of Service: 9/2/2019    Chief Complaint: Follow-up for ornery atherosclerosis, persistent atrial fibrillation/flutter, chronic diastolic heart failure, hypertension, chronic kidney disease, possible transient cerebral ischemia    Interim History: The patient presently denies symptoms and is no longer tachypneic with conversation. Acceptable rate control of his atrial fibrillation has been maintained with continued recorded gradual fluid mobilization. Review of Systems: The remainder of a complete multisystem review including consitutional, central nervous, respiratory, circulatory, gastrointestinal, genitourinary, endocrinologic, hematologic, musculoskeletal and psychiatric are negative. Problem List:  Patient Active Problem List   Diagnosis    Anemia    Hypertension    CAD (coronary artery disease)    GERD (gastroesophageal reflux disease)    Former smoker, stopped smoking many years ago    Atrial fibrillation ([address]    CHF (congestive heart failure), NYHA class I, acute on chronic, combined ([address]    Acute on chronic congestive heart failure ([address]    TIA (transient ischemic attack)    Chronic diastolic (congestive) heart failure (HCC)    Stage 3 chronic kidney disease (HCC)       Allergies:   Allergies   Allergen Reactions    Reglan [Metoclopramide] Other (See Comments)     tremors    Other      Flu Vaccine  Pneumococcal Vaccines       Current Medications:  Current Facility-Administered Medications   Medication Dose Route Frequency Provider Last Rate Last Dose    lactulose (CHRONULAC) 10 GM/15ML solution 20 g  20 g Oral TID Joe Browning MD        vitamin B-12 (CYANOCOBALAMIN) tablet 1,000 mcg  1,000 mcg Oral Daily Joe Browning MD   1,000 mcg at 09/01/19 4918    bumetanide (BUMEX) tablet 1 mg  1 mg Oral Daily Joe Browning MD   1 mg at 09/01/19 09/02/2019    MCHC 31.9 09/02/2019    RDW 15.1 09/02/2019     09/02/2019    MPV 10.2 09/02/2019     No results for input(s): ALKPHOS, ALT, AST, PROT, BILITOT, BILIDIR, LABALBU in the last 72 hours. Lab Results   Component Value Date    MG 2.0 08/29/2019     Lab Results   Component Value Date    PROTIME 19.4 08/28/2019    INR 1.7 08/28/2019     Lab Results   Component Value Date    TSH 6.300 07/28/2019     No components found for: [facility]  No results found for: TRIG  No results found for: HDL  No results found for: [facility]    Cardiac Tests:  Telemetry findings reviewed: atrial fibrillation with a mean ventricular response of approximately 70 bpm, no new tachy/bradyarrhythmias overnight      ASSESSMENT / PLAN: On a clinical basis, the patient remains stable from a cardiovascular standpoint with no present tachypnea upon limited conversation and no ambulation over the past 24 to 48 hours to further assess his symptomatic response. Continued gradual fluid mobilization appears advisable with renal function presently stable and following modification of his apixaban dosage, present appropriate dosing, though continued needs of careful monitoring will be necessary to appropriately dose anticoagulation on the combined basis of his age weight and serum creatinine. He continued rate control and anticoagulation strategy will be maintained in the face of his persistent atrial arrhythmias. Note: This report was completed utilizing computer voice recognition software. Every effort has been made to ensure accuracy, however; inadvertent computerized transcription errors may be present. Susan Peralta.  Rubens Jo, [address] [place] [address] Cardiology

## 2019-09-10 NOTE — PROGRESS NOTES
[facility]  [facility]. Estfeani Quiroga M.D. Marylu Hernandez M.D.  Vinay Bartholomew. Carlyn Velasquez M.D. Sophia Meeks M.D. Edenilson Hankins M.D. Hema Wiseman MD                [facility]   8/11/1929  Edu Shine MD      This 25-year-old man is seen for outpatient cardiac follow-up today. He has a history of atrial fibrillation with new documentation of atrial fibrillation in July 2019. FELICE in July 2019 showed thrombus formation in the atrial appendage and therefore cardioversion was not performed. Since discharge, he has been using a loop diuretic. He has not had dyspnea, chest pain or palpitations. He denies dizziness. He is able to go 20 feet or so down the driveway and back. Medical History:  1. HTN/LVH  2. Hypothyroidism - on replacement therapy   3. COPD/Asthma - Denies home O2  4. Admission [facility], 2004 with dyspnea and edema. 5. Echo, 2004. Concentric LVH, mild diffuse hypokinesis, EF 50%, Stage II diastolic dysfunction and elevated LA pressure.    6. MPS, 2004. EF 50%, mild global hypokinesis, partially reversible anterior perfusion abnormality. 7. Cardiac catheterization [facility], 08/2004. Normal wall motion, EF 61%, 60% D1 narrowing, 25% OM2 narrowing, 80% narrowing of a RV branch of the dominant RCA.  Dobutrex, 01/09/2008. No ST changes from baseline, no chest pain, gated images normal, EF 51%, small partially reversible inferior perfusion abnormality. TID noted. However, abnormalities are felt to be artifactual due to marked gut uptake of radioisotope. Low to intermediate risk study. Exercise EKG, 06/11/2014. 4.6 METS, 83% MPHR. No pain, arrhythmia or ST segment shift. EKG indeterminate for ischemia due to submaximal heart rate. 8. Hemorrhoidectomy.    9. Family history negative for premature vascular disease. 10. Ex-smoker: abstinent since 1980.    11. Echo, 12/13/2007. LA 4.2. LVH. EF >55%. RVSP 27. E/E' 12.89.     12. Mild anemia.  Hemoglobin 11.8, WBC

## 2020-01-01 ENCOUNTER — OFFICE VISIT (OUTPATIENT)
Dept: CARDIOLOGY CLINIC | Age: 85
End: 2020-01-01
Payer: MEDICARE

## 2020-01-01 ENCOUNTER — APPOINTMENT (OUTPATIENT)
Dept: GENERAL RADIOLOGY | Age: 85
DRG: 283 | End: 2020-01-01
Payer: MEDICARE

## 2020-01-01 ENCOUNTER — HOSPITAL ENCOUNTER (INPATIENT)
Age: 85
LOS: 9 days | DRG: 283 | End: 2020-07-08
Attending: EMERGENCY MEDICINE | Admitting: INTERNAL MEDICINE
Payer: MEDICARE

## 2020-01-01 ENCOUNTER — APPOINTMENT (OUTPATIENT)
Dept: ULTRASOUND IMAGING | Age: 85
DRG: 283 | End: 2020-01-01
Payer: MEDICARE

## 2020-01-01 ENCOUNTER — APPOINTMENT (OUTPATIENT)
Dept: NUCLEAR MEDICINE | Age: 85
DRG: 283 | End: 2020-01-01
Payer: MEDICARE

## 2020-01-01 VITALS
TEMPERATURE: 97 F | SYSTOLIC BLOOD PRESSURE: 47 MMHG | HEIGHT: 66 IN | WEIGHT: 137 LBS | BODY MASS INDEX: 22.02 KG/M2 | HEART RATE: 47 BPM | OXYGEN SATURATION: 75 % | RESPIRATION RATE: 19 BRPM

## 2020-01-01 VITALS
HEART RATE: 57 BPM | DIASTOLIC BLOOD PRESSURE: 56 MMHG | RESPIRATION RATE: 16 BRPM | HEIGHT: 66 IN | BODY MASS INDEX: 22.73 KG/M2 | WEIGHT: 141.4 LBS | SYSTOLIC BLOOD PRESSURE: 118 MMHG

## 2020-01-01 LAB
AADO2: 101.2 MMHG
AADO2: 132.9 MMHG
AADO2: 135.1 MMHG
AADO2: 143.1 MMHG
AADO2: 431.4 MMHG
AADO2: 567.7 MMHG
ACANTHOCYTES: ABNORMAL
ADDENDUM ELECTROPHORESIS URINE RANDOM: NORMAL
ALBUMIN SERPL-MCNC: 2.4 G/DL (ref 3.5–5.2)
ALBUMIN SERPL-MCNC: 2.6 G/DL (ref 3.5–4.7)
ALBUMIN SERPL-MCNC: 2.8 G/DL (ref 3.5–5.2)
ALBUMIN SERPL-MCNC: 3 G/DL (ref 3.5–5.2)
ALP BLD-CCNC: 67 U/L (ref 40–129)
ALP BLD-CCNC: 67 U/L (ref 40–129)
ALP BLD-CCNC: 69 U/L (ref 40–129)
ALP BLD-CCNC: 70 U/L (ref 40–129)
ALP BLD-CCNC: 73 U/L (ref 40–129)
ALP BLD-CCNC: 74 U/L (ref 40–129)
ALP BLD-CCNC: 78 U/L (ref 40–129)
ALPHA-1-GLOBULIN: 0.2 G/DL (ref 0.2–0.4)
ALPHA-2-GLOBULIN: 0.7 G/FL (ref 0.5–1)
ALT SERPL-CCNC: 12 U/L (ref 0–40)
ALT SERPL-CCNC: 12 U/L (ref 0–40)
ALT SERPL-CCNC: 13 U/L (ref 0–40)
ALT SERPL-CCNC: 14 U/L (ref 0–40)
ALT SERPL-CCNC: 14 U/L (ref 0–40)
ALT SERPL-CCNC: 16 U/L (ref 0–40)
ALT SERPL-CCNC: 17 U/L (ref 0–40)
AMPHETAMINE SCREEN, URINE: NOT DETECTED
AMYLASE FLUID: 100 U/L
ANION GAP SERPL CALCULATED.3IONS-SCNC: 13 MMOL/L (ref 7–16)
ANION GAP SERPL CALCULATED.3IONS-SCNC: 14 MMOL/L (ref 7–16)
ANION GAP SERPL CALCULATED.3IONS-SCNC: 15 MMOL/L (ref 7–16)
ANION GAP SERPL CALCULATED.3IONS-SCNC: 16 MMOL/L (ref 7–16)
ANION GAP SERPL CALCULATED.3IONS-SCNC: 16 MMOL/L (ref 7–16)
ANION GAP SERPL CALCULATED.3IONS-SCNC: 18 MMOL/L (ref 7–16)
ANION GAP SERPL CALCULATED.3IONS-SCNC: 18 MMOL/L (ref 7–16)
ANISOCYTOSIS: ABNORMAL
APPEARANCE FLUID: NORMAL
APTT: 211.3 SEC (ref 24.5–35.1)
APTT: 29.5 SEC (ref 24.5–35.1)
APTT: 30.3 SEC (ref 24.5–35.1)
APTT: 61.2 SEC (ref 24.5–35.1)
APTT: 61.2 SEC (ref 24.5–35.1)
AST SERPL-CCNC: 24 U/L (ref 0–39)
AST SERPL-CCNC: 25 U/L (ref 0–39)
AST SERPL-CCNC: 26 U/L (ref 0–39)
AST SERPL-CCNC: 28 U/L (ref 0–39)
AST SERPL-CCNC: 29 U/L (ref 0–39)
AST SERPL-CCNC: 33 U/L (ref 0–39)
AST SERPL-CCNC: 33 U/L (ref 0–39)
B.E.: -0.6 MMOL/L (ref -3–3)
B.E.: 0.1 MMOL/L (ref -3–3)
B.E.: 2.2 MMOL/L (ref -3–3)
B.E.: 3.6 MMOL/L (ref -3–3)
B.E.: 3.9 MMOL/L (ref -3–3)
B.E.: 4.8 MMOL/L (ref -3–3)
B.E.: 5.2 MMOL/L (ref -3–3)
B.E.: 6.7 MMOL/L (ref -3–3)
BACTERIA: ABNORMAL /HPF
BARBITURATE SCREEN URINE: NOT DETECTED
BASOPHILIC STIPPLING: ABNORMAL
BASOPHILS ABSOLUTE: 0 E9/L (ref 0–0.2)
BASOPHILS ABSOLUTE: 0.02 E9/L (ref 0–0.2)
BASOPHILS ABSOLUTE: 0.02 E9/L (ref 0–0.2)
BASOPHILS RELATIVE PERCENT: 0.1 % (ref 0–2)
BASOPHILS RELATIVE PERCENT: 0.1 % (ref 0–2)
BASOPHILS RELATIVE PERCENT: 0.3 % (ref 0–2)
BENZODIAZEPINE SCREEN, URINE: NOT DETECTED
BETA GLOBULIN: 0.8 G/DL (ref 0.8–1.3)
BILIRUB SERPL-MCNC: 0.6 MG/DL (ref 0–1.2)
BILIRUB SERPL-MCNC: 1.1 MG/DL (ref 0–1.2)
BILIRUB SERPL-MCNC: 1.2 MG/DL (ref 0–1.2)
BILIRUB SERPL-MCNC: 1.2 MG/DL (ref 0–1.2)
BILIRUB SERPL-MCNC: 1.5 MG/DL (ref 0–1.2)
BILIRUB SERPL-MCNC: 1.6 MG/DL (ref 0–1.2)
BILIRUB SERPL-MCNC: 1.8 MG/DL (ref 0–1.2)
BILIRUBIN URINE: NEGATIVE
BLOOD CULTURE, ROUTINE: NORMAL
BLOOD, URINE: ABNORMAL
BUN BLDV-MCNC: 49 MG/DL (ref 8–23)
BUN BLDV-MCNC: 49 MG/DL (ref 8–23)
BUN BLDV-MCNC: 51 MG/DL (ref 8–23)
BUN BLDV-MCNC: 52 MG/DL (ref 8–23)
BUN BLDV-MCNC: 55 MG/DL (ref 8–23)
BUN BLDV-MCNC: 56 MG/DL (ref 8–23)
BUN BLDV-MCNC: 59 MG/DL (ref 8–23)
BUN BLDV-MCNC: 59 MG/DL (ref 8–23)
BUN BLDV-MCNC: 60 MG/DL (ref 8–23)
BUN BLDV-MCNC: 60 MG/DL (ref 8–23)
BUN BLDV-MCNC: 61 MG/DL (ref 8–23)
BUN BLDV-MCNC: 61 MG/DL (ref 8–23)
BUN BLDV-MCNC: 63 MG/DL (ref 8–23)
BUN BLDV-MCNC: 64 MG/DL (ref 8–23)
BUN BLDV-MCNC: 64 MG/DL (ref 8–23)
BUN BLDV-MCNC: 66 MG/DL (ref 8–23)
BURR CELLS: ABNORMAL
CALCIUM SERPL-MCNC: 10 MG/DL (ref 8.6–10.2)
CALCIUM SERPL-MCNC: 10.1 MG/DL (ref 8.6–10.2)
CALCIUM SERPL-MCNC: 9.1 MG/DL (ref 8.6–10.2)
CALCIUM SERPL-MCNC: 9.3 MG/DL (ref 8.6–10.2)
CALCIUM SERPL-MCNC: 9.4 MG/DL (ref 8.6–10.2)
CALCIUM SERPL-MCNC: 9.5 MG/DL (ref 8.6–10.2)
CALCIUM SERPL-MCNC: 9.8 MG/DL (ref 8.6–10.2)
CALCIUM SERPL-MCNC: 9.8 MG/DL (ref 8.6–10.2)
CALCIUM SERPL-MCNC: 9.9 MG/DL (ref 8.6–10.2)
CALCIUM SERPL-MCNC: 9.9 MG/DL (ref 8.6–10.2)
CANNABINOID SCREEN URINE: NOT DETECTED
CELL COUNT FLUID TYPE: NORMAL
CHLORIDE BLD-SCNC: 100 MMOL/L (ref 98–107)
CHLORIDE BLD-SCNC: 101 MMOL/L (ref 98–107)
CHLORIDE BLD-SCNC: 102 MMOL/L (ref 98–107)
CHLORIDE BLD-SCNC: 98 MMOL/L (ref 98–107)
CHLORIDE BLD-SCNC: 99 MMOL/L (ref 98–107)
CHLORIDE URINE RANDOM: 74 MMOL/L
CK MB: 4.7 NG/ML (ref 0–7.7)
CK MB: 5.7 NG/ML (ref 0–7.7)
CK MB: 7.6 NG/ML (ref 0–7.7)
CLARITY: CLEAR
CO2: 24 MMOL/L (ref 22–29)
CO2: 26 MMOL/L (ref 22–29)
CO2: 26 MMOL/L (ref 22–29)
CO2: 27 MMOL/L (ref 22–29)
CO2: 28 MMOL/L (ref 22–29)
CO2: 29 MMOL/L (ref 22–29)
CO2: 30 MMOL/L (ref 22–29)
COCAINE METABOLITE SCREEN URINE: NOT DETECTED
COHB: 0.5 % (ref 0–1.5)
COHB: 0.5 % (ref 0–1.5)
COHB: 0.6 % (ref 0–1.5)
COHB: 0.6 % (ref 0–1.5)
COHB: 0.7 % (ref 0–1.5)
COHB: 0.8 % (ref 0–1.5)
COHB: 1 % (ref 0–1.5)
COHB: 1.5 % (ref 0–1.5)
COLOR FLUID: NORMAL
COLOR: YELLOW
CREAT SERPL-MCNC: 1.8 MG/DL (ref 0.7–1.2)
CREAT SERPL-MCNC: 1.8 MG/DL (ref 0.7–1.2)
CREAT SERPL-MCNC: 1.9 MG/DL (ref 0.7–1.2)
CREAT SERPL-MCNC: 2 MG/DL (ref 0.7–1.2)
CREAT SERPL-MCNC: 2.1 MG/DL (ref 0.7–1.2)
CREAT SERPL-MCNC: 2.2 MG/DL (ref 0.7–1.2)
CREATININE URINE: 33 MG/DL (ref 40–278)
CRITICAL: ABNORMAL
CULTURE, BLOOD 2: NORMAL
CULTURE, RESPIRATORY: ABNORMAL
CULTURE, RESPIRATORY: ABNORMAL
D DIMER: 972 NG/ML DDU
DATE ANALYZED: ABNORMAL
DATE OF COLLECTION: ABNORMAL
EKG ATRIAL RATE: 326 BPM
EKG ATRIAL RATE: 76 BPM
EKG ATRIAL RATE: 94 BPM
EKG ATRIAL RATE: 96 BPM
EKG Q-T INTERVAL: 354 MS
EKG Q-T INTERVAL: 432 MS
EKG Q-T INTERVAL: 434 MS
EKG Q-T INTERVAL: 442 MS
EKG QRS DURATION: 116 MS
EKG QRS DURATION: 118 MS
EKG QRS DURATION: 126 MS
EKG QRS DURATION: 130 MS
EKG QTC CALCULATION (BAZETT): 467 MS
EKG QTC CALCULATION (BAZETT): 510 MS
EKG QTC CALCULATION (BAZETT): 519 MS
EKG QTC CALCULATION (BAZETT): 522 MS
EKG R AXIS: -18 DEGREES
EKG R AXIS: -32 DEGREES
EKG R AXIS: -35 DEGREES
EKG R AXIS: -39 DEGREES
EKG T AXIS: 107 DEGREES
EKG T AXIS: 123 DEGREES
EKG T AXIS: 139 DEGREES
EKG T AXIS: 159 DEGREES
EKG VENTRICULAR RATE: 105 BPM
EKG VENTRICULAR RATE: 83 BPM
EKG VENTRICULAR RATE: 84 BPM
EKG VENTRICULAR RATE: 87 BPM
ELECTROPHORESIS: ABNORMAL
EOSINOPHILS ABSOLUTE: 0 E9/L (ref 0.05–0.5)
EOSINOPHILS ABSOLUTE: 0 E9/L (ref 0.05–0.5)
EOSINOPHILS ABSOLUTE: 0.02 E9/L (ref 0.05–0.5)
EOSINOPHILS ABSOLUTE: 0.06 E9/L (ref 0.05–0.5)
EOSINOPHILS ABSOLUTE: 0.07 E9/L (ref 0.05–0.5)
EOSINOPHILS RELATIVE PERCENT: 0.1 % (ref 0–6)
EOSINOPHILS RELATIVE PERCENT: 0.1 % (ref 0–6)
EOSINOPHILS RELATIVE PERCENT: 0.3 % (ref 0–6)
EOSINOPHILS RELATIVE PERCENT: 0.8 % (ref 0–6)
EOSINOPHILS RELATIVE PERCENT: 0.9 % (ref 0–6)
EPITHELIAL CELLS, UA: ABNORMAL /HPF
FENTANYL SCREEN, URINE: NOT DETECTED
FIO2: 100 %
FIO2: 100 %
FIO2: 40 %
FIO2: 60 %
FLUID TYPE: NORMAL
GAMMA GLOBULIN: 1.4 G/DL (ref 0.7–1.6)
GFR AFRICAN AMERICAN: 34
GFR AFRICAN AMERICAN: 36
GFR AFRICAN AMERICAN: 38
GFR AFRICAN AMERICAN: 40
GFR AFRICAN AMERICAN: 43
GFR AFRICAN AMERICAN: 43
GFR NON-AFRICAN AMERICAN: 34 ML/MIN/1.73
GFR NON-AFRICAN AMERICAN: 36 ML/MIN/1.73
GFR NON-AFRICAN AMERICAN: 38 ML/MIN/1.73
GFR NON-AFRICAN AMERICAN: 40 ML/MIN/1.73
GFR NON-AFRICAN AMERICAN: 43 ML/MIN/1.73
GFR NON-AFRICAN AMERICAN: 43 ML/MIN/1.73
GLUCOSE BLD-MCNC: 101 MG/DL (ref 74–99)
GLUCOSE BLD-MCNC: 102 MG/DL (ref 74–99)
GLUCOSE BLD-MCNC: 106 MG/DL (ref 74–99)
GLUCOSE BLD-MCNC: 107 MG/DL (ref 74–99)
GLUCOSE BLD-MCNC: 109 MG/DL (ref 74–99)
GLUCOSE BLD-MCNC: 109 MG/DL (ref 74–99)
GLUCOSE BLD-MCNC: 113 MG/DL (ref 74–99)
GLUCOSE BLD-MCNC: 115 MG/DL (ref 74–99)
GLUCOSE BLD-MCNC: 117 MG/DL (ref 74–99)
GLUCOSE BLD-MCNC: 121 MG/DL (ref 74–99)
GLUCOSE BLD-MCNC: 122 MG/DL (ref 74–99)
GLUCOSE BLD-MCNC: 88 MG/DL (ref 74–99)
GLUCOSE BLD-MCNC: 89 MG/DL (ref 74–99)
GLUCOSE BLD-MCNC: 94 MG/DL (ref 74–99)
GLUCOSE BLD-MCNC: 96 MG/DL (ref 74–99)
GLUCOSE BLD-MCNC: 99 MG/DL (ref 74–99)
GLUCOSE URINE: 100 MG/DL
HCO3: 26.5 MMOL/L (ref 22–26)
HCO3: 26.8 MMOL/L (ref 22–26)
HCO3: 27.4 MMOL/L (ref 22–26)
HCO3: 27.5 MMOL/L (ref 22–26)
HCO3: 27.6 MMOL/L (ref 22–26)
HCO3: 28 MMOL/L (ref 22–26)
HCO3: 28 MMOL/L (ref 22–26)
HCO3: 28.7 MMOL/L (ref 22–26)
HCT VFR BLD CALC: 26.2 % (ref 37–54)
HCT VFR BLD CALC: 26.7 % (ref 37–54)
HCT VFR BLD CALC: 27 % (ref 37–54)
HCT VFR BLD CALC: 27.5 % (ref 37–54)
HCT VFR BLD CALC: 27.9 % (ref 37–54)
HCT VFR BLD CALC: 28 % (ref 37–54)
HCT VFR BLD CALC: 28.9 % (ref 37–54)
HCT VFR BLD CALC: 29.8 % (ref 37–54)
HCT VFR BLD CALC: 32.3 % (ref 37–54)
HEMATOCRIT FLUID: <2 %
HEMOGLOBIN: 8.1 G/DL (ref 12.5–16.5)
HEMOGLOBIN: 8.2 G/DL (ref 12.5–16.5)
HEMOGLOBIN: 8.7 G/DL (ref 12.5–16.5)
HEMOGLOBIN: 8.8 G/DL (ref 12.5–16.5)
HEMOGLOBIN: 8.8 G/DL (ref 12.5–16.5)
HEMOGLOBIN: 9 G/DL (ref 12.5–16.5)
HEMOGLOBIN: 9.8 G/DL (ref 12.5–16.5)
HHB: 0.9 % (ref 0–5)
HHB: 1 % (ref 0–5)
HHB: 1.4 % (ref 0–5)
HHB: 2.7 % (ref 0–5)
HHB: 2.7 % (ref 0–5)
HHB: 22.7 % (ref 0–5)
HHB: 4.2 % (ref 0–5)
HHB: 5.3 % (ref 0–5)
HYALINE CASTS: ABNORMAL /LPF (ref 0–2)
HYPOCHROMIA: ABNORMAL
HYPOCHROMIA: ABNORMAL
IMMATURE GRANULOCYTES #: 0.04 E9/L
IMMATURE GRANULOCYTES #: 0.05 E9/L
IMMATURE GRANULOCYTES %: 0.5 % (ref 0–5)
IMMATURE GRANULOCYTES %: 0.7 % (ref 0–5)
IMMUNOFIXATION RESULT, SERUM: NORMAL
IMMUNOFIXATION URINE: NORMAL
INR BLD: 1.5
IRON SATURATION: 15 % (ref 20–55)
IRON: 34 MCG/DL (ref 59–158)
KETONES, URINE: NEGATIVE MG/DL
LAB: ABNORMAL
LACTATE DEHYDROGENASE: 266 U/L (ref 135–225)
LACTIC ACID: 1.6 MMOL/L (ref 0.5–2.2)
LACTIC ACID: 2.3 MMOL/L (ref 0.5–2.2)
LACTIC ACID: 5 MMOL/L (ref 0.5–2.2)
LD, FLUID: 168 U/L
LEUKOCYTE ESTERASE, URINE: NEGATIVE
LV EF: 38 %
LVEF MODALITY: NORMAL
LYMPHOCYTES ABSOLUTE: 0 E9/L (ref 1.5–4)
LYMPHOCYTES ABSOLUTE: 0.09 E9/L (ref 1.5–4)
LYMPHOCYTES ABSOLUTE: 0.21 E9/L (ref 1.5–4)
LYMPHOCYTES ABSOLUTE: 0.32 E9/L (ref 1.5–4)
LYMPHOCYTES ABSOLUTE: 0.39 E9/L (ref 1.5–4)
LYMPHOCYTES RELATIVE PERCENT: 0.9 % (ref 20–42)
LYMPHOCYTES RELATIVE PERCENT: 1.8 % (ref 20–42)
LYMPHOCYTES RELATIVE PERCENT: 2.8 % (ref 20–42)
LYMPHOCYTES RELATIVE PERCENT: 3.5 % (ref 20–42)
LYMPHOCYTES RELATIVE PERCENT: 5.3 % (ref 20–42)
Lab: ABNORMAL
Lab: NORMAL
Lab: NORMAL
MAGNESIUM: 2.1 MG/DL (ref 1.6–2.6)
MAGNESIUM: 2.2 MG/DL (ref 1.6–2.6)
MAGNESIUM: 2.3 MG/DL (ref 1.6–2.6)
MCH RBC QN AUTO: 25.3 PG (ref 26–35)
MCH RBC QN AUTO: 25.6 PG (ref 26–35)
MCH RBC QN AUTO: 25.6 PG (ref 26–35)
MCH RBC QN AUTO: 25.7 PG (ref 26–35)
MCH RBC QN AUTO: 25.9 PG (ref 26–35)
MCH RBC QN AUTO: 26 PG (ref 26–35)
MCH RBC QN AUTO: 26 PG (ref 26–35)
MCHC RBC AUTO-ENTMCNC: 29.4 % (ref 32–34.5)
MCHC RBC AUTO-ENTMCNC: 30 % (ref 32–34.5)
MCHC RBC AUTO-ENTMCNC: 30.2 % (ref 32–34.5)
MCHC RBC AUTO-ENTMCNC: 30.3 % (ref 32–34.5)
MCHC RBC AUTO-ENTMCNC: 30.3 % (ref 32–34.5)
MCHC RBC AUTO-ENTMCNC: 30.4 % (ref 32–34.5)
MCHC RBC AUTO-ENTMCNC: 30.9 % (ref 32–34.5)
MCHC RBC AUTO-ENTMCNC: 31.1 % (ref 32–34.5)
MCHC RBC AUTO-ENTMCNC: 32 % (ref 32–34.5)
MCV RBC AUTO: 81.1 FL (ref 80–99.9)
MCV RBC AUTO: 82.4 FL (ref 80–99.9)
MCV RBC AUTO: 83 FL (ref 80–99.9)
MCV RBC AUTO: 83.2 FL (ref 80–99.9)
MCV RBC AUTO: 84.5 FL (ref 80–99.9)
MCV RBC AUTO: 84.8 FL (ref 80–99.9)
MCV RBC AUTO: 85.1 FL (ref 80–99.9)
MCV RBC AUTO: 86.5 FL (ref 80–99.9)
MCV RBC AUTO: 88 FL (ref 80–99.9)
METAMYELOCYTES RELATIVE PERCENT: 0.9 % (ref 0–1)
METAMYELOCYTES RELATIVE PERCENT: 0.9 % (ref 0–1)
METER GLUCOSE: 152 MG/DL (ref 74–99)
METER GLUCOSE: 181 MG/DL (ref 74–99)
METER GLUCOSE: 86 MG/DL (ref 74–99)
METHADONE SCREEN, URINE: NOT DETECTED
METHB: 0.2 % (ref 0–1.5)
METHB: 0.3 % (ref 0–1.5)
METHB: 0.3 % (ref 0–1.5)
METHB: 0.4 % (ref 0–1.5)
METHB: 0.4 % (ref 0–1.5)
METHB: 0.5 % (ref 0–1.5)
MODE: ABNORMAL
MODE: AC
MONOCYTE, FLUID: 20 %
MONOCYTES ABSOLUTE: 0.27 E9/L (ref 0.1–0.95)
MONOCYTES ABSOLUTE: 0.32 E9/L (ref 0.1–0.95)
MONOCYTES ABSOLUTE: 0.37 E9/L (ref 0.1–0.95)
MONOCYTES ABSOLUTE: 0.38 E9/L (ref 0.1–0.95)
MONOCYTES ABSOLUTE: 0.41 E9/L (ref 0.1–0.95)
MONOCYTES RELATIVE PERCENT: 2.6 % (ref 2–12)
MONOCYTES RELATIVE PERCENT: 4.3 % (ref 2–12)
MONOCYTES RELATIVE PERCENT: 4.4 % (ref 2–12)
MONOCYTES RELATIVE PERCENT: 4.9 % (ref 2–12)
MONOCYTES RELATIVE PERCENT: 5.6 % (ref 2–12)
MRSA CULTURE ONLY: NORMAL
MYELOCYTE PERCENT: 0.9 % (ref 0–0)
NEUTROPHIL, FLUID: 80 %
NEUTROPHILS ABSOLUTE: 6.4 E9/L (ref 1.8–7.3)
NEUTROPHILS ABSOLUTE: 6.81 E9/L (ref 1.8–7.3)
NEUTROPHILS ABSOLUTE: 7.28 E9/L (ref 1.8–7.3)
NEUTROPHILS ABSOLUTE: 8.73 E9/L (ref 1.8–7.3)
NEUTROPHILS ABSOLUTE: 8.93 E9/L (ref 1.8–7.3)
NEUTROPHILS RELATIVE PERCENT: 87.5 % (ref 43–80)
NEUTROPHILS RELATIVE PERCENT: 89.4 % (ref 43–80)
NEUTROPHILS RELATIVE PERCENT: 91 % (ref 43–80)
NEUTROPHILS RELATIVE PERCENT: 94.8 % (ref 43–80)
NEUTROPHILS RELATIVE PERCENT: 96.5 % (ref 43–80)
NITRITE, URINE: NEGATIVE
NUCLEATED CELLS FLUID: 1632 /UL
NUCLEATED RED BLOOD CELLS: 1.8 /100 WBC
NUCLEATED RED BLOOD CELLS: 2.6 /100 WBC
O2 CONTENT: 11.3 ML/DL
O2 CONTENT: 12.2 ML/DL
O2 CONTENT: 12.9 ML/DL
O2 CONTENT: 13.8 ML/DL
O2 CONTENT: 14 ML/DL
O2 CONTENT: 14 ML/DL
O2 CONTENT: 14.5 ML/DL
O2 SATURATION: 77.1 % (ref 92–98.5)
O2 SATURATION: 94.6 % (ref 92–98.5)
O2 SATURATION: 97.1 % (ref 92–98.5)
O2 SATURATION: 97.3 % (ref 92–98.5)
O2 SATURATION: 97.3 % (ref 92–98.5)
O2 SATURATION: 98.6 % (ref 92–98.5)
O2 SATURATION: 99 % (ref 92–98.5)
O2 SATURATION: 99.1 % (ref 92–98.5)
O2HB: 76.4 % (ref 94–97)
O2HB: 93.7 % (ref 94–97)
O2HB: 94.9 % (ref 94–97)
O2HB: 96.3 % (ref 94–97)
O2HB: 96.6 % (ref 94–97)
O2HB: 96.8 % (ref 94–97)
O2HB: 97.7 % (ref 94–97)
O2HB: 98 % (ref 94–97)
OPERATOR ID: 1874
OPERATOR ID: 2860
OPERATOR ID: 359
OPERATOR ID: 366
OPERATOR ID: ABNORMAL
OPIATE SCREEN URINE: NOT DETECTED
ORGANISM: ABNORMAL
OVALOCYTES: ABNORMAL
OXYCODONE URINE: NOT DETECTED
PATIENT TEMP: 37 C
PCO2: 26.1 MMHG (ref 35–45)
PCO2: 33.6 MMHG (ref 35–45)
PCO2: 34.1 MMHG (ref 35–45)
PCO2: 35.2 MMHG (ref 35–45)
PCO2: 40.5 MMHG (ref 35–45)
PCO2: 46.8 MMHG (ref 35–45)
PCO2: 51.4 MMHG (ref 35–45)
PCO2: 75.6 MMHG (ref 35–45)
PDW BLD-RTO: 21.3 FL (ref 11.5–15)
PDW BLD-RTO: 21.4 FL (ref 11.5–15)
PDW BLD-RTO: 21.5 FL (ref 11.5–15)
PDW BLD-RTO: 21.7 FL (ref 11.5–15)
PDW BLD-RTO: 21.9 FL (ref 11.5–15)
PDW BLD-RTO: 22.1 FL (ref 11.5–15)
PDW BLD-RTO: 22.3 FL (ref 11.5–15)
PDW BLD-RTO: 22.3 FL (ref 11.5–15)
PDW BLD-RTO: 22.5 FL (ref 11.5–15)
PEEP/CPAP: 10 CMH2O
PEEP/CPAP: 10 CMH2O
PEEP/CPAP: 5 CMH2O
PFO2: 0.8 MMHG/%
PFO2: 2.05 MMHG/%
PFO2: 2.52 MMHG/%
PFO2: 2.59 MMHG/%
PFO2: 3.34 MMHG/%
PFO2: 4.02 MMHG/%
PH BLOOD GAS: 7.2 (ref 7.35–7.45)
PH BLOOD GAS: 7.33 (ref 7.35–7.45)
PH BLOOD GAS: 7.39 (ref 7.35–7.45)
PH BLOOD GAS: 7.46 (ref 7.35–7.45)
PH BLOOD GAS: 7.5 (ref 7.35–7.45)
PH BLOOD GAS: 7.53 (ref 7.35–7.45)
PH BLOOD GAS: 7.53 (ref 7.35–7.45)
PH BLOOD GAS: 7.64 (ref 7.35–7.45)
PH UA: 6.5 (ref 5–9)
PHENCYCLIDINE SCREEN URINE: NOT DETECTED
PHOSPHORUS: 3.4 MG/DL (ref 2.5–4.5)
PHOSPHORUS: 4.1 MG/DL (ref 2.5–4.5)
PLATELET # BLD: 152 E9/L (ref 130–450)
PLATELET # BLD: 156 E9/L (ref 130–450)
PLATELET # BLD: 162 E9/L (ref 130–450)
PLATELET # BLD: 174 E9/L (ref 130–450)
PLATELET # BLD: 178 E9/L (ref 130–450)
PLATELET # BLD: 190 E9/L (ref 130–450)
PLATELET # BLD: 191 E9/L (ref 130–450)
PLATELET # BLD: 195 E9/L (ref 130–450)
PLATELET # BLD: 196 E9/L (ref 130–450)
PMV BLD AUTO: 10 FL (ref 7–12)
PMV BLD AUTO: 10.2 FL (ref 7–12)
PMV BLD AUTO: 10.4 FL (ref 7–12)
PMV BLD AUTO: 10.6 FL (ref 7–12)
PMV BLD AUTO: 11 FL (ref 7–12)
PMV BLD AUTO: 11.3 FL (ref 7–12)
PMV BLD AUTO: 9.8 FL (ref 7–12)
PO2: 100.9 MMHG (ref 75–100)
PO2: 103.7 MMHG (ref 75–100)
PO2: 133.5 MMHG (ref 75–100)
PO2: 205.2 MMHG (ref 75–100)
PO2: 241 MMHG (ref 75–100)
PO2: 56.3 MMHG (ref 75–100)
PO2: 79.8 MMHG (ref 75–100)
PO2: 94 MMHG (ref 75–100)
POIKILOCYTES: ABNORMAL
POLYCHROMASIA: ABNORMAL
POTASSIUM SERPL-SCNC: 3.7 MMOL/L (ref 3.5–5)
POTASSIUM SERPL-SCNC: 3.8 MMOL/L (ref 3.5–5)
POTASSIUM SERPL-SCNC: 3.9 MMOL/L (ref 3.5–5)
POTASSIUM SERPL-SCNC: 4 MMOL/L (ref 3.5–5)
POTASSIUM SERPL-SCNC: 4 MMOL/L (ref 3.5–5)
POTASSIUM SERPL-SCNC: 4.1 MMOL/L (ref 3.5–5)
POTASSIUM SERPL-SCNC: 4.1 MMOL/L (ref 3.5–5)
POTASSIUM SERPL-SCNC: 4.2 MMOL/L (ref 3.5–5)
POTASSIUM SERPL-SCNC: 4.4 MMOL/L (ref 3.5–5)
POTASSIUM SERPL-SCNC: 4.7 MMOL/L (ref 3.5–5)
POTASSIUM SERPL-SCNC: 5 MMOL/L (ref 3.5–5)
POTASSIUM SERPL-SCNC: 5.81 MMOL/L (ref 3.5–5)
POTASSIUM, UR: 31.1 MMOL/L
PRO-BNP: ABNORMAL PG/ML (ref 0–450)
PRO-BNP: ABNORMAL PG/ML (ref 0–450)
PROCALCITONIN: 0.08 NG/ML (ref 0–0.08)
PROTEIN FLUID: 3 G/DL
PROTEIN UA: 100 MG/DL
PROTHROMBIN TIME: 17.4 SEC (ref 9.3–12.4)
RBC # BLD: 3.12 E12/L (ref 3.8–5.8)
RBC # BLD: 3.15 E12/L (ref 3.8–5.8)
RBC # BLD: 3.16 E12/L (ref 3.8–5.8)
RBC # BLD: 3.17 E12/L (ref 3.8–5.8)
RBC # BLD: 3.39 E12/L (ref 3.8–5.8)
RBC # BLD: 3.4 E12/L (ref 3.8–5.8)
RBC # BLD: 3.48 E12/L (ref 3.8–5.8)
RBC # BLD: 3.5 E12/L (ref 3.8–5.8)
RBC # BLD: 3.81 E12/L (ref 3.8–5.8)
RBC FLUID: NORMAL /UL
RBC UA: ABNORMAL /HPF (ref 0–2)
RENAL EPITHELIAL, UA: ABNORMAL /HPF
REPORT: NORMAL
RI(T): 0.59
RI(T): 0.76
RI(T): 1.28
RI(T): 1.34
RI(T): 2.1
RI(T): 7.11
RR MECHANICAL: 14 B/MIN
RR MECHANICAL: 16 B/MIN
RR MECHANICAL: 20 B/MIN
RR MECHANICAL: 22 B/MIN
SCHISTOCYTES: ABNORMAL
SMEAR, RESPIRATORY: ABNORMAL
SODIUM BLD-SCNC: 139 MMOL/L (ref 132–146)
SODIUM BLD-SCNC: 140 MMOL/L (ref 132–146)
SODIUM BLD-SCNC: 141 MMOL/L (ref 132–146)
SODIUM BLD-SCNC: 142 MMOL/L (ref 132–146)
SODIUM BLD-SCNC: 142 MMOL/L (ref 132–146)
SODIUM BLD-SCNC: 143 MMOL/L (ref 132–146)
SODIUM BLD-SCNC: 144 MMOL/L (ref 132–146)
SODIUM BLD-SCNC: 145 MMOL/L (ref 132–146)
SODIUM BLD-SCNC: 146 MMOL/L (ref 132–146)
SODIUM BLD-SCNC: 147 MMOL/L (ref 132–146)
SODIUM URINE: 85 MMOL/L
SOURCE, BLOOD GAS: ABNORMAL
SPECIFIC GRAVITY UA: 1.01 (ref 1–1.03)
T3 FREE: 1.2 PG/ML (ref 2–4.4)
T4 FREE: 1.01 NG/DL (ref 0.93–1.7)
TARGET CELLS: ABNORMAL
TEAR DROP CELLS: ABNORMAL
TEAR DROP CELLS: ABNORMAL
THB: 10.1 G/DL (ref 11.5–16.5)
THB: 10.5 G/DL (ref 11.5–16.5)
THB: 10.5 G/DL (ref 11.5–16.5)
THB: 9.2 G/DL (ref 11.5–16.5)
THB: 9.4 G/DL (ref 11.5–16.5)
THB: 9.7 G/DL (ref 11.5–16.5)
THB: 9.7 G/DL (ref 11.5–16.5)
THB: 9.8 G/DL (ref 11.5–16.5)
THIS TEST SENT TO: NORMAL
TIME ANALYZED: 1121
TIME ANALYZED: 1250
TIME ANALYZED: 1920
TIME ANALYZED: 207
TIME ANALYZED: 351
TIME ANALYZED: 406
TIME ANALYZED: 519
TIME ANALYZED: 539
TOTAL CK: 66 U/L (ref 20–200)
TOTAL IRON BINDING CAPACITY: 231 MCG/DL (ref 250–450)
TOTAL PROTEIN: 5.7 G/DL (ref 6.4–8.3)
TOTAL PROTEIN: 5.7 G/DL (ref 6.4–8.3)
TOTAL PROTEIN: 5.8 G/DL (ref 6.4–8.3)
TOTAL PROTEIN: 6 G/DL (ref 6.4–8.3)
TOTAL PROTEIN: 6 G/DL (ref 6.4–8.3)
TOTAL PROTEIN: 6.1 G/DL (ref 6.4–8.3)
TOTAL PROTEIN: 6.2 G/DL (ref 6.4–8.3)
TOTAL PROTEIN: 6.6 G/DL (ref 6.4–8.3)
TRIGLYCERIDES FLUID: 26 MG/DL
TROPONIN: 0.08 NG/ML (ref 0–0.03)
TROPONIN: 0.09 NG/ML (ref 0–0.03)
TROPONIN: 0.15 NG/ML (ref 0–0.03)
TROPONIN: 0.16 NG/ML (ref 0–0.03)
TROPONIN: 0.17 NG/ML (ref 0–0.03)
TROPONIN: 0.19 NG/ML (ref 0–0.03)
TROPONIN: 0.19 NG/ML (ref 0–0.03)
TSH SERPL DL<=0.05 MIU/L-ACNC: 12.36 UIU/ML (ref 0.27–4.2)
UREA NITROGEN, UR: 444 MG/DL (ref 800–1666)
URINE CULTURE, ROUTINE: NORMAL
UROBILINOGEN, URINE: 1 E.U./DL
VT MECHANICAL: 100 ML
VT MECHANICAL: 350 ML
VT MECHANICAL: 380 ML
VT MECHANICAL: 450 ML
WBC # BLD: 6 E9/L (ref 4.5–11.5)
WBC # BLD: 6.1 E9/L (ref 4.5–11.5)
WBC # BLD: 6.4 E9/L (ref 4.5–11.5)
WBC # BLD: 7.3 E9/L (ref 4.5–11.5)
WBC # BLD: 7.5 E9/L (ref 4.5–11.5)
WBC # BLD: 7.9 E9/L (ref 4.5–11.5)
WBC # BLD: 8 E9/L (ref 4.5–11.5)
WBC # BLD: 9 E9/L (ref 4.5–11.5)
WBC # BLD: 9.4 E9/L (ref 4.5–11.5)
WBC UA: ABNORMAL /HPF (ref 0–5)

## 2020-01-01 PROCEDURE — 6370000000 HC RX 637 (ALT 250 FOR IP): Performed by: INTERNAL MEDICINE

## 2020-01-01 PROCEDURE — 2500000003 HC RX 250 WO HCPCS

## 2020-01-01 PROCEDURE — 97530 THERAPEUTIC ACTIVITIES: CPT

## 2020-01-01 PROCEDURE — 2060000000 HC ICU INTERMEDIATE R&B

## 2020-01-01 PROCEDURE — 6360000002 HC RX W HCPCS

## 2020-01-01 PROCEDURE — 84439 ASSAY OF FREE THYROXINE: CPT

## 2020-01-01 PROCEDURE — 97168 OT RE-EVAL EST PLAN CARE: CPT

## 2020-01-01 PROCEDURE — 87081 CULTURE SCREEN ONLY: CPT

## 2020-01-01 PROCEDURE — 85025 COMPLETE CBC W/AUTO DIFF WBC: CPT

## 2020-01-01 PROCEDURE — 94660 CPAP INITIATION&MGMT: CPT

## 2020-01-01 PROCEDURE — 71045 X-RAY EXAM CHEST 1 VIEW: CPT

## 2020-01-01 PROCEDURE — 82805 BLOOD GASES W/O2 SATURATION: CPT

## 2020-01-01 PROCEDURE — 84478 ASSAY OF TRIGLYCERIDES: CPT

## 2020-01-01 PROCEDURE — 6360000002 HC RX W HCPCS: Performed by: INTERNAL MEDICINE

## 2020-01-01 PROCEDURE — 2500000003 HC RX 250 WO HCPCS: Performed by: INTERNAL MEDICINE

## 2020-01-01 PROCEDURE — 2000000000 HC ICU R&B

## 2020-01-01 PROCEDURE — 80048 BASIC METABOLIC PNL TOTAL CA: CPT

## 2020-01-01 PROCEDURE — 87070 CULTURE OTHR SPECIMN AEROBIC: CPT

## 2020-01-01 PROCEDURE — 94640 AIRWAY INHALATION TREATMENT: CPT

## 2020-01-01 PROCEDURE — 83605 ASSAY OF LACTIC ACID: CPT

## 2020-01-01 PROCEDURE — 0W9B3ZX DRAINAGE OF LEFT PLEURAL CAVITY, PERCUTANEOUS APPROACH, DIAGNOSTIC: ICD-10-PCS | Performed by: INTERNAL MEDICINE

## 2020-01-01 PROCEDURE — 2580000003 HC RX 258: Performed by: INTERNAL MEDICINE

## 2020-01-01 PROCEDURE — 97161 PT EVAL LOW COMPLEX 20 MIN: CPT

## 2020-01-01 PROCEDURE — 94002 VENT MGMT INPAT INIT DAY: CPT

## 2020-01-01 PROCEDURE — 80053 COMPREHEN METABOLIC PANEL: CPT

## 2020-01-01 PROCEDURE — 86334 IMMUNOFIX E-PHORESIS SERUM: CPT

## 2020-01-01 PROCEDURE — 99233 SBSQ HOSP IP/OBS HIGH 50: CPT | Performed by: FAMILY MEDICINE

## 2020-01-01 PROCEDURE — 2580000003 HC RX 258

## 2020-01-01 PROCEDURE — 36556 INSERT NON-TUNNEL CV CATH: CPT

## 2020-01-01 PROCEDURE — 96365 THER/PROPH/DIAG IV INF INIT: CPT

## 2020-01-01 PROCEDURE — 84165 PROTEIN E-PHORESIS SERUM: CPT

## 2020-01-01 PROCEDURE — 84300 ASSAY OF URINE SODIUM: CPT

## 2020-01-01 PROCEDURE — 83735 ASSAY OF MAGNESIUM: CPT

## 2020-01-01 PROCEDURE — 93005 ELECTROCARDIOGRAM TRACING: CPT | Performed by: INTERNAL MEDICINE

## 2020-01-01 PROCEDURE — 2700000000 HC OXYGEN THERAPY PER DAY

## 2020-01-01 PROCEDURE — 85610 PROTHROMBIN TIME: CPT

## 2020-01-01 PROCEDURE — 83550 IRON BINDING TEST: CPT

## 2020-01-01 PROCEDURE — 92950 HEART/LUNG RESUSCITATION CPR: CPT

## 2020-01-01 PROCEDURE — 89220 SPUTUM SPECIMEN COLLECTION: CPT

## 2020-01-01 PROCEDURE — 87088 URINE BACTERIA CULTURE: CPT

## 2020-01-01 PROCEDURE — 84540 ASSAY OF URINE/UREA-N: CPT

## 2020-01-01 PROCEDURE — 99213 OFFICE O/P EST LOW 20 MIN: CPT | Performed by: INTERNAL MEDICINE

## 2020-01-01 PROCEDURE — A9540 TC99M MAA: HCPCS | Performed by: RADIOLOGY

## 2020-01-01 PROCEDURE — 36415 COLL VENOUS BLD VENIPUNCTURE: CPT

## 2020-01-01 PROCEDURE — 5A1945Z RESPIRATORY VENTILATION, 24-96 CONSECUTIVE HOURS: ICD-10-PCS | Performed by: INTERNAL MEDICINE

## 2020-01-01 PROCEDURE — C9113 INJ PANTOPRAZOLE SODIUM, VIA: HCPCS | Performed by: INTERNAL MEDICINE

## 2020-01-01 PROCEDURE — 99232 SBSQ HOSP IP/OBS MODERATE 35: CPT | Performed by: NURSE PRACTITIONER

## 2020-01-01 PROCEDURE — 99221 1ST HOSP IP/OBS SF/LOW 40: CPT | Performed by: NURSE PRACTITIONER

## 2020-01-01 PROCEDURE — 85027 COMPLETE CBC AUTOMATED: CPT

## 2020-01-01 PROCEDURE — 97164 PT RE-EVAL EST PLAN CARE: CPT

## 2020-01-01 PROCEDURE — 36600 WITHDRAWAL OF ARTERIAL BLOOD: CPT

## 2020-01-01 PROCEDURE — 87186 SC STD MICRODIL/AGAR DIL: CPT

## 2020-01-01 PROCEDURE — 84132 ASSAY OF SERUM POTASSIUM: CPT

## 2020-01-01 PROCEDURE — 87206 SMEAR FLUORESCENT/ACID STAI: CPT

## 2020-01-01 PROCEDURE — 84157 ASSAY OF PROTEIN OTHER: CPT

## 2020-01-01 PROCEDURE — 82570 ASSAY OF URINE CREATININE: CPT

## 2020-01-01 PROCEDURE — 89051 BODY FLUID CELL COUNT: CPT

## 2020-01-01 PROCEDURE — 71046 X-RAY EXAM CHEST 2 VIEWS: CPT

## 2020-01-01 PROCEDURE — 82962 GLUCOSE BLOOD TEST: CPT

## 2020-01-01 PROCEDURE — 93010 ELECTROCARDIOGRAM REPORT: CPT | Performed by: INTERNAL MEDICINE

## 2020-01-01 PROCEDURE — 93005 ELECTROCARDIOGRAM TRACING: CPT | Performed by: EMERGENCY MEDICINE

## 2020-01-01 PROCEDURE — 83615 LACTATE (LD) (LDH) ENZYME: CPT

## 2020-01-01 PROCEDURE — 94664 DEMO&/EVAL PT USE INHALER: CPT

## 2020-01-01 PROCEDURE — 82553 CREATINE MB FRACTION: CPT

## 2020-01-01 PROCEDURE — 6370000000 HC RX 637 (ALT 250 FOR IP): Performed by: EMERGENCY MEDICINE

## 2020-01-01 PROCEDURE — 99291 CRITICAL CARE FIRST HOUR: CPT

## 2020-01-01 PROCEDURE — 97535 SELF CARE MNGMENT TRAINING: CPT

## 2020-01-01 PROCEDURE — 84484 ASSAY OF TROPONIN QUANT: CPT

## 2020-01-01 PROCEDURE — 84166 PROTEIN E-PHORESIS/URINE/CSF: CPT

## 2020-01-01 PROCEDURE — 85378 FIBRIN DEGRADE SEMIQUANT: CPT

## 2020-01-01 PROCEDURE — 85730 THROMBOPLASTIN TIME PARTIAL: CPT

## 2020-01-01 PROCEDURE — 02HV33Z INSERTION OF INFUSION DEVICE INTO SUPERIOR VENA CAVA, PERCUTANEOUS APPROACH: ICD-10-PCS | Performed by: INTERNAL MEDICINE

## 2020-01-01 PROCEDURE — 84481 FREE ASSAY (FT-3): CPT

## 2020-01-01 PROCEDURE — 84443 ASSAY THYROID STIM HORMONE: CPT

## 2020-01-01 PROCEDURE — 88112 CYTOPATH CELL ENHANCE TECH: CPT

## 2020-01-01 PROCEDURE — 94003 VENT MGMT INPAT SUBQ DAY: CPT

## 2020-01-01 PROCEDURE — 96366 THER/PROPH/DIAG IV INF ADDON: CPT

## 2020-01-01 PROCEDURE — 83986 ASSAY PH BODY FLUID NOS: CPT

## 2020-01-01 PROCEDURE — 97166 OT EVAL MOD COMPLEX 45 MIN: CPT

## 2020-01-01 PROCEDURE — 82550 ASSAY OF CK (CPK): CPT

## 2020-01-01 PROCEDURE — 78580 LUNG PERFUSION IMAGING: CPT

## 2020-01-01 PROCEDURE — 87077 CULTURE AEROBIC IDENTIFY: CPT

## 2020-01-01 PROCEDURE — 82436 ASSAY OF URINE CHLORIDE: CPT

## 2020-01-01 PROCEDURE — 74018 RADEX ABDOMEN 1 VIEW: CPT

## 2020-01-01 PROCEDURE — 87040 BLOOD CULTURE FOR BACTERIA: CPT

## 2020-01-01 PROCEDURE — 83880 ASSAY OF NATRIURETIC PEPTIDE: CPT

## 2020-01-01 PROCEDURE — 93970 EXTREMITY STUDY: CPT

## 2020-01-01 PROCEDURE — 3430000000 HC RX DIAGNOSTIC RADIOPHARMACEUTICAL: Performed by: RADIOLOGY

## 2020-01-01 PROCEDURE — 84133 ASSAY OF URINE POTASSIUM: CPT

## 2020-01-01 PROCEDURE — 84100 ASSAY OF PHOSPHORUS: CPT

## 2020-01-01 PROCEDURE — 93306 TTE W/DOPPLER COMPLETE: CPT

## 2020-01-01 PROCEDURE — 84145 PROCALCITONIN (PCT): CPT

## 2020-01-01 PROCEDURE — 6360000002 HC RX W HCPCS: Performed by: EMERGENCY MEDICINE

## 2020-01-01 PROCEDURE — 81001 URINALYSIS AUTO W/SCOPE: CPT

## 2020-01-01 PROCEDURE — 85013 SPUN MICROHEMATOCRIT: CPT

## 2020-01-01 PROCEDURE — 88305 TISSUE EXAM BY PATHOLOGIST: CPT

## 2020-01-01 PROCEDURE — 94760 N-INVAS EAR/PLS OXIMETRY 1: CPT

## 2020-01-01 PROCEDURE — 80307 DRUG TEST PRSMV CHEM ANLYZR: CPT

## 2020-01-01 PROCEDURE — 82150 ASSAY OF AMYLASE: CPT

## 2020-01-01 PROCEDURE — 93000 ELECTROCARDIOGRAM COMPLETE: CPT | Performed by: INTERNAL MEDICINE

## 2020-01-01 PROCEDURE — P9047 ALBUMIN (HUMAN), 25%, 50ML: HCPCS | Performed by: INTERNAL MEDICINE

## 2020-01-01 PROCEDURE — 83540 ASSAY OF IRON: CPT

## 2020-01-01 RX ORDER — DOPAMINE HYDROCHLORIDE 320 MG/100ML
INJECTION, SOLUTION INTRAVENOUS
Status: COMPLETED
Start: 2020-01-01 | End: 2020-01-01

## 2020-01-01 RX ORDER — POTASSIUM CHLORIDE 29.8 MG/ML
20 INJECTION INTRAVENOUS ONCE
Status: COMPLETED | OUTPATIENT
Start: 2020-01-01 | End: 2020-01-01

## 2020-01-01 RX ORDER — ALBUMIN (HUMAN) 12.5 G/50ML
25 SOLUTION INTRAVENOUS ONCE
Status: COMPLETED | OUTPATIENT
Start: 2020-01-01 | End: 2020-01-01

## 2020-01-01 RX ORDER — PETROLATUM 42 G/100G
OINTMENT TOPICAL 2 TIMES DAILY
Status: DISCONTINUED | OUTPATIENT
Start: 2020-01-01 | End: 2020-07-09 | Stop reason: HOSPADM

## 2020-01-01 RX ORDER — PETROLATUM 42 G/100G
OINTMENT TOPICAL 3 TIMES DAILY PRN
Status: DISCONTINUED | OUTPATIENT
Start: 2020-01-01 | End: 2020-07-09 | Stop reason: HOSPADM

## 2020-01-01 RX ORDER — HYDRALAZINE HYDROCHLORIDE 10 MG/1
10 TABLET, FILM COATED ORAL EVERY 8 HOURS SCHEDULED
Status: DISCONTINUED | OUTPATIENT
Start: 2020-01-01 | End: 2020-01-01

## 2020-01-01 RX ORDER — DOPAMINE HYDROCHLORIDE 320 MG/100ML
5 INJECTION, SOLUTION INTRAVENOUS CONTINUOUS
Status: DISCONTINUED | OUTPATIENT
Start: 2020-01-01 | End: 2020-01-01

## 2020-01-01 RX ORDER — SODIUM CHLORIDE 0.9 % (FLUSH) 0.9 %
10 SYRINGE (ML) INJECTION EVERY 12 HOURS SCHEDULED
Status: DISCONTINUED | OUTPATIENT
Start: 2020-01-01 | End: 2020-07-09 | Stop reason: HOSPADM

## 2020-01-01 RX ORDER — POTASSIUM CHLORIDE 29.8 MG/ML
40 INJECTION INTRAVENOUS ONCE
Status: COMPLETED | OUTPATIENT
Start: 2020-01-01 | End: 2020-01-01

## 2020-01-01 RX ORDER — PANTOPRAZOLE SODIUM 40 MG/1
TABLET, DELAYED RELEASE ORAL
COMMUNITY
Start: 2020-01-01

## 2020-01-01 RX ORDER — MIDAZOLAM HYDROCHLORIDE 1 MG/ML
INJECTION INTRAMUSCULAR; INTRAVENOUS
Status: COMPLETED
Start: 2020-01-01 | End: 2020-01-01

## 2020-01-01 RX ORDER — ISOSORBIDE DINITRATE 10 MG/1
10 TABLET ORAL 3 TIMES DAILY
Status: DISCONTINUED | OUTPATIENT
Start: 2020-01-01 | End: 2020-07-09 | Stop reason: HOSPADM

## 2020-01-01 RX ORDER — POTASSIUM CHLORIDE 750 MG/1
10 TABLET, EXTENDED RELEASE ORAL 2 TIMES DAILY WITH MEALS
Status: DISCONTINUED | OUTPATIENT
Start: 2020-01-01 | End: 2020-01-01

## 2020-01-01 RX ORDER — HEPARIN SODIUM 10000 [USP'U]/ML
5000 INJECTION, SOLUTION INTRAVENOUS; SUBCUTANEOUS EVERY 8 HOURS SCHEDULED
Status: DISCONTINUED | OUTPATIENT
Start: 2020-01-01 | End: 2020-01-01

## 2020-01-01 RX ORDER — SODIUM CHLORIDE 0.9 % (FLUSH) 0.9 %
10 SYRINGE (ML) INJECTION PRN
Status: DISCONTINUED | OUTPATIENT
Start: 2020-01-01 | End: 2020-07-09 | Stop reason: HOSPADM

## 2020-01-01 RX ORDER — IPRATROPIUM BROMIDE AND ALBUTEROL SULFATE 2.5; .5 MG/3ML; MG/3ML
3 SOLUTION RESPIRATORY (INHALATION) EVERY 4 HOURS
Status: DISCONTINUED | OUTPATIENT
Start: 2020-01-01 | End: 2020-07-09 | Stop reason: HOSPADM

## 2020-01-01 RX ORDER — SPIRONOLACTONE 25 MG/1
25 TABLET ORAL
Status: DISCONTINUED | OUTPATIENT
Start: 2020-01-01 | End: 2020-07-09 | Stop reason: HOSPADM

## 2020-01-01 RX ORDER — ASPIRIN 325 MG
325 TABLET ORAL ONCE
Status: COMPLETED | OUTPATIENT
Start: 2020-01-01 | End: 2020-01-01

## 2020-01-01 RX ORDER — AMLODIPINE BESYLATE 2.5 MG/1
2.5 TABLET ORAL DAILY
Status: DISCONTINUED | OUTPATIENT
Start: 2020-01-01 | End: 2020-01-01

## 2020-01-01 RX ORDER — LEVOTHYROXINE SODIUM 112 UG/1
112 TABLET ORAL DAILY
Status: DISCONTINUED | OUTPATIENT
Start: 2020-01-01 | End: 2020-07-09 | Stop reason: HOSPADM

## 2020-01-01 RX ORDER — FUROSEMIDE 20 MG/1
TABLET ORAL
COMMUNITY
Start: 2020-01-01

## 2020-01-01 RX ORDER — AMLODIPINE BESYLATE 5 MG/1
5 TABLET ORAL DAILY
Status: DISCONTINUED | OUTPATIENT
Start: 2020-01-01 | End: 2020-01-01

## 2020-01-01 RX ORDER — FUROSEMIDE 10 MG/ML
80 INJECTION INTRAMUSCULAR; INTRAVENOUS ONCE
Status: COMPLETED | OUTPATIENT
Start: 2020-01-01 | End: 2020-01-01

## 2020-01-01 RX ORDER — FUROSEMIDE 10 MG/ML
40 INJECTION INTRAMUSCULAR; INTRAVENOUS DAILY
Status: DISCONTINUED | OUTPATIENT
Start: 2020-01-01 | End: 2020-01-01

## 2020-01-01 RX ORDER — SODIUM CHLORIDE 9 MG/ML
10 INJECTION INTRAVENOUS DAILY
Status: DISCONTINUED | OUTPATIENT
Start: 2020-01-01 | End: 2020-07-09 | Stop reason: HOSPADM

## 2020-01-01 RX ORDER — MIDODRINE HYDROCHLORIDE 5 MG/1
5 TABLET ORAL
Status: DISCONTINUED | OUTPATIENT
Start: 2020-01-01 | End: 2020-07-09 | Stop reason: HOSPADM

## 2020-01-01 RX ORDER — DEXTROSE MONOHYDRATE 50 MG/ML
INJECTION, SOLUTION INTRAVENOUS CONTINUOUS
Status: DISCONTINUED | OUTPATIENT
Start: 2020-01-01 | End: 2020-01-01

## 2020-01-01 RX ORDER — HEPARIN SODIUM 1000 [USP'U]/ML
60 INJECTION, SOLUTION INTRAVENOUS; SUBCUTANEOUS PRN
Status: DISCONTINUED | OUTPATIENT
Start: 2020-01-01 | End: 2020-01-01 | Stop reason: ALTCHOICE

## 2020-01-01 RX ORDER — ACETAMINOPHEN 500 MG
500 TABLET ORAL EVERY 6 HOURS PRN
Status: DISCONTINUED | OUTPATIENT
Start: 2020-01-01 | End: 2020-01-01 | Stop reason: SDUPTHER

## 2020-01-01 RX ORDER — HEPARIN SODIUM 1000 [USP'U]/ML
30 INJECTION, SOLUTION INTRAVENOUS; SUBCUTANEOUS PRN
Status: DISCONTINUED | OUTPATIENT
Start: 2020-01-01 | End: 2020-01-01 | Stop reason: ALTCHOICE

## 2020-01-01 RX ORDER — CLONIDINE HYDROCHLORIDE 0.1 MG/1
0.1 TABLET ORAL 2 TIMES DAILY
Status: DISCONTINUED | OUTPATIENT
Start: 2020-01-01 | End: 2020-01-01

## 2020-01-01 RX ORDER — ACETAMINOPHEN 325 MG/1
650 TABLET ORAL EVERY 6 HOURS PRN
Status: DISCONTINUED | OUTPATIENT
Start: 2020-01-01 | End: 2020-07-09 | Stop reason: HOSPADM

## 2020-01-01 RX ORDER — HEPARIN SODIUM 1000 [USP'U]/ML
60 INJECTION, SOLUTION INTRAVENOUS; SUBCUTANEOUS ONCE
Status: COMPLETED | OUTPATIENT
Start: 2020-01-01 | End: 2020-01-01

## 2020-01-01 RX ORDER — PROMETHAZINE HYDROCHLORIDE 25 MG/1
12.5 TABLET ORAL EVERY 6 HOURS PRN
Status: DISCONTINUED | OUTPATIENT
Start: 2020-01-01 | End: 2020-07-09 | Stop reason: HOSPADM

## 2020-01-01 RX ORDER — FERROUS SULFATE 325(65) MG
325 TABLET ORAL 2 TIMES DAILY WITH MEALS
Status: DISCONTINUED | OUTPATIENT
Start: 2020-01-01 | End: 2020-07-09 | Stop reason: HOSPADM

## 2020-01-01 RX ORDER — SODIUM CHLORIDE 0.9 % (FLUSH) 0.9 %
10 SYRINGE (ML) INJECTION EVERY 12 HOURS SCHEDULED
Status: DISCONTINUED | OUTPATIENT
Start: 2020-01-01 | End: 2020-01-01 | Stop reason: SDUPTHER

## 2020-01-01 RX ORDER — CARVEDILOL 3.12 MG/1
1.56 TABLET ORAL 2 TIMES DAILY WITH MEALS
Status: CANCELLED | OUTPATIENT
Start: 2020-01-01

## 2020-01-01 RX ORDER — MIDAZOLAM HYDROCHLORIDE 1 MG/ML
2 INJECTION INTRAMUSCULAR; INTRAVENOUS EVERY 4 HOURS PRN
Status: DISCONTINUED | OUTPATIENT
Start: 2020-01-01 | End: 2020-07-09 | Stop reason: HOSPADM

## 2020-01-01 RX ORDER — ASPIRIN 81 MG/1
81 TABLET, CHEWABLE ORAL DAILY
Status: DISCONTINUED | OUTPATIENT
Start: 2020-01-01 | End: 2020-07-09 | Stop reason: HOSPADM

## 2020-01-01 RX ORDER — SODIUM CHLORIDE 0.9 % (FLUSH) 0.9 %
10 SYRINGE (ML) INJECTION PRN
Status: DISCONTINUED | OUTPATIENT
Start: 2020-01-01 | End: 2020-01-01 | Stop reason: SDUPTHER

## 2020-01-01 RX ORDER — HEPARIN SODIUM 10000 [USP'U]/100ML
12 INJECTION, SOLUTION INTRAVENOUS CONTINUOUS
Status: DISCONTINUED | OUTPATIENT
Start: 2020-01-01 | End: 2020-01-01

## 2020-01-01 RX ORDER — POLYETHYLENE GLYCOL 3350 17 G/17G
17 POWDER, FOR SOLUTION ORAL DAILY PRN
Status: DISCONTINUED | OUTPATIENT
Start: 2020-01-01 | End: 2020-07-09 | Stop reason: HOSPADM

## 2020-01-01 RX ORDER — ISOSORBIDE DINITRATE 10 MG/1
5 TABLET ORAL 3 TIMES DAILY
Status: DISCONTINUED | OUTPATIENT
Start: 2020-01-01 | End: 2020-01-01

## 2020-01-01 RX ORDER — PANTOPRAZOLE SODIUM 40 MG/10ML
40 INJECTION, POWDER, LYOPHILIZED, FOR SOLUTION INTRAVENOUS DAILY
Status: DISCONTINUED | OUTPATIENT
Start: 2020-01-01 | End: 2020-07-09 | Stop reason: HOSPADM

## 2020-01-01 RX ORDER — PETROLATUM 42 G/100G
OINTMENT TOPICAL 2 TIMES DAILY PRN
Status: DISCONTINUED | OUTPATIENT
Start: 2020-01-01 | End: 2020-01-01

## 2020-01-01 RX ORDER — PROPOFOL 10 MG/ML
10 INJECTION, EMULSION INTRAVENOUS
Status: DISCONTINUED | OUTPATIENT
Start: 2020-01-01 | End: 2020-01-01

## 2020-01-01 RX ORDER — 0.9 % SODIUM CHLORIDE 0.9 %
500 INTRAVENOUS SOLUTION INTRAVENOUS ONCE
Status: COMPLETED | OUTPATIENT
Start: 2020-01-01 | End: 2020-01-01

## 2020-01-01 RX ORDER — CARVEDILOL 6.25 MG/1
6.25 TABLET ORAL 2 TIMES DAILY WITH MEALS
Status: DISCONTINUED | OUTPATIENT
Start: 2020-01-01 | End: 2020-01-01

## 2020-01-01 RX ORDER — ONDANSETRON 2 MG/ML
4 INJECTION INTRAMUSCULAR; INTRAVENOUS EVERY 6 HOURS PRN
Status: DISCONTINUED | OUTPATIENT
Start: 2020-01-01 | End: 2020-07-09 | Stop reason: HOSPADM

## 2020-01-01 RX ORDER — CARVEDILOL 3.12 MG/1
1.56 TABLET ORAL 2 TIMES DAILY WITH MEALS
Status: DISCONTINUED | OUTPATIENT
Start: 2020-01-01 | End: 2020-07-09 | Stop reason: HOSPADM

## 2020-01-01 RX ORDER — ACETAMINOPHEN 650 MG/1
650 SUPPOSITORY RECTAL EVERY 6 HOURS PRN
Status: DISCONTINUED | OUTPATIENT
Start: 2020-01-01 | End: 2020-07-09 | Stop reason: HOSPADM

## 2020-01-01 RX ORDER — PANTOPRAZOLE SODIUM 40 MG/1
40 TABLET, DELAYED RELEASE ORAL
Status: DISCONTINUED | OUTPATIENT
Start: 2020-01-01 | End: 2020-01-01

## 2020-01-01 RX ADMIN — CLONIDINE HYDROCHLORIDE 0.1 MG: 0.1 TABLET ORAL at 10:30

## 2020-01-01 RX ADMIN — Medication 8 MILLICURIE: at 10:28

## 2020-01-01 RX ADMIN — PANTOPRAZOLE SODIUM 40 MG: 40 TABLET, DELAYED RELEASE ORAL at 05:30

## 2020-01-01 RX ADMIN — IPRATROPIUM BROMIDE AND ALBUTEROL SULFATE 3 ML: 2.5; .5 SOLUTION RESPIRATORY (INHALATION) at 11:50

## 2020-01-01 RX ADMIN — PETROLATUM: 42 OINTMENT TOPICAL at 22:14

## 2020-01-01 RX ADMIN — IPRATROPIUM BROMIDE AND ALBUTEROL SULFATE 3 ML: 2.5; .5 SOLUTION RESPIRATORY (INHALATION) at 17:01

## 2020-01-01 RX ADMIN — FERROUS SULFATE TAB 325 MG (65 MG ELEMENTAL FE) 325 MG: 325 (65 FE) TAB at 11:58

## 2020-01-01 RX ADMIN — POTASSIUM CHLORIDE 20 MEQ: 400 INJECTION, SOLUTION INTRAVENOUS at 06:38

## 2020-01-01 RX ADMIN — SODIUM CHLORIDE: 9 INJECTION, SOLUTION INTRAVENOUS at 15:23

## 2020-01-01 RX ADMIN — ISOSORBIDE DINITRATE 10 MG: 10 TABLET ORAL at 20:37

## 2020-01-01 RX ADMIN — CLONIDINE HYDROCHLORIDE 0.1 MG: 0.1 TABLET ORAL at 21:19

## 2020-01-01 RX ADMIN — IPRATROPIUM BROMIDE AND ALBUTEROL SULFATE 3 ML: 2.5; .5 SOLUTION RESPIRATORY (INHALATION) at 00:09

## 2020-01-01 RX ADMIN — APIXABAN 2.5 MG: 2.5 TABLET, FILM COATED ORAL at 13:00

## 2020-01-01 RX ADMIN — SODIUM CHLORIDE, PRESERVATIVE FREE 10 ML: 5 INJECTION INTRAVENOUS at 08:38

## 2020-01-01 RX ADMIN — PANTOPRAZOLE SODIUM 40 MG: 40 INJECTION, POWDER, FOR SOLUTION INTRAVENOUS at 08:39

## 2020-01-01 RX ADMIN — Medication 75 MCG/HR: at 19:16

## 2020-01-01 RX ADMIN — ALBUMIN (HUMAN) 25 G: 0.25 INJECTION, SOLUTION INTRAVENOUS at 14:00

## 2020-01-01 RX ADMIN — CARVEDILOL 1.56 MG: 3.12 TABLET, FILM COATED ORAL at 08:38

## 2020-01-01 RX ADMIN — IPRATROPIUM BROMIDE AND ALBUTEROL SULFATE 3 ML: 2.5; .5 SOLUTION RESPIRATORY (INHALATION) at 04:03

## 2020-01-01 RX ADMIN — HYDRALAZINE HYDROCHLORIDE 35 MG: 25 TABLET, FILM COATED ORAL at 05:59

## 2020-01-01 RX ADMIN — AMLODIPINE BESYLATE 5 MG: 5 TABLET ORAL at 10:10

## 2020-01-01 RX ADMIN — IPRATROPIUM BROMIDE AND ALBUTEROL SULFATE 3 ML: 2.5; .5 SOLUTION RESPIRATORY (INHALATION) at 13:10

## 2020-01-01 RX ADMIN — IPRATROPIUM BROMIDE AND ALBUTEROL SULFATE 3 ML: 2.5; .5 SOLUTION RESPIRATORY (INHALATION) at 18:57

## 2020-01-01 RX ADMIN — FERROUS SULFATE TAB 325 MG (65 MG ELEMENTAL FE) 325 MG: 325 (65 FE) TAB at 17:04

## 2020-01-01 RX ADMIN — PETROLATUM: 42 OINTMENT TOPICAL at 23:51

## 2020-01-01 RX ADMIN — LEVOTHYROXINE SODIUM 112 MCG: 0.11 TABLET ORAL at 05:29

## 2020-01-01 RX ADMIN — FERROUS SULFATE TAB 325 MG (65 MG ELEMENTAL FE) 325 MG: 325 (65 FE) TAB at 08:38

## 2020-01-01 RX ADMIN — SODIUM CHLORIDE 5 MG/HR: 9 INJECTION, SOLUTION INTRAVENOUS at 16:28

## 2020-01-01 RX ADMIN — POTASSIUM CHLORIDE 40 MEQ: 400 INJECTION, SOLUTION INTRAVENOUS at 09:26

## 2020-01-01 RX ADMIN — DOPAMINE HYDROCHLORIDE IN DEXTROSE 15 MCG/KG/MIN: 3.2 INJECTION, SOLUTION INTRAVENOUS at 10:21

## 2020-01-01 RX ADMIN — ISOSORBIDE DINITRATE 5 MG: 10 TABLET ORAL at 20:28

## 2020-01-01 RX ADMIN — IPRATROPIUM BROMIDE AND ALBUTEROL SULFATE 3 ML: 2.5; .5 SOLUTION RESPIRATORY (INHALATION) at 13:43

## 2020-01-01 RX ADMIN — POTASSIUM CHLORIDE 10 MEQ: 750 TABLET, EXTENDED RELEASE ORAL at 09:47

## 2020-01-01 RX ADMIN — SODIUM CHLORIDE 500 ML: 9 INJECTION, SOLUTION INTRAVENOUS at 14:04

## 2020-01-01 RX ADMIN — SODIUM CHLORIDE, PRESERVATIVE FREE 10 ML: 5 INJECTION INTRAVENOUS at 08:40

## 2020-01-01 RX ADMIN — HYDRALAZINE HYDROCHLORIDE 10 MG: 10 TABLET, FILM COATED ORAL at 14:05

## 2020-01-01 RX ADMIN — CARVEDILOL 6.25 MG: 6.25 TABLET, FILM COATED ORAL at 10:31

## 2020-01-01 RX ADMIN — ISOSORBIDE DINITRATE 10 MG: 10 TABLET ORAL at 08:39

## 2020-01-01 RX ADMIN — ISOSORBIDE DINITRATE 10 MG: 10 TABLET ORAL at 08:40

## 2020-01-01 RX ADMIN — PIPERACILLIN AND TAZOBACTAM 3.38 G: 3; .375 INJECTION, POWDER, FOR SOLUTION INTRAVENOUS at 02:30

## 2020-01-01 RX ADMIN — IPRATROPIUM BROMIDE AND ALBUTEROL SULFATE 3 ML: 2.5; .5 SOLUTION RESPIRATORY (INHALATION) at 08:30

## 2020-01-01 RX ADMIN — SODIUM CHLORIDE, PRESERVATIVE FREE 10 ML: 5 INJECTION INTRAVENOUS at 08:17

## 2020-01-01 RX ADMIN — DOPAMINE HYDROCHLORIDE IN DEXTROSE 5 MCG/KG/MIN: 3.2 INJECTION, SOLUTION INTRAVENOUS at 10:00

## 2020-01-01 RX ADMIN — HEPARIN SODIUM 3950 UNITS: 1000 INJECTION, SOLUTION INTRAVENOUS; SUBCUTANEOUS at 17:23

## 2020-01-01 RX ADMIN — ASPIRIN 81 MG 81 MG: 81 TABLET ORAL at 10:10

## 2020-01-01 RX ADMIN — IPRATROPIUM BROMIDE AND ALBUTEROL SULFATE 3 ML: 2.5; .5 SOLUTION RESPIRATORY (INHALATION) at 07:48

## 2020-01-01 RX ADMIN — FUROSEMIDE 40 MG: 10 INJECTION, SOLUTION INTRAMUSCULAR; INTRAVENOUS at 10:30

## 2020-01-01 RX ADMIN — Medication 75 MCG/HR: at 06:06

## 2020-01-01 RX ADMIN — PROPOFOL INJECTABLE EMULSION 20 MCG/KG/MIN: 10 INJECTION, EMULSION INTRAVENOUS at 17:25

## 2020-01-01 RX ADMIN — CLONIDINE HYDROCHLORIDE 0.1 MG: 0.1 TABLET ORAL at 23:04

## 2020-01-01 RX ADMIN — IPRATROPIUM BROMIDE AND ALBUTEROL SULFATE 3 ML: 2.5; .5 SOLUTION RESPIRATORY (INHALATION) at 09:04

## 2020-01-01 RX ADMIN — APIXABAN 2.5 MG: 2.5 TABLET, FILM COATED ORAL at 08:38

## 2020-01-01 RX ADMIN — SODIUM CHLORIDE, PRESERVATIVE FREE 10 ML: 5 INJECTION INTRAVENOUS at 20:43

## 2020-01-01 RX ADMIN — IPRATROPIUM BROMIDE AND ALBUTEROL SULFATE 3 ML: 2.5; .5 SOLUTION RESPIRATORY (INHALATION) at 04:18

## 2020-01-01 RX ADMIN — IPRATROPIUM BROMIDE AND ALBUTEROL SULFATE 3 ML: 2.5; .5 SOLUTION RESPIRATORY (INHALATION) at 05:46

## 2020-01-01 RX ADMIN — APIXABAN 2.5 MG: 2.5 TABLET, FILM COATED ORAL at 20:25

## 2020-01-01 RX ADMIN — IPRATROPIUM BROMIDE AND ALBUTEROL SULFATE 3 ML: 2.5; .5 SOLUTION RESPIRATORY (INHALATION) at 08:56

## 2020-01-01 RX ADMIN — HYDRALAZINE HYDROCHLORIDE 10 MG: 10 TABLET, FILM COATED ORAL at 14:40

## 2020-01-01 RX ADMIN — ACETAMINOPHEN 500 MG: 500 TABLET ORAL at 15:04

## 2020-01-01 RX ADMIN — CARVEDILOL 6.25 MG: 6.25 TABLET, FILM COATED ORAL at 08:40

## 2020-01-01 RX ADMIN — SODIUM CHLORIDE, PRESERVATIVE FREE 10 ML: 5 INJECTION INTRAVENOUS at 09:47

## 2020-01-01 RX ADMIN — LEVOTHYROXINE SODIUM 112 MCG: 0.11 TABLET ORAL at 06:01

## 2020-01-01 RX ADMIN — CLONIDINE HYDROCHLORIDE 0.1 MG: 0.1 TABLET ORAL at 10:10

## 2020-01-01 RX ADMIN — ASPIRIN 81 MG 81 MG: 81 TABLET ORAL at 08:38

## 2020-01-01 RX ADMIN — APIXABAN 2.5 MG: 2.5 TABLET, FILM COATED ORAL at 20:16

## 2020-01-01 RX ADMIN — IPRATROPIUM BROMIDE AND ALBUTEROL SULFATE 3 ML: 2.5; .5 SOLUTION RESPIRATORY (INHALATION) at 19:40

## 2020-01-01 RX ADMIN — IPRATROPIUM BROMIDE AND ALBUTEROL SULFATE 3 ML: 2.5; .5 SOLUTION RESPIRATORY (INHALATION) at 15:27

## 2020-01-01 RX ADMIN — IPRATROPIUM BROMIDE AND ALBUTEROL SULFATE 3 ML: 2.5; .5 SOLUTION RESPIRATORY (INHALATION) at 01:25

## 2020-01-01 RX ADMIN — HEPARIN SODIUM 5000 UNITS: 10000 INJECTION INTRAVENOUS; SUBCUTANEOUS at 06:10

## 2020-01-01 RX ADMIN — ISOSORBIDE DINITRATE 10 MG: 10 TABLET ORAL at 15:10

## 2020-01-01 RX ADMIN — SODIUM CHLORIDE: 9 INJECTION, SOLUTION INTRAVENOUS at 22:34

## 2020-01-01 RX ADMIN — APIXABAN 2.5 MG: 2.5 TABLET, FILM COATED ORAL at 11:58

## 2020-01-01 RX ADMIN — ASPIRIN 325 MG: 325 TABLET, FILM COATED ORAL at 17:21

## 2020-01-01 RX ADMIN — SODIUM CHLORIDE, PRESERVATIVE FREE 10 ML: 5 INJECTION INTRAVENOUS at 09:23

## 2020-01-01 RX ADMIN — PETROLATUM: 42 OINTMENT TOPICAL at 09:02

## 2020-01-01 RX ADMIN — PANTOPRAZOLE SODIUM 40 MG: 40 INJECTION, POWDER, FOR SOLUTION INTRAVENOUS at 09:23

## 2020-01-01 RX ADMIN — PANTOPRAZOLE SODIUM 40 MG: 40 TABLET, DELAYED RELEASE ORAL at 06:23

## 2020-01-01 RX ADMIN — ASPIRIN 81 MG 81 MG: 81 TABLET ORAL at 08:40

## 2020-01-01 RX ADMIN — IPRATROPIUM BROMIDE AND ALBUTEROL SULFATE 3 ML: 2.5; .5 SOLUTION RESPIRATORY (INHALATION) at 08:40

## 2020-01-01 RX ADMIN — WATER 1 G: 1 INJECTION INTRAMUSCULAR; INTRAVENOUS; SUBCUTANEOUS at 11:19

## 2020-01-01 RX ADMIN — SODIUM CHLORIDE 2.5 MG/HR: 9 INJECTION, SOLUTION INTRAVENOUS at 20:11

## 2020-01-01 RX ADMIN — ISOSORBIDE DINITRATE 10 MG: 10 TABLET ORAL at 09:23

## 2020-01-01 RX ADMIN — Medication 10 MCG/MIN: at 10:05

## 2020-01-01 RX ADMIN — IPRATROPIUM BROMIDE AND ALBUTEROL SULFATE 3 ML: 2.5; .5 SOLUTION RESPIRATORY (INHALATION) at 11:41

## 2020-01-01 RX ADMIN — CARVEDILOL 1.56 MG: 3.12 TABLET, FILM COATED ORAL at 17:03

## 2020-01-01 RX ADMIN — AMLODIPINE BESYLATE 2.5 MG: 2.5 TABLET ORAL at 09:46

## 2020-01-01 RX ADMIN — IPRATROPIUM BROMIDE AND ALBUTEROL SULFATE 3 ML: 2.5; .5 SOLUTION RESPIRATORY (INHALATION) at 12:47

## 2020-01-01 RX ADMIN — POTASSIUM CHLORIDE 20 MEQ: 29.8 INJECTION, SOLUTION INTRAVENOUS at 07:20

## 2020-01-01 RX ADMIN — ISOSORBIDE DINITRATE 10 MG: 10 TABLET ORAL at 15:20

## 2020-01-01 RX ADMIN — LEVOTHYROXINE SODIUM 112 MCG: 0.11 TABLET ORAL at 06:27

## 2020-01-01 RX ADMIN — PROPOFOL INJECTABLE EMULSION 10 MCG/KG/MIN: 10 INJECTION, EMULSION INTRAVENOUS at 03:20

## 2020-01-01 RX ADMIN — IPRATROPIUM BROMIDE AND ALBUTEROL SULFATE 3 ML: 2.5; .5 SOLUTION RESPIRATORY (INHALATION) at 19:49

## 2020-01-01 RX ADMIN — SODIUM CHLORIDE, PRESERVATIVE FREE 10 ML: 5 INJECTION INTRAVENOUS at 10:52

## 2020-01-01 RX ADMIN — Medication 5 MCG/MIN: at 10:27

## 2020-01-01 RX ADMIN — POTASSIUM CHLORIDE 10 MEQ: 750 TABLET, EXTENDED RELEASE ORAL at 23:05

## 2020-01-01 RX ADMIN — PIPERACILLIN AND TAZOBACTAM 3.38 G: 3; .375 INJECTION, POWDER, FOR SOLUTION INTRAVENOUS at 01:41

## 2020-01-01 RX ADMIN — ISOSORBIDE DINITRATE 10 MG: 10 TABLET ORAL at 20:40

## 2020-01-01 RX ADMIN — CLONIDINE HYDROCHLORIDE 0.1 MG: 0.1 TABLET ORAL at 20:37

## 2020-01-01 RX ADMIN — WATER 1 G: 1 INJECTION INTRAMUSCULAR; INTRAVENOUS; SUBCUTANEOUS at 10:48

## 2020-01-01 RX ADMIN — PANTOPRAZOLE SODIUM 40 MG: 40 INJECTION, POWDER, FOR SOLUTION INTRAVENOUS at 08:38

## 2020-01-01 RX ADMIN — FUROSEMIDE 80 MG: 10 INJECTION, SOLUTION INTRAMUSCULAR; INTRAVENOUS at 10:40

## 2020-01-01 RX ADMIN — IPRATROPIUM BROMIDE AND ALBUTEROL SULFATE 3 ML: 2.5; .5 SOLUTION RESPIRATORY (INHALATION) at 16:23

## 2020-01-01 RX ADMIN — METOPROLOL TARTRATE 25 MG: 25 TABLET, FILM COATED ORAL at 23:05

## 2020-01-01 RX ADMIN — APIXABAN 2.5 MG: 2.5 TABLET, FILM COATED ORAL at 10:39

## 2020-01-01 RX ADMIN — CARVEDILOL 6.25 MG: 6.25 TABLET, FILM COATED ORAL at 08:00

## 2020-01-01 RX ADMIN — ISOSORBIDE DINITRATE 10 MG: 10 TABLET ORAL at 14:35

## 2020-01-01 RX ADMIN — PIPERACILLIN AND TAZOBACTAM 3.38 G: 3; .375 INJECTION, POWDER, FOR SOLUTION INTRAVENOUS at 17:44

## 2020-01-01 RX ADMIN — ASPIRIN 81 MG 81 MG: 81 TABLET ORAL at 10:18

## 2020-01-01 RX ADMIN — IPRATROPIUM BROMIDE AND ALBUTEROL SULFATE 3 ML: 2.5; .5 SOLUTION RESPIRATORY (INHALATION) at 11:59

## 2020-01-01 RX ADMIN — LEVOTHYROXINE SODIUM 112 MCG: 0.11 TABLET ORAL at 07:03

## 2020-01-01 RX ADMIN — MIDODRINE HYDROCHLORIDE 5 MG: 5 TABLET ORAL at 17:00

## 2020-01-01 RX ADMIN — IPRATROPIUM BROMIDE AND ALBUTEROL SULFATE 3 ML: 2.5; .5 SOLUTION RESPIRATORY (INHALATION) at 17:22

## 2020-01-01 RX ADMIN — HYDRALAZINE HYDROCHLORIDE 10 MG: 10 TABLET, FILM COATED ORAL at 05:30

## 2020-01-01 RX ADMIN — HYDRALAZINE HYDROCHLORIDE 10 MG: 10 TABLET, FILM COATED ORAL at 20:28

## 2020-01-01 RX ADMIN — ISOSORBIDE DINITRATE 5 MG: 10 TABLET ORAL at 14:40

## 2020-01-01 RX ADMIN — IPRATROPIUM BROMIDE AND ALBUTEROL SULFATE 3 ML: 2.5; .5 SOLUTION RESPIRATORY (INHALATION) at 11:58

## 2020-01-01 RX ADMIN — IPRATROPIUM BROMIDE AND ALBUTEROL SULFATE 3 ML: 2.5; .5 SOLUTION RESPIRATORY (INHALATION) at 16:27

## 2020-01-01 RX ADMIN — POTASSIUM CHLORIDE 10 MEQ: 750 TABLET, EXTENDED RELEASE ORAL at 10:10

## 2020-01-01 RX ADMIN — POTASSIUM CHLORIDE 20 MEQ: 400 INJECTION, SOLUTION INTRAVENOUS at 08:54

## 2020-01-01 RX ADMIN — SODIUM CHLORIDE 5 MG/HR: 9 INJECTION, SOLUTION INTRAVENOUS at 03:00

## 2020-01-01 RX ADMIN — SODIUM CHLORIDE, PRESERVATIVE FREE 10 ML: 5 INJECTION INTRAVENOUS at 08:42

## 2020-01-01 RX ADMIN — SODIUM CHLORIDE, PRESERVATIVE FREE 10 ML: 5 INJECTION INTRAVENOUS at 20:26

## 2020-01-01 RX ADMIN — PANTOPRAZOLE SODIUM 40 MG: 40 INJECTION, POWDER, FOR SOLUTION INTRAVENOUS at 08:41

## 2020-01-01 RX ADMIN — PIPERACILLIN AND TAZOBACTAM 3.38 G: 3; .375 INJECTION, POWDER, FOR SOLUTION INTRAVENOUS at 17:31

## 2020-01-01 RX ADMIN — PETROLATUM: 42 OINTMENT TOPICAL at 08:17

## 2020-01-01 RX ADMIN — PIPERACILLIN AND TAZOBACTAM 3.38 G: 3; .375 INJECTION, POWDER, FOR SOLUTION INTRAVENOUS at 09:34

## 2020-01-01 RX ADMIN — APIXABAN 2.5 MG: 2.5 TABLET, FILM COATED ORAL at 20:37

## 2020-01-01 RX ADMIN — SODIUM CHLORIDE 5 MG/HR: 9 INJECTION, SOLUTION INTRAVENOUS at 02:08

## 2020-01-01 RX ADMIN — POTASSIUM CHLORIDE 10 MEQ: 750 TABLET, EXTENDED RELEASE ORAL at 17:49

## 2020-01-01 RX ADMIN — IPRATROPIUM BROMIDE AND ALBUTEROL SULFATE 3 ML: 2.5; .5 SOLUTION RESPIRATORY (INHALATION) at 21:04

## 2020-01-01 RX ADMIN — ISOSORBIDE DINITRATE 10 MG: 10 TABLET ORAL at 20:26

## 2020-01-01 RX ADMIN — SODIUM CHLORIDE, PRESERVATIVE FREE 10 ML: 5 INJECTION INTRAVENOUS at 09:25

## 2020-01-01 RX ADMIN — ISOSORBIDE DINITRATE 10 MG: 10 TABLET ORAL at 21:19

## 2020-01-01 RX ADMIN — SODIUM CHLORIDE, PRESERVATIVE FREE 10 ML: 5 INJECTION INTRAVENOUS at 10:53

## 2020-01-01 RX ADMIN — PIPERACILLIN AND TAZOBACTAM 3.38 G: 3; .375 INJECTION, POWDER, FOR SOLUTION INTRAVENOUS at 11:18

## 2020-01-01 RX ADMIN — DEXTROSE MONOHYDRATE: 50 INJECTION, SOLUTION INTRAVENOUS at 13:27

## 2020-01-01 RX ADMIN — SKIN PROTECTANT: 44 OINTMENT TOPICAL at 20:38

## 2020-01-01 RX ADMIN — IPRATROPIUM BROMIDE AND ALBUTEROL SULFATE 3 ML: 2.5; .5 SOLUTION RESPIRATORY (INHALATION) at 23:51

## 2020-01-01 RX ADMIN — PROPOFOL INJECTABLE EMULSION 10 MCG/KG/MIN: 10 INJECTION, EMULSION INTRAVENOUS at 17:02

## 2020-01-01 RX ADMIN — CLONIDINE HYDROCHLORIDE 0.1 MG: 0.1 TABLET ORAL at 20:27

## 2020-01-01 RX ADMIN — LEVOTHYROXINE SODIUM 112 MCG: 0.11 TABLET ORAL at 06:23

## 2020-01-01 RX ADMIN — IPRATROPIUM BROMIDE AND ALBUTEROL SULFATE 3 ML: 2.5; .5 SOLUTION RESPIRATORY (INHALATION) at 04:10

## 2020-01-01 RX ADMIN — ISOSORBIDE DINITRATE 10 MG: 10 TABLET ORAL at 10:31

## 2020-01-01 RX ADMIN — SODIUM CHLORIDE: 9 INJECTION, SOLUTION INTRAVENOUS at 05:45

## 2020-01-01 RX ADMIN — CARVEDILOL 6.25 MG: 6.25 TABLET, FILM COATED ORAL at 17:00

## 2020-01-01 RX ADMIN — SODIUM CHLORIDE: 9 INJECTION, SOLUTION INTRAVENOUS at 14:34

## 2020-01-01 RX ADMIN — CARVEDILOL 6.25 MG: 6.25 TABLET, FILM COATED ORAL at 17:35

## 2020-01-01 RX ADMIN — PANTOPRAZOLE SODIUM 40 MG: 40 INJECTION, POWDER, FOR SOLUTION INTRAVENOUS at 10:19

## 2020-01-01 RX ADMIN — SODIUM CHLORIDE: 9 INJECTION, SOLUTION INTRAVENOUS at 06:01

## 2020-01-01 RX ADMIN — ONDANSETRON 4 MG: 2 INJECTION INTRAMUSCULAR; INTRAVENOUS at 20:50

## 2020-01-01 RX ADMIN — ISOSORBIDE DINITRATE 10 MG: 10 TABLET ORAL at 13:52

## 2020-01-01 RX ADMIN — CARVEDILOL 1.56 MG: 3.12 TABLET, FILM COATED ORAL at 08:39

## 2020-01-01 RX ADMIN — IPRATROPIUM BROMIDE AND ALBUTEROL SULFATE 3 ML: 2.5; .5 SOLUTION RESPIRATORY (INHALATION) at 04:21

## 2020-01-01 RX ADMIN — IPRATROPIUM BROMIDE AND ALBUTEROL SULFATE 3 ML: 2.5; .5 SOLUTION RESPIRATORY (INHALATION) at 08:05

## 2020-01-01 RX ADMIN — CARVEDILOL 1.56 MG: 3.12 TABLET, FILM COATED ORAL at 11:57

## 2020-01-01 RX ADMIN — IPRATROPIUM BROMIDE AND ALBUTEROL SULFATE 3 ML: 2.5; .5 SOLUTION RESPIRATORY (INHALATION) at 20:33

## 2020-01-01 RX ADMIN — SODIUM CHLORIDE: 9 INJECTION, SOLUTION INTRAVENOUS at 22:30

## 2020-01-01 RX ADMIN — IPRATROPIUM BROMIDE AND ALBUTEROL SULFATE 3 ML: 2.5; .5 SOLUTION RESPIRATORY (INHALATION) at 00:48

## 2020-01-01 RX ADMIN — PROPOFOL INJECTABLE EMULSION 15 MCG/KG/MIN: 10 INJECTION, EMULSION INTRAVENOUS at 03:26

## 2020-01-01 RX ADMIN — SPIRONOLACTONE 25 MG: 25 TABLET ORAL at 20:00

## 2020-01-01 RX ADMIN — FERROUS SULFATE TAB 325 MG (65 MG ELEMENTAL FE) 325 MG: 325 (65 FE) TAB at 17:06

## 2020-01-01 RX ADMIN — IPRATROPIUM BROMIDE AND ALBUTEROL SULFATE 3 ML: 2.5; .5 SOLUTION RESPIRATORY (INHALATION) at 11:24

## 2020-01-01 RX ADMIN — IPRATROPIUM BROMIDE AND ALBUTEROL SULFATE 3 ML: 2.5; .5 SOLUTION RESPIRATORY (INHALATION) at 20:37

## 2020-01-01 RX ADMIN — CARVEDILOL 6.25 MG: 6.25 TABLET, FILM COATED ORAL at 17:49

## 2020-01-01 RX ADMIN — IPRATROPIUM BROMIDE AND ALBUTEROL SULFATE 3 ML: 2.5; .5 SOLUTION RESPIRATORY (INHALATION) at 23:44

## 2020-01-01 RX ADMIN — IPRATROPIUM BROMIDE AND ALBUTEROL SULFATE 3 ML: 2.5; .5 SOLUTION RESPIRATORY (INHALATION) at 04:09

## 2020-01-01 RX ADMIN — IPRATROPIUM BROMIDE AND ALBUTEROL SULFATE 3 ML: 2.5; .5 SOLUTION RESPIRATORY (INHALATION) at 16:54

## 2020-01-01 RX ADMIN — ISOSORBIDE DINITRATE 10 MG: 10 TABLET ORAL at 08:38

## 2020-01-01 RX ADMIN — HYDRALAZINE HYDROCHLORIDE 35 MG: 25 TABLET, FILM COATED ORAL at 23:04

## 2020-01-01 RX ADMIN — ASPIRIN 81 MG 81 MG: 81 TABLET ORAL at 10:30

## 2020-01-01 RX ADMIN — APIXABAN 2.5 MG: 2.5 TABLET, FILM COATED ORAL at 20:40

## 2020-01-01 RX ADMIN — FUROSEMIDE 40 MG: 10 INJECTION, SOLUTION INTRAMUSCULAR; INTRAVENOUS at 09:45

## 2020-01-01 RX ADMIN — WATER 1 G: 1 INJECTION INTRAMUSCULAR; INTRAVENOUS; SUBCUTANEOUS at 11:52

## 2020-01-01 RX ADMIN — LEVOTHYROXINE SODIUM 112 MCG: 0.11 TABLET ORAL at 06:08

## 2020-01-01 RX ADMIN — ASPIRIN 81 MG 81 MG: 81 TABLET ORAL at 09:23

## 2020-01-01 RX ADMIN — ISOSORBIDE DINITRATE 5 MG: 10 TABLET ORAL at 14:05

## 2020-01-01 RX ADMIN — METOPROLOL TARTRATE 25 MG: 25 TABLET, FILM COATED ORAL at 10:10

## 2020-01-01 RX ADMIN — Medication 75 MCG/HR: at 00:02

## 2020-01-01 RX ADMIN — IPRATROPIUM BROMIDE AND ALBUTEROL SULFATE 3 ML: 2.5; .5 SOLUTION RESPIRATORY (INHALATION) at 21:17

## 2020-01-01 RX ADMIN — IPRATROPIUM BROMIDE AND ALBUTEROL SULFATE 3 ML: 2.5; .5 SOLUTION RESPIRATORY (INHALATION) at 00:43

## 2020-01-01 RX ADMIN — ISOSORBIDE DINITRATE 5 MG: 10 TABLET ORAL at 09:46

## 2020-01-01 RX ADMIN — PANTOPRAZOLE SODIUM 40 MG: 40 INJECTION, POWDER, FOR SOLUTION INTRAVENOUS at 08:17

## 2020-01-01 RX ADMIN — IPRATROPIUM BROMIDE AND ALBUTEROL SULFATE 3 ML: 2.5; .5 SOLUTION RESPIRATORY (INHALATION) at 08:01

## 2020-01-01 RX ADMIN — MIDAZOLAM HYDROCHLORIDE: 1 INJECTION, SOLUTION INTRAMUSCULAR; INTRAVENOUS at 02:37

## 2020-01-01 RX ADMIN — LEVOTHYROXINE SODIUM 112 MCG: 0.11 TABLET ORAL at 06:45

## 2020-01-01 RX ADMIN — IPRATROPIUM BROMIDE AND ALBUTEROL SULFATE 3 ML: 2.5; .5 SOLUTION RESPIRATORY (INHALATION) at 17:12

## 2020-01-01 RX ADMIN — IPRATROPIUM BROMIDE AND ALBUTEROL SULFATE 3 ML: 2.5; .5 SOLUTION RESPIRATORY (INHALATION) at 03:52

## 2020-01-01 RX ADMIN — PROPOFOL INJECTABLE EMULSION 10 MCG/KG/MIN: 10 INJECTION, EMULSION INTRAVENOUS at 17:41

## 2020-01-01 RX ADMIN — SACUBITRIL AND VALSARTAN 0.5 TABLET: 24; 26 TABLET, FILM COATED ORAL at 20:16

## 2020-01-01 RX ADMIN — CLONIDINE HYDROCHLORIDE 0.1 MG: 0.1 TABLET ORAL at 09:46

## 2020-01-01 RX ADMIN — SODIUM CHLORIDE 5 MG/HR: 9 INJECTION, SOLUTION INTRAVENOUS at 10:59

## 2020-01-01 RX ADMIN — SKIN PROTECTANT: 44 OINTMENT TOPICAL at 20:26

## 2020-01-01 RX ADMIN — FUROSEMIDE 40 MG: 10 INJECTION, SOLUTION INTRAMUSCULAR; INTRAVENOUS at 10:10

## 2020-01-01 RX ADMIN — ASPIRIN 81 MG 81 MG: 81 TABLET ORAL at 09:47

## 2020-01-01 RX ADMIN — WATER 1 G: 1 INJECTION INTRAMUSCULAR; INTRAVENOUS; SUBCUTANEOUS at 11:25

## 2020-01-01 RX ADMIN — CARVEDILOL 1.56 MG: 3.12 TABLET, FILM COATED ORAL at 17:04

## 2020-01-01 RX ADMIN — SODIUM CHLORIDE 5 MG/HR: 9 INJECTION, SOLUTION INTRAVENOUS at 19:16

## 2020-01-01 RX ADMIN — SODIUM CHLORIDE, PRESERVATIVE FREE 10 ML: 5 INJECTION INTRAVENOUS at 08:39

## 2020-01-01 RX ADMIN — IPRATROPIUM BROMIDE AND ALBUTEROL SULFATE 3 ML: 2.5; .5 SOLUTION RESPIRATORY (INHALATION) at 15:37

## 2020-01-01 RX ADMIN — IPRATROPIUM BROMIDE AND ALBUTEROL SULFATE 3 ML: 2.5; .5 SOLUTION RESPIRATORY (INHALATION) at 00:37

## 2020-01-01 RX ADMIN — IPRATROPIUM BROMIDE AND ALBUTEROL SULFATE 3 ML: 2.5; .5 SOLUTION RESPIRATORY (INHALATION) at 20:08

## 2020-01-01 RX ADMIN — ISOSORBIDE DINITRATE 10 MG: 10 TABLET ORAL at 20:16

## 2020-01-01 RX ADMIN — LEVOTHYROXINE SODIUM 112 MCG: 0.11 TABLET ORAL at 06:10

## 2020-01-01 RX ADMIN — ASPIRIN 81 MG 81 MG: 81 TABLET ORAL at 08:39

## 2020-01-01 RX ADMIN — HEPARIN SODIUM 12 UNITS/KG/HR: 10000 INJECTION, SOLUTION INTRAVENOUS at 17:23

## 2020-01-01 RX ADMIN — IPRATROPIUM BROMIDE AND ALBUTEROL SULFATE 3 ML: 2.5; .5 SOLUTION RESPIRATORY (INHALATION) at 17:28

## 2020-01-01 RX ADMIN — SODIUM CHLORIDE, PRESERVATIVE FREE 10 ML: 5 INJECTION INTRAVENOUS at 20:12

## 2020-01-01 RX ADMIN — PIPERACILLIN AND TAZOBACTAM 3.38 G: 3; .375 INJECTION, POWDER, FOR SOLUTION INTRAVENOUS at 10:40

## 2020-01-01 RX ADMIN — Medication 25 MCG/HR: at 11:14

## 2020-01-01 ASSESSMENT — PULMONARY FUNCTION TESTS
PIF_VALUE: 20
PIF_VALUE: 36
PIF_VALUE: 21
PIF_VALUE: 25
PIF_VALUE: 26
PIF_VALUE: 25
PIF_VALUE: 25
PIF_VALUE: 27
PIF_VALUE: 26
PIF_VALUE: 27
PIF_VALUE: 21
PIF_VALUE: 37
PIF_VALUE: 34
PIF_VALUE: 22
PIF_VALUE: 22
PIF_VALUE: 33
PIF_VALUE: 29
PIF_VALUE: 42
PIF_VALUE: 32
PIF_VALUE: 25
PIF_VALUE: 23
PIF_VALUE: 21
PIF_VALUE: 21
PIF_VALUE: 22
PIF_VALUE: 18
PIF_VALUE: 40
PIF_VALUE: 21
PIF_VALUE: 21
PIF_VALUE: 26
PIF_VALUE: 25
PIF_VALUE: 27
PIF_VALUE: 25
PIF_VALUE: 24
PIF_VALUE: 30
PIF_VALUE: 20
PIF_VALUE: 25
PIF_VALUE: 26
PIF_VALUE: 24
PIF_VALUE: 31
PIF_VALUE: 28
PIF_VALUE: 19
PIF_VALUE: 20
PIF_VALUE: 27
PIF_VALUE: 27
PIF_VALUE: 23
PIF_VALUE: 33
PIF_VALUE: 31
PIF_VALUE: 22
PIF_VALUE: 21
PIF_VALUE: 22
PIF_VALUE: 27
PIF_VALUE: 22
PIF_VALUE: 30
PIF_VALUE: 34
PIF_VALUE: 32
PIF_VALUE: 19
PIF_VALUE: 16
PIF_VALUE: 33
PIF_VALUE: 20
PIF_VALUE: 32
PIF_VALUE: 31
PIF_VALUE: 36
PIF_VALUE: 20
PIF_VALUE: 24
PIF_VALUE: 23
PIF_VALUE: 19
PIF_VALUE: 30
PIF_VALUE: 23
PIF_VALUE: 27
PIF_VALUE: 21
PIF_VALUE: 23
PIF_VALUE: 27
PIF_VALUE: 17
PIF_VALUE: 35
PIF_VALUE: 21
PIF_VALUE: 23
PIF_VALUE: 25
PIF_VALUE: 30
PIF_VALUE: 35
PIF_VALUE: 24
PIF_VALUE: 19
PIF_VALUE: 25
PIF_VALUE: 22
PIF_VALUE: 21
PIF_VALUE: 19
PIF_VALUE: 26
PIF_VALUE: 25
PIF_VALUE: 20
PIF_VALUE: 23
PIF_VALUE: 21
PIF_VALUE: 25
PIF_VALUE: 31
PIF_VALUE: 26
PIF_VALUE: 31
PIF_VALUE: 30
PIF_VALUE: 31
PIF_VALUE: 20
PIF_VALUE: 30
PIF_VALUE: 20
PIF_VALUE: 19

## 2020-01-01 ASSESSMENT — PAIN SCALES - GENERAL
PAINLEVEL_OUTOF10: 0
PAINLEVEL_OUTOF10: 3
PAINLEVEL_OUTOF10: 0

## 2020-01-01 ASSESSMENT — ENCOUNTER SYMPTOMS: SHORTNESS OF BREATH: 0

## 2020-02-28 NOTE — PROGRESS NOTES
Hemorrhoidectomy.    11. Family history negative for premature vascular disease. 12. History of cigarette abuse. abstinent since 1980.    13. Echo, 12/13/2007. LA 4.2. LVH. EF >55%. RVSP 27. E/E' 12.89.     14. Mild anemia. Hemoglobin 11.8, WBC 6.8, platelet count 844,282 - 05/2010. 15. Cataract surgery OD, 08/19/2011. Cataract surgery OS, 10/06/2011. 16. EGD Dr. Wilhelmina Kanner, 03/2013 consistent with GERD. 16. [id number] admission, 11/2014 for bronchitis. 18. Chest CT 06/13/2016. Cardiomegaly. Coronary artery calcification. Pulmonary nodule.    19. CKD. BUN 23, creatinine 1.3 (02/12/2016). Since 2018: SCr 1.3--1.9  20. ER evaluation, 01/18/2017, for hemoptysis.  Chest CT, right pulmonary nodule. Mediastinal adenopathy. 21. Urinary retention/BPH - Follows with Dr. Josué Waters - gaston placed (07/2019) - since has been removed. 22. Pulmonary nodules - noted on CTA (2017) - CT chest - shows pulmonary nodules remain with no significant change. 23. Diastolic HFpEF (admission 07/28/2019) - CXR - possible small right pleural effusion. proBNP [id number].   24. New dx AF (duration unknown; captured on EKG 07/29/2019) [id number] score at least 5. Started on Eliquis 2.5 mg BID (Age, SCr > 1.5). 25. FELICE, 07/30/2019 - Moderate concentric left ventricular hypertrophy.  Normal left ventricle size and systolic function. Moderately dilated left atrium. Heavy spontaneous echo contrast &  spontaneous echo contrast in the left atrium with probable small thrombus. Moderately reduced right ventricle systolic function. Physiologic mitral regurgitation. Trace aortic regurgitation. 26.  DCCV cancelled on 07/30/2019.   27. Lexiscan stress  MPS, 07/2019 was \"uninterpretable\" - per Dr. Joseph Limon; no further testing was needed due to the patient did not have any active ischemic symptoms.     Review of Systems:  Constitutional: negative for fever and chills  Respiratory: negative for cough and hemoptysis  Cardiovascular:   Gastrointestinal: negative for

## 2020-06-29 PROBLEM — R55 SYNCOPE AND COLLAPSE: Status: ACTIVE | Noted: 2020-01-01

## 2020-06-29 NOTE — ED NOTES
Radiology Procedure Waiver   Name: Elena Smith  : 1929  MRN: [id number]    Date:  20    Time: 4:57 PM EDT    Benefits of immediately proceeding with Radiology exam(s) without pre-testing outweigh the risks or are not indicated as specified below and therefore the following is/are being waived:    [] Pregnancy test   [] Patients LMP on-time and regular.   [] Patient had Tubal Ligation or has other Contraception Device. [] Patient  is Menopausal or Premenarcheal.    [] Patient had Full or Partial Hysterectomy. [] Protocol for Iodine allergy    [] MRI Questionnaire     [x] BUN/Creatinine   [] Patient age w/no hx of renal dysfunction. [] Patient on Dialysis. [] Recent Normal Labs.   Electronically signed by Margarito Emerson DO on 20 at 4:57 PM EDT             Margarito Emerson DO  20 1328

## 2020-06-29 NOTE — ED NOTES
Bed: 14B-14  Expected date:   Expected time:   Means of arrival:   Comments:  kevon Smith RN  06/29/20 3759

## 2020-06-30 NOTE — CONSULTS
Consult dictated #[id number]    #1 syncope. Most likely related to underlying cardiac causes, medications could play a role. Doubt to be related to underlying pulmonary embolism. Chest x-rays, venous Doppler, VQ scan and labs seen. #2 bilateral pleural effusions. Most like associated to congestive heart failure patient will benefit of diuretics and cardiology recommendation. No need for thoracentesis at this time unless effusion or hypoxemia gets worse.     Discussed with cardiology

## 2020-06-30 NOTE — CARE COORDINATION
SW spoke with Pt about Transition Plan of care. Pt lives with Son/Sister. Pt was independent prior to admission. Pt uses no DME. PCP: Dr. Ahsan Vaughan. Pharmacy: [facility]. Family will provide transport at discharge. Pt will accept [facility] if needed at discharge and will give choice then. Discharge Plan is to return home. SW/CM to follow for discharge needs.    Chilango Earl, L.S.W.  [phone]

## 2020-06-30 NOTE — CONSULTS
CARDIOLOGY CONSULTATION    Patient Name:  Jonetta Schaumann    :  1929    Reason for Consultation:   Apparent syncope    History of Present Illness:   Jonetta Schaumann presents to [facility], following an episode of syncope currently lasting for minutes while sitting in the waiting room of his primary internist, Carey Aguilera MD.  He was immediately transported to the emergency room for further evaluation. Today, Mr. Nanci Ritter does not recall what actually happened and is alert and spontaneous in his answers. He does have a longstanding history of recurrent persistent atrial flutter-fibrillation for which he underwent cardioversion approximately 1 year ago. He has a longstanding history of coronary artery disease having undergone cardiac catheterization several years ago which apparently revealed an 80% stenosis in his right ventricular acute marginal branch of the RCA and 60% in his first diagonal branch as well as 25% in the obtuse marginal branch. He denies any symptoms of retrosternal chest discomfort presently. Is presently on polypharmacy for both underlying hypertension as well as recurrent persistent atrial fibrillation. Additionally, he has longstanding history of chronic renal insufficiency and chronically elevated proBNP on this occasion has increased significantly even from his significantly elevated previous determination 1 year ago. Nonetheless noninvasive data have all suggested no evidence of significant left ventricular assist Bolick dysfunction but stage II diastolic dysfunction. CT angiograms of both the chest and abdomen are unrevealing for aortic aneurysm but did demonstrate coronary artery calcification. I have been requested to see Mr. Nanci Rittre for further cardiac evaluation of his presenting symptoms and underlying longstanding cardiac disease.   Of note is that he is on multiple medications for his history of longstanding pretension and that he apparently lost consciousness while sitting in a chair and not standing. Past Medical History:   has a past medical history of Anemia, Aneurysm of aorta ([address] Bronchitis, CAD (coronary artery disease), Hypertension, and Thyroid disease. Surgical History:   has a past surgical history that includes Diagnostic Cardiac Cath Lab Procedure (2004); Hemorrhoid surgery; Cataract removal (2011,10/2011); Colonoscopy (2016); transesophageal echocardiogram (2019); and Cardioversion (2019). Social History:   reports that he quit smoking about 46 years ago. His smoking use included cigarettes. He has a 5.00 pack-year smoking history. He has never used smokeless tobacco. He reports that he does not drink alcohol. Family History:  Family history is remarkable for both parents  secondary to infirmities of old age. Medications:  Prior to Admission medications    Medication Sig Start Date End Date Taking?  Authorizing Provider   furosemide (LASIX) 20 MG tablet  2/10/20  Yes Historical Provider, MD   pantoprazole (PROTONIX) 40 MG tablet  2/10/20  Yes Historical Provider, MD   aspirin 81 MG tablet Take 81 mg by mouth daily   Yes Historical Provider, MD   Saw Phelps, Serenoa repens, (SAW PALMETTO PO) Take by mouth daily   Yes Historical Provider, MD   ipratropium-albuterol (Yuly Andrews) 0.5-2.5 (3) MG/3ML SOLN nebulizer solution Inhale 3 mLs into the lungs every 4 hours 19  Yes Lazara Gracia MD   apixaban Audrene Repine) 2.5 MG TABS tablet Take 1 tablet by mouth 2 times daily 19  Yes Cindilayne Ripa, DO   cloNIDine (CATAPRES) 0.1 MG tablet Take 1 tablet by mouth 2 times daily 19  Yes Cindilayne Ripa, DO   hydrALAZINE (APRESOLINE) 10 MG tablet Take 3.5 tablets by mouth 3 times daily 19  Yes Cindilayne Santos, DO   Multiple Vitamins-Minerals (EYE VITAMINS) CAPS Take by mouth daily   Yes Historical Provider, MD   metoprolol (LOPRESSOR) 25 MG tablet Take 25 mg by mouth 2 times daily  13  Yes Historical Provider, MD   potassium chloride (KLOR-CON) 10 MEQ CR tablet Take 10 mEq by mouth 2 times daily    Yes Historical Provider, MD   amLODIPine (NORVASC) 5 MG tablet Take 5 mg by mouth daily. Yes Historical Provider, MD   levothyroxine (SYNTHROID) 112 MCG tablet Take 112 mcg by mouth daily    Yes Historical Provider, MD   albuterol sulfate  (90 Base) MCG/ACT inhaler Inhale 2 puffs into the lungs every 6 hours as needed for Wheezing    Historical Provider, MD   Probiotic Product (PROBIOTIC DAILY PO) Take by mouth daily    Historical Provider, MD       Allergies:  Reglan [metoclopramide] and Other     Review of Systems:   · Constitutional: there has been no unanticipated weight loss. There's been no significant change in energy level, sleep pattern or activity level. No fever chills or rigors. · Eyes: No visual changes or diplopia. No scleral icterus. · ENT: No Headaches, hearing loss or vertigo. No mouth sores or sore throat. No change in taste or smell. · Cardiovascular: No chest discomfort, dyspnea on exertion, palpitations, + loss of consciousness, no phlebitis, no claudication. Chronic edema lower extremities  · Respiratory: No cough or wheezing, no sputum production. No hemoptysis, pleuritic pain. · Gastrointestinal: No abdominal pain, appetite loss, blood in stools. No change in bowel habits. No hematemesis  · Genitourinary: No dysuria, trouble voiding or hematuria. No nocturia or increased frequency. · Musculoskeletal:  No gait disturbance, weakness or joint complaints. · Integumentary: No rash or pruritis. · Neurological: No headache, diplopia, change in muscle strength, numbness or tingling. No change in gait, balance, coordination, mood, affect, memory, mentation, behavior. · Psychiatric: No anxiety or depression.  + Cognitive dysfunction  · Endocrine: No temperature intolerance. No excessive thirst, fluid intake, or urination. No tremor.   · Hematologic/Lymphatic: No abnormal touch. Coordination intact. Pertinent Labs:  CBC:   Recent Labs     20  1602 20  0943   WBC 6.1 6.0   HGB 9.0* 9.8*    196     BMP:  Recent Labs     20  1602 20  0943    140   K 4.7 4.4   CL 99 99   CO2 26 26   BUN 60* 59*   CREATININE 2.0* 1.8*   GLUCOSE 107* 106*   LABGLOM 38 43     ABGs:   Lab Results   Component Value Date    PH 7.388 2020    PO2 94.0 2020    PCO2 46.8 2020     INR: No results for input(s): INR in the last 72 hours. PRO-BNP:   Lab Results   Component Value Date    PROBNP 34,147 (H) 2020    PROBNP 14,177 (H) 2019      Cardiac Injury Profile:   Recent Labs     20  1602   TROPONINI 0.09*      Lipid Profile: No results found for: TRIG, HDL, LDLCALC, CHOL   Thyroid:   Lab Results   Component Value Date    TSH 6.300 (H) 2019      Hemoglobin A1C: No components found for: HGBA1C   ECG:  See report    Radiology:  Xr Chest Standard (2 Vw)    Result Date: 2020  Patient MRN: [id number] : 1929 Age:  80 years Gender: Male Order Date: 2020 5:15 PM Exam: XR CHEST (2 VW) Number of Images: 2 view Indication:   sob sob Comparison: 2019 Findings: There is a suggestion of bilateral pleural effusions The heart is enlarged. Lung fields demonstrate patchy bilateral infiltrates which could be related to pulmonary edema. The aorta is tortuous and ectatic     Cardiomegaly Tortuous aorta There are patchy infiltrates seen throughout both the lung fields. Pulmonary edema could have this appearance There are bilateral pleural effusions present.      Us Dup Lower Extremities Bilateral Venous    Result Date: 2020  Patient MRN:  [id number] : 1929 Age: 80 years Gender: Male Order Date:  2020 6:45 PM EXAM: US DUP LOWER EXTREMITIES BILATERAL VENOUS INDICATION:  Elevated d dimer syncope concern for DVT leg pain Elevated d dimer syncope concern for DVT leg pain  COMPARISON: None FINDINGS: Normal color flow is seen in the veins, which demonstrate normal compressibility and augmentation. No evidence of deep venous thrombosis. Assessment:    Active Problems:    Syncope and collapse  Resolved Problems:    * No resolved hospital problems. *      Plan:  Mr. Ish Bess has the potential etiologies for his loss of consciousness most likely related to either a sudden drop in blood pressure secondary to medications and/or a sudden decrease or increase and cardiac rhythm associated with underlying transient heart block or sustained episode of tachycardia. In a 80-year-old gentleman is exceedingly difficult to satisfy all of his cardiovascular needs with a simple pharmacologic regimen as I do not believe that he did sustain a pulmonary embolism based upon his age relative to his d-dimer result. Likewise his edema in his lower extremities may even be a result of his ingesting amlodipine on a chronic basis but certainly a two-dimensional echocardiogram will need to be obtained to reconfirm his previous results or any significant change that would explain his present uncle and laboratory findings. Based upon the two-dimensional echocardiogram report I will adjust his cardiac medications carefully as possible in order to hopefully void any potential drug-induced complications. Additionally, even should he have a pulmonary embolism, he is already on apixaban admittedly on a lower dose than what would be used for an acute pulmonary embolism. I have spent more than 55 minutes face to face with Atul Mason reviewing notes and laboratory data with greater than 50% of this time instructing and counseling the patient regarding my findings and recommendations and I have answered all questions as posed to me by Mr. Ish Bess. and discussed his clinical status as well with Dr. Ernesto Sanchez is consulted pulmonologist.  Thank you, Rika Gibbs MD for allowing me to consult in the care of this patient.     Chirag Lord [facility]    NOTE:  This report was transcribed using voice recognition software. Every effort was made to ensure accuracy; however, inadvertent computerized transcription errors may be present.

## 2020-06-30 NOTE — H&P
H&P Note        CHIEF COMPLAINT:  syncope      HISTORY OF PRESENT ILLNESS:                   Marylee Cowman is a 80 y.o. male presenting to the ED for Syncope. History was from patient's daughter states that she was signing him up to see me in my office when she turned around found him passed out in a chair. Patient that he is been fatigued and short of breath recently. He has no chest pain. He denies fever chills or cough. He has no melena hematochezia or abdominal pain. He had a recent skin tear over his right lower leg he has been using topical antibiotics for. History of DVT or PE. Past Medical History:    Past Medical History:   Diagnosis Date    Anemia 05/2010    Aneurysm of aorta ([facility] [address] 1990    Bronchitis 2014    [facility] no cardiac physicians    CAD (coronary artery disease)     right ventricular branch of the RCA    Hypertension     Thyroid disease        Past Surgical History:    Past Surgical History:   Procedure Laterality Date    CARDIOVERSION  07/2019    CATARACT REMOVAL  08/2011,10/2011    COLONOSCOPY  01/2016    DIAGNOSTIC CARDIAC CATH LAB PROCEDURE  08/2004    HEMORRHOID SURGERY      TRANSESOPHAGEAL ECHOCARDIOGRAM  07/30/2019    Dr Thea Suggs       Medications Prior to Admission:    Prior to Admission medications    Medication Sig Start Date End Date Taking?  Authorizing Provider   furosemide (LASIX) 20 MG tablet  2/10/20  Yes Historical Provider, MD   pantoprazole (PROTONIX) 40 MG tablet  2/10/20  Yes Historical Provider, MD   aspirin 81 MG tablet Take 81 mg by mouth daily   Yes Historical Provider, MD Jose Alfredo Hou Serenoa repens, (SAW PALMETTO PO) Take by mouth daily   Yes Historical Provider, MD   ipratropium-albuterol (DUONEB) 0.5-2.5 (3) MG/3ML SOLN nebulizer solution Inhale 3 mLs into the lungs every 4 hours 9/2/19  Yes Yosvany Kirby MD   apixaban Demarco Larve) 2.5 MG TABS tablet Take 1 tablet by mouth 2 times daily 8/4/19  Yes Brenden Wooten,  cloNIDine (CATAPRES) 0.1 MG tablet Take 1 tablet by mouth 2 times daily 19  Yes Franck Norton, DO   hydrALAZINE (APRESOLINE) 10 MG tablet Take 3.5 tablets by mouth 3 times daily 19  Yes Franck Norton, DO   Multiple Vitamins-Minerals (EYE VITAMINS) CAPS Take by mouth daily   Yes Historical Provider, MD   metoprolol (LOPRESSOR) 25 MG tablet Take 25 mg by mouth 2 times daily  13  Yes Historical Provider, MD   potassium chloride (KLOR-CON) 10 MEQ CR tablet Take 10 mEq by mouth 2 times daily    Yes Historical Provider, MD   amLODIPine (NORVASC) 5 MG tablet Take 5 mg by mouth daily. Yes Historical Provider, MD   levothyroxine (SYNTHROID) 112 MCG tablet Take 112 mcg by mouth daily    Yes Historical Provider, MD   albuterol sulfate  (90 Base) MCG/ACT inhaler Inhale 2 puffs into the lungs every 6 hours as needed for Wheezing    Historical Provider, MD   Probiotic Product (PROBIOTIC DAILY PO) Take by mouth daily    Historical Provider, MD       Allergies:    Reglan [metoclopramide] and Other    Social History:   Social History     Socioeconomic History    Marital status:       Spouse name: Not on file    Number of children: Not on file    Years of education: Not on file    Highest education level: Not on file   Occupational History    Not on file   Social Needs    Financial resource strain: Not on file    Food insecurity     Worry: Not on file     Inability: Not on file    Transportation needs     Medical: Not on file     Non-medical: Not on file   Tobacco Use    Smoking status: Former Smoker     Packs/day: 0.20     Years: 25.00     Pack years: 5.00     Types: Cigarettes     Last attempt to quit: 1974     Years since quittin.0    Smokeless tobacco: Never Used   Substance and Sexual Activity    Alcohol use: No    Drug use: Not on file    Sexual activity: Not on file   Lifestyle    Physical activity     Days per week: Not on file     Minutes per session: Not on file    Stress: Not on file   Relationships    Social connections     Talks on phone: Not on file     Gets together: Not on file     Attends [employer] service: Not on file     Active member of club or organization: Not on file     Attends meetings of clubs or organizations: Not on file     Relationship status: Not on file    Intimate partner violence     Fear of current or ex partner: Not on file     Emotionally abused: Not on file     Physically abused: Not on file     Forced sexual activity: Not on file   Other Topics Concern    Not on file   Social History Narrative    Not on file       Family History:   Family History   Family history unknown: Yes       REVIEW OF SYSTEMS:    General:  No fever or chills. No lightheadedness or dizziness  Cardiovascular: Denies any chest pain, irregular heartbeats, or palpitations. Respiratory: Denies shortness of breath, coughing, sputum production, hemoptysis, or wheezing. Gastrointestinal: Denies nausea, vomiting, diarrhea, or constipation. Denies any   abdominal pain. Extremities: Denies any lower extremity swelling or edema. Neurology:  Denies any headache or focal neurological deficits. No weakness or   paresthesia. Derm:  Denies any rashes, ulcers, or excoriations. Denies bruising. Genitourinary:  Denies any urgency, frequency, hematuria. Voiding without difficulty. Musculoskeletal:  Denies any myalgia or arthralgia. No back pain. PHYSICAL EXAM:    Vitals:  /77   Pulse 73   Temp 97.5 °F (36.4 °C) (Temporal)   Resp 16   Ht 5' 6\" (1.676 m)   Wt 145 lb (65.8 kg)   SpO2 99%   BMI 23.40 kg/m²     General:  This is a 80 y.o. yo male who is alert and oriented in NAD  HEENT:  Head is normocephalic and atraumatic, PERRLA, EOMI, mucus membranes moist with no pharyngeal erythema or exudate. Neck:  Supple with no carotid bruits, JVD or thyromegaly.   No cervical adenopathy  CV:  Regular rate and rhythm, no murmurs  Lungs:  Clear to auscultation bilaterally with no wheezes, rales or rhonchi  Abdomen:  Soft, nontender, nondistended, bowel sounds present  Extremities:  No edema, peripheral pulses intact bilaterally  Neuro:  Cranial nerves II-XII grossly intact; motor and sensory function intact with no focal deficits  Skin:  No rashes, lesions or wounds  Osteopathic Structural:  Patient examined in the seated and supine positions. Normal lordosis and kyphosis of the spine with no acute tissue texture abnormalities. LABS:  reviewed    ASSESSMENT:    Active Problems:    Syncope and collapse  Resolved Problems:    * No resolved hospital problems.  *  Clinically unable to determine      Anemia 05/2010    Aneurysm of aorta ([facility] [address] 1990    Bronchitis 2014    [facility] no cardiac physicians    CAD (coronary artery disease)     right ventricular branch of the RCA    Hypertension     Thyroid disease        PLAN:  Consult cardiology, pt, ot, resume meds, change lasix to iv, monitor labs      Electronically signed by Escobar Bhakta MD on 6/30/2020 at 9:56 AM

## 2020-07-01 NOTE — PROGRESS NOTES
Pulmonary  Progress Note    Admit Date: 2020                            PCP: Yo Lawson MD  Patient Active Problem List   Diagnosis    Anemia    Hypertension    CAD (coronary artery disease)    GERD (gastroesophageal reflux disease)    Former smoker, stopped smoking many years ago    Atrial fibrillation ([facility]    CHF (congestive heart failure), NYHA class I, acute on chronic, combined ([facility]    Acute on chronic congestive heart failure ([facility]    TIA (transient ischemic attack)    Chronic diastolic (congestive) heart failure (HCC)    Stage 3 chronic kidney disease (HCC)    Syncope and collapse       Subjective:  -No events overnight, afebrile, no chest pain no increased shortness of breath. Medications:       apixaban  2.5 mg Oral BID    aspirin  81 mg Oral Daily    ipratropium-albuterol  3 mL Inhalation Q4H    levothyroxine  112 mcg Oral Daily    pantoprazole  40 mg Oral QAM AC    furosemide  40 mg Intravenous Daily    isosorbide dinitrate  5 mg Oral TID    hydrALAZINE  10 mg Oral 3 times per day    carvedilol  6.25 mg Oral BID WC    amLODIPine  2.5 mg Oral Daily    cloNIDine  0.1 mg Oral BID    potassium chloride  10 mEq Oral BID WC       Vitals:  Tmax:  VITALS:  /66   Pulse 80   Temp 97.9 °F (36.6 °C) (Temporal)   Resp 18   Ht 5' 6\" (1.676 m)   Wt 145 lb (65.8 kg)   SpO2 94%   BMI 23.40 kg/m²   24HR INTAKE/OUTPUT:      Intake/Output Summary (Last 24 hours) at 2020 1445  Last data filed at 2020 1422  Gross per 24 hour   Intake 1410 ml   Output 1450 ml   Net -40 ml     CURRENT PULSE OXIMETRY:  SpO2: 94 %  24HR PULSE OXIMETRY RANGE:  SpO2  Av.5 %  Min: 94 %  Max: 97 %  CVP:    VENT SETTINGS:   Vent Information  SpO2: 94 %  Additional Respiratory  Assessments  Pulse: 80  Resp: 18  SpO2: 94 %      EXAM:  General: No distress. Alert. Eyes: PERRL. No sclera icterus. No conjunctival injection. ENT: No discharge. Pharynx clear. Neck: Trachea midline.  Normal

## 2020-07-01 NOTE — CONSULTS
underlying pulmonary  embolism. First of all, the patient was receiving clinically Eliquis  which is rightfully adjusted for the patient's creatinine clearance. 2.  The patient is now having worsening hypoxemia and the ABG was  suspected to be as such. The patient does have CHF and as well has  bilateral pleural effusion, which may contribute to the patient's mild  hypoxemia. I agree at this time with the use of IV diuretics in that  regard. The patient would not require any changes in anticoagulation,  which should be safe to d/c the heparin and re-start the Eliquis. At this  time, the patient doesn't need thoracentesis. We need to add diuretics  first.  If the patient's effusion gets worse,ot if the patient is unable to  handle diuretics, the patient will benefit from diagnostic and  therapeutic thoracentesis.         Marissa Fan MD    D: 06/30/2020 12:31:18       T: 07/01/2020 0:17:22     [email or web address]  Job#: [id number]     Doc#: [id number]

## 2020-07-01 NOTE — PROGRESS NOTES
PROGRESS NOTE       PATIENT PROBLEM LIST:  Active Problems:    Syncope and collapse  Resolved Problems:    * No resolved hospital problems. *      SUBJECTIVE:  Beny Benavides states  he is fatigued and wishes to sleep but he did not sleep well last evening. He denies any significant shortness of breath nor lightheadedness. Blood pressure presently stable and low normal.    REVIEW OF SYSTEMS:  General ROS: negative for - fatigue, malaise,  weight gain or weight loss  Psychological ROS: negative for - anxiety , depression  Ophthalmic ROS: negative for - decreased vision or visual distortion. ENT ROS: negative  Allergy and Immunology ROS: negative  Hematological and Lymphatic ROS: negative  Endocrine: no heat or cold intolerance and no polyphagia, polydipsia, or polyuria  Respiratory ROS: positive for - shortness of breath  Cardiovascular ROS: positive for - irregular heartbeat and shortness of breath. Lower extremity edema. Transient loss of consciousness. Gastrointestinal ROS: no abdominal pain, change in bowel habits, or black or bloody stools  Genito-Urinary ROS: no nocturia, dysuria, trouble voiding, frequency or hematuria  Musculoskeletal ROS: negative for- myalgias, arthralgias, or claudication  Neurological ROS: no TIA or stroke symptoms otherwise no significant change in symptoms or problems since yesterday as documented in previous progress notes.     SCHEDULED MEDICATIONS:   apixaban  2.5 mg Oral BID    isosorbide dinitrate  10 mg Oral TID    spironolactone  25 mg Oral Q MWF    aspirin  81 mg Oral Daily    ipratropium-albuterol  3 mL Inhalation Q4H    levothyroxine  112 mcg Oral Daily    pantoprazole  40 mg Oral QAM AC    furosemide  40 mg Intravenous Daily    carvedilol  6.25 mg Oral BID WC    cloNIDine  0.1 mg Oral BID       VITAL SIGNS:                                                                                                                          BP (!) 124/59   Pulse 80   Temp 97.3 °F (36.3 °C) (Temporal)   Resp 16   Ht 5' 6\" (1.676 m)   Wt 145 lb (65.8 kg)   SpO2 95%   BMI 23.40 kg/m²   Patient Vitals for the past 96 hrs (Last 3 readings):   Weight   06/29/20 1513 145 lb (65.8 kg)     OBJECTIVE:    HEENT: PERRL, EOM  Intact; sclera non-icteric, conjunctiva pink. Carotids are brisk in upstroke with normal contour. No carotid bruits. Normal jugular venous pulsation at 45°. No palpable cervical nor supraclavicular nodes. Thyroid not palpable. Trachea midline. Chest: Even excursion  Lungs: CTA B, no expiratory wheezes or rhonchi, no decreased tactile fremitus without inspiratory rales. Heart: Regular  rhythm; S1 > S2, no gallop or murmur. No clicks, rub, palpable thrills   or heaves. PMI nondisplaced, 5th intercostal space MCL. Abdomen: Soft, nontender, nondistended,  mildly protuberant, no masses or organomegaly. Bowel sounds active. Extremities: Without clubbing, cyanosis or edema. Pulses present 3+ upper extermities bilaterally; present 1+ DP and present 1+ PT bilaterally. Data:   Scheduled Meds: Reviewed  Continuous Infusions:     Intake/Output Summary (Last 24 hours) at 7/1/2020 1803  Last data filed at 7/1/2020 1752  Gross per 24 hour   Intake 1770 ml   Output 1650 ml   Net 120 ml     CBC:   Recent Labs     06/29/20  1602 06/30/20  0943 07/01/20  0450   WBC 6.1 6.0 6.4   HGB 9.0* 9.8* 8.8*   HCT 29.8* 32.3* 28.9*    196 195     BMP:  Recent Labs     06/29/20  1602 06/30/20  0943    140   K 4.7 4.4   CL 99 99   CO2 26 26   BUN 60* 59*   CREATININE 2.0* 1.8*   LABGLOM 38 43     ABGs:   Lab Results   Component Value Date    PH 7.388 06/29/2020    PO2 94.0 06/29/2020    PCO2 46.8 06/29/2020     INR: No results for input(s): INR in the last 72 hours.   PRO-BNP:   Lab Results   Component Value Date    PROBNP 34,147 (H) 06/29/2020    PROBNP 14,177 (H) 08/29/2019      TSH:   Lab Results   Component Value Date    TSH 6.300 (H) 07/28/2019      Cardiac Injury Profile: Order Date:  6/29/2020 6:45 PM EXAM: US DUP LOWER EXTREMITIES BILATERAL VENOUS INDICATION:  Elevated d dimer syncope concern for DVT leg pain Elevated d dimer syncope concern for DVT leg pain  COMPARISON: None FINDINGS: Normal color flow is seen in the veins, which demonstrate normal compressibility and augmentation. No evidence of deep venous thrombosis. EKG: See Report  Echo: See Report      IMPRESSIONS:  Active Problems:    Syncope and collapse  Resolved Problems:    * No resolved hospital problems. *      RECOMMENDATIONS:  We will further adjust Mr. Linda Pascal medications to help consolidate them and once his two-dimensional echocardiogram is finally completed before discharge. We will carefully monitor serum potassium with spironolactone Monday Wednesday Friday only. spoke with daughter was present during his loss of consciousness which initiated with shortness of breath and sitting down and slumping over in his chair for seconds only. I have spent more than  25 minutes face to face with Dauna Blinks and reviewing notes and laboratory data, with greater than 50% of this time instructing and counseling the patient  And his daughter face to face regarding my findings and recommendations and I have answered all questions as posed to me by Mr. Linda Pascal. Isabell Hodge, DO FACP,FACC,[facility]      NOTE:  This report was transcribed using voice recognition software.   Every effort was made to ensure accuracy; however, inadvertent computerized transcription errors may be present

## 2020-07-01 NOTE — PROGRESS NOTES
Angelique Rubio is a 80 y.o. male patient.     Current Facility-Administered Medications   Medication Dose Route Frequency Provider Last Rate Last Dose    apixaban (ELIQUIS) tablet 2.5 mg  2.5 mg Oral BID Bg Benton, DO   2.5 mg at 07/01/20 1300    mineral oil-hydrophilic petrolatum (HYDROPHOR) ointment   Topical BID PRN Kd Akers MD        acetaminophen (TYLENOL) tablet 500 mg  500 mg Oral Q6H PRN Kd Akers MD        aspirin chewable tablet 81 mg  81 mg Oral Daily Kd Akers MD   81 mg at 07/01/20 0947    ipratropium-albuterol (DUONEB) nebulizer solution 3 mL  3 mL Inhalation Q4H Kd Akers MD   3 mL at 07/01/20 1141    levothyroxine (SYNTHROID) tablet 112 mcg  112 mcg Oral Daily Kd Akers MD   112 mcg at 07/01/20 0529    pantoprazole (PROTONIX) tablet 40 mg  40 mg Oral QAM AC Kd Akers MD   40 mg at 07/01/20 0530    furosemide (LASIX) injection 40 mg  40 mg Intravenous Daily Kd Akers MD   40 mg at 07/01/20 0945    perflutren lipid microspheres (DEFINITY) injection 1.65 mg  1.5 mL Intravenous ONCE PRN Bg Benton, DO        isosorbide dinitrate (ISORDIL) tablet 5 mg  5 mg Oral TID Bg Benton, DO   5 mg at 07/01/20 1405    hydrALAZINE (APRESOLINE) tablet 10 mg  10 mg Oral 3 times per day Bg Benton, DO   10 mg at 07/01/20 1405    carvedilol (COREG) tablet 6.25 mg  6.25 mg Oral BID WC Bg Benton, DO   6.25 mg at 07/01/20 0800    amLODIPine (NORVASC) tablet 2.5 mg  2.5 mg Oral Daily Bg Benton, DO   2.5 mg at 07/01/20 8091    cloNIDine (CATAPRES) tablet 0.1 mg  0.1 mg Oral BID Kd Akers MD   0.1 mg at 07/01/20 0946    potassium chloride (KLOR-CON M) extended release tablet 10 mEq  10 mEq Oral BID  Kd Akers MD   10 mEq at 07/01/20 1133     Facility-Administered Medications Ordered in Other Encounters   Medication Dose Route Frequency Provider Last Rate Last Dose    iohexol (OMNIPAQUE 240) injection 50 mL  50 mL Oral ONCE PRN Basem A Ashlegih        ioversol (OPTIRAY) 68 % injection 120 mL  120 mL Intravenous ONCE PRN Basem A Ashleigh         Allergies   Allergen Reactions    Reglan [Metoclopramide] Other (See Comments)     tremors    Other      Flu Vaccine  Pneumococcal Vaccines     Active Problems:    Syncope and collapse  Resolved Problems:    * No resolved hospital problems. *    Blood pressure 128/66, pulse 80, temperature 97.9 °F (36.6 °C), temperature source Temporal, resp. rate 18, height 5' 6\" (1.676 m), weight 145 lb (65.8 kg), SpO2 94 %. Subjective:  Symptoms:  Improved. No shortness of breath, chest pain or chest pressure. Diet:  Adequate intake. Activity level: Impaired due to weakness. Pain:  He reports no pain. Objective:  General Appearance:  Well-appearing. Vital signs: (most recent): Blood pressure 123/67, pulse 74, temperature 97.7 °F (36.5 °C), temperature source Oral, resp. rate 28, height 5' 6\" (1.676 m), weight 146 lb (66.2 kg), SpO2 96 %. No fever. Output: Producing urine and producing stool. HEENT: Normal HEENT exam.    Lungs:  Normal effort. Heart: Normal rate. Abdomen: Abdomen is soft. Bowel sounds are normal.   There is no abdominal tenderness. Extremities: Normal range of motion. Pulses: Distal pulses are intact. Neurological: Patient is alert and oriented to person, place and time. Pupils:  Pupils are equal, round, and reactive to light. Skin:  Warm. Assessment:  (Syncope     Anemia 05/2010   Aneurysm of aorta ([address] 1990   Bronchitis 2014   [facility] no cardiac physicians   CAD (coronary artery disease)    right ventricular branch of the RCA   Hypertension    Thyroid disease  Plan: doing better, we are getting pt and ot   ).        Handy Heredia MD  7/1/2020

## 2020-07-02 NOTE — PROGRESS NOTES
Occupational Therapy  OCCUPATIONAL THERAPY INITIAL EVALUATION      Date:2020  Patient Name: Marni Conner  MRN: [id number]  : 1929  Room: [facility]6/[facility]-A    Referring Physician:  Harsh Lynn MD    Evaluating OT:  MECCA Dawson, OTR/L #[id number]      AM-PAC Daily Activity Raw Score:  1424  Recommended Adaptive Equipment:  TBD as pt progresses     Reason for Admission:  Pt was admitted after experiencing LOC at [de-identified] office    Diagnosis:  Syncope and Collapse, Chronic Renal Failure     Procedures this admission:  None     Pertinent Medical History:  Anemia, CAD, HTN     Precautions:  Falls  NC O2 4 LPM - continuous  Cardiac Diet    Home Living: Pt lives with his Son in a single-level house with 2 MICHELLE and 1 HR/s + 3 inside steps to main floor. Bed/bath on main floor   Bathroom setup:  Tub-Shower, standard commode, Grab bars throughout   Equipment owned:  Raised Commode seat, BSC, Shower Chair, Standard Walker     Available Family Assist:  Son reports that he is able to provide 24/7 assist     Prior Level of Function:  IND with ADLs, Transfers and Mobility using No AD for ambulation. Light IADLs - Family does most.    Driving:  Yes - Errands, Goes to the [employer] to \"visit his wife\"  Occupation:  Retired  from [employer]    Pain Level:  Denies pain this session;  Nsg Notified   Additional Complaints:  SOB, Hypoxia    Vitals/Lab Values:  HR 90-94  O2 sats 87% w/ standing ax w/ NC O2 3 LPM, increased to 94% after seated rest break w/ NC O2 4 LPM    Cognition: A & O x 2 - ID'd current month as \"November\" unable to ID month despite Max VCs/Visual Cues   Able to Follow Simple Commands w/ Min-Mod VCs   Memory:  fair    Sequencing:  fair    Problem solving:  fair    Judgement/safety:  fair   Additional Comments:  Pt was pleasant, cooperative. Son present and very doting.          Functional Assessment:   Initial Eval Status  Date: 20 Treatment Status  Date: Short Term/Long Term Goals  Treatment frequency: PRN 2-4 x/week  1-2 weeks   Feeding Set up    Able to feed self after good set up of tray per report  NA   Grooming Set up    Able to complete simple tasks after set up while seated in chair, unable to tolerate ambulating to bathroom/standing at sink d/t hypoxia, limited endurance  SUP  Seated/Standing At The Sink   UB Dressing Mod A/Set up    Required Mod A to wrap garment around back, thread UEs seated in chair, Mod VCs  Min A   LB Dressing Max A/Set up    Seated/standing  Mod A for dynamic standing balance  Min A   Bathing NT      Min A   Toileting NT    Pt declined  Min A   Bed Mobility  Rolling:  NT  Repositioning:  NT   Supine to Sit:  NT    Sit to Supine:  NT     In chair prior to and after session   IND   Functional Transfers Sit to stand:  Min A  Stand to sit:  Min A      From chair 4x, Mod VCs/Pt ed re: safety/hand placement  SUP   Functional Mobility Min A w/ Standard Walker    Able to tolerate ~ 10' 2x w/ extended seated rest break b/t trials, O2 sats decreased to 87% on 3L  Per son uses std walker at home  SUP   Balance Sitting:      Static:  IND in chair    Dynamic:  SUP in chair w/ functional ax    Standing:      Static:  Min A w/ Standard Walker    Dynamic:  Min A w/ Standard Walker w/ functional ax/mobility     Activity Tolerance Fair  Limited by hypoxia, general weakness    Tolerated Sitting: In chair extended time w/ functional ax  Tolerated Standing:  ~ 2 mins 2x w/ seated rest break b/t trials w/ functional ax     Visual/  Perceptual WFL  Glasses:  Yes      Hearing WFL  Hearing Aids  No       Hand dominance: Right    UE ROM: RUE:  WFL      LUE:  WFL    Strength: RUE: grossly 4/5     LUE: grossly 4/5     Strength:  WFL Johnny UEs    Fine Motor Coordination:  WFL Johnny UEs    Sensation:  Denies numbness or tingling Johnny UEs  Tone:  WFL Johnny UEs  Edema:  None Noted                            Upon arrival, pt was found seated in chair.   He was agreeable to participate in therapeutic ax. His Son was present during session. Received permission from RN prior to engaging pt in OT services. Treatment:      Provided Skilled SUP/Assist w/ Pt safety, Proper Positioning, ADLs, Functional Transfers and Functional Mobility as noted above, as well as set up and clean up for session. Skilled monitoring of Vitals and pts response to treatment. Consulted RN, Family    -- Education:  Provided Pt/Family ed re: Benefits/Purpose of OT services;  OT Plan of Care;  Walker safety w/ Functional Activity/Mobility; Benefits of/Techs for use of DME/AD/Adaptive equip/techs to increase safety/IND with Functional Ax; Techs to increase Safety/Safety Awareness w/ Functional Ax; Energy Conservation Techs/Pursed-Lip Breathing;  Benefits of Cont'd Participation in OT services during hospitalization and at D/C      Pt and/or Family verbalized/demonstrated a Good(-) understanding of education provided. Will Review PRN. At the end of the session, patient was properly positioned in b/s chair. Call light and phone within reach, all lines and tubes intact. Oriented pt to call bell. Made all appropriate Environmental Modifications to facilitate pt's level of IND and safety. All needs met.   Son Remained at b/s         Assessment of current deficits   Functional mobility [x]  ADLs [x] Strength [x]  Cognition []  Functional transfers  [x] IADLs [x] Safety Awareness [x]  Endurance [x]  Fine Motor Coordination [x] Balance [x] Vision/perception [] Sensation []   Gross Motor Coordination [] ROM [] Delirium []                  Motor Control []    Plan of Care:   ADL retraining [x]   Equipment needs [x]   Neuromuscular re-education [] Energy Conservation Techniques [x]  Functional Transfer training [x] Patient and/or Family Education [x]  Functional Mobility training [x]  Environmental Modifications [x]  Cognitive re-training []   Compensatory techniques for ADLs [x]  Splinting Needs []   Positioning to improve overall function [x]   Therapeutic Activity [x]   Therapeutic Exercise  [x]  Visual/Perceptual: []    Delirium prevention/treatment  []  Other:  [x]    Pt would benefit from continued skilled OT services to increase safety and independence with completion of ADL/IADL tasks for functional independence and quality of life. Pt/Family actively participated in the establishment of goals. Rehab Potential:  Good(-) for established goals    Patient / Family Goal:  Return home with family assist     Patient and/or Family were instructed on Functional Diagnosis, Prognosis/Goals and OT Plan of Care. Demonstrated Good(-) understanding. Evaluation Time includes thorough review of current medical information, gathering information on past medical history/social history and prior level of function, completion of standardized testing/informal observation of tasks, assessment of data and education on plan of care and goals.      Eval Complexity: Mod  Profile and History - Mod  Assessment of Occupational Performance and Identification of Deficits - Mod  Clinical Decision Making - Mod       Mod Evaluation + 25 timed treatment minutes    Time In:  1113              Time Out:  1146    Treatment Charges: Mins Units   Ther Ex  34458     Manual Therapy 00580     Thera Activities 97533     ADL/Home t 09856 25 2   Neuro Re-ed 72494     Group Therapy      Orthotic manage/training  37667     Non-Billable Time     Total Timed Treatment 25 [address], [place], OTR/L #[id number]

## 2020-07-02 NOTE — PROGRESS NOTES
Pulmonary  Progress Note    Admit Date: 2020                            PCP: Qiana Dennis MD  Patient Active Problem List   Diagnosis    Anemia    Hypertension    CAD (coronary artery disease)    GERD (gastroesophageal reflux disease)    Former smoker, stopped smoking many years ago    [facility]    CHF (congestive heart failure), NYHA class I, acute on chronic, combined ([facility]    Acute on chronic congestive heart failure ([facility]    TIA (transient ischemic attack)    Chronic diastolic (congestive) heart failure (HCC)    Stage 3 chronic kidney disease (HCC)    Syncope and collapse       Subjective:  -No events overnight, afebrile, no chest pain no increased shortness of breath. Medications:       apixaban  2.5 mg Oral BID    isosorbide dinitrate  10 mg Oral TID    spironolactone  25 mg Oral Q MWF    aspirin  81 mg Oral Daily    ipratropium-albuterol  3 mL Inhalation Q4H    levothyroxine  112 mcg Oral Daily    pantoprazole  40 mg Oral QAM AC    furosemide  40 mg Intravenous Daily    carvedilol  6.25 mg Oral BID WC    cloNIDine  0.1 mg Oral BID       Vitals:  Tmax:  VITALS:  /76   Pulse 86   Temp 97.5 °F (36.4 °C) (Temporal)   Resp 20   Ht 5' 6\" (1.676 m)   Wt 146 lb (66.2 kg)   SpO2 95%   BMI 23.57 kg/m²   24HR INTAKE/OUTPUT:      Intake/Output Summary (Last 24 hours) at 2020 1358  Last data filed at 2020 1259  Gross per 24 hour   Intake 1380 ml   Output 1225 ml   Net 155 ml     CURRENT PULSE OXIMETRY:  SpO2: 95 %  24HR PULSE OXIMETRY RANGE:  SpO2  Av %  Min: 95 %  Max: 95 %4 lt  CVP:    VENT SETTINGS:   Vent Information  SpO2: 95 %  Additional Respiratory  Assessments  Pulse: 86  Resp: 20  SpO2: 95 %      EXAM:  General: No distress. Alert. Eyes: PERRL. No sclera icterus. No conjunctival injection. ENT: No discharge. Pharynx clear. Neck: Trachea midline. Normal thyroid. Resp: No accessory muscle use. No crackles. No wheezing. No rhonchi.

## 2020-07-02 NOTE — PROGRESS NOTES
Physical Therapy  Physical Therapy Initial Assessment     Name: Flip Ashford  : 1929  MRN: [id number]    Referring Provider:  Yo Lawson MD    Date of Service: 2020    Evaluating PT:  Ria Rodriguez PT, DPT. [id number]    Room #:  4355/2193-W  Diagnosis:  Syncope and collapse   Reason for admission:  Fatigue, SOB, LOC   Precautions:  Falls, multiple syncopal episodes   Pertinent PMHx: HTN, CAD, bronchitis, aortic aneurysm, anemia   Procedures: None  Equipment Recommendations:  [employer]    SUBJECTIVE:  Pt lives with son and daughter in a 2 story home with 3 stair(s) to enter and 1 rail(s). Bed is on 1st floor and bath is on 1st floor. Pt ambulated with no AD PTA - owns [employer]. Pt independent for ADL performance. OBJECTIVE:   Initial Evaluation  Date:  Treatment   Short Term/ Long Term   Goals   AM-PAC 6 Clicks 82/20     Was pt agreeable to Eval/treatment? Yes      Does pt have pain? Denies pain     Bed Mobility  Rolling: NT  Supine to sit: NT  Sit to supine: NT  Scooting: NT  -in chair to begin session  Independent    Transfers Sit to stand: Sadiq  Stand to sit: Sadiq  Stand pivot: NT  Independent    Ambulation    15 feet with [employer] Sadiq  And  5 ft with no AD Sadiq    >150 feet with [employer] mod I vs no AD   Stair negotiation: ascended and descended  NT  3 steps with 1 rail Mod I   ROM BUE:  See OT eval   BLE:  WFL     Strength BUE:  See OT eval   BLE:  knee ext 5/5  Ankle DF 5/5  Increase by 1/3 MMT grade    Balance Sitting EOB:  SBA  Dynamic Standing:  Sadiq [employer]  Sitting EOB:  indep  Dynamic Standing:   Mod I [employer]     -Pt is A & O x 3  -Sensation:  unremarkable   -Edema:  unremarkable     Therapeutic Exercises:  functional activity     Patient education  Pt educated on safety, sequencing of transfers, and role of PT    Patient response to education:   Pt verbalized understanding Pt demonstrated skill Pt requires further education in this area   Yes  With assist  Yes      ASSESSMENT:    Comments:  Pt received sitting in bedside chair and agreeable to PT session  Pt requires only light assistance for all mobility. Attempted ambulation with no device but pt unsteady, guarded, and reaching for objects for balance. Provided [employer] which improved pts performance. Gait still shuffled with decreased stride length. Transport cart arriving to take pt to xrat - assisted to cart in hallway. Pt c/o SOB sitting up in bed. Required rest x 2-3 minutes prior to recovering. Left in care of transport staff. Pt with all needs met and call light in reach. Pt would benefit from continued PT POC to address functional deficits described above. Treatment:  Patient practiced and was instructed in the following treatment:     Patient education provided continuously throughout session for sequencing, safety maintenance, and improving any deficits found during the evaluation.  STS and pivot transfer training - pt educated on proper hand and foot placement, safety and sequencing, and use of WW to safely complete sit<>stand and pivot transfers with hands on assistance to complete task safely     Gait training- pt was given verbal and tactile cues to facilitate increased step length and proper use of WW during ambulation as well as provided with physical assistance to complete task. Pt's/ family goals   1. Return home     Patient and or family understand(s) diagnosis, prognosis, and plan of care. Yes     PLAN:    PT care will be provided in accordance with the objectives noted above. Exercises and functional mobility practice will be used as well as appropriate assistive devices or modalities to obtain goals. Patient and family education will also be administered as needed. Frequency of treatments: 2-5x/week x 1-2 weeks.     Time in  0810  Time out  0823    Total Treatment Time 8 minutes     Evaluation Time includes thorough review of current medical information, gathering information on past medical history/social history and prior level of function, completion of standardized testing/informal observation of tasks, assessment of data and education on plan of care and goals.     CPT codes:  [x] Low Complexity PT evaluation 77220  [] Moderate Complexity PT evaluation 81043  [] High Complexity PT evaluation 47476  [] PT Re-evaluation 90620  [] Gait training 96653 -- minutes  [] Manual therapy [id number] -- minutes  [x] Therapeutic activities 16942 8 minutes  [] Therapeutic exercises 84181 -- minutes  [] Neuromuscular reeducation 39704 -- minutes     Valdo Subramanian, PT, DPT  [id number]

## 2020-07-02 NOTE — PROGRESS NOTES
Beny Benavides is a 80 y.o. male patient.     Current Facility-Administered Medications   Medication Dose Route Frequency Provider Last Rate Last Dose    mineral oil-hydrophilic petrolatum (HYDROPHOR) ointment   Topical BID PRN Yissel Montelongo MD        acetaminophen (TYLENOL) tablet 500 mg  500 mg Oral Q6H PRN Yissel Montelongo MD   500 mg at 07/01/20 1504    isosorbide dinitrate (ISORDIL) tablet 10 mg  10 mg Oral TID Delfina Search, DO   10 mg at 07/02/20 1510    spironolactone (ALDACTONE) tablet 25 mg  25 mg Oral Q MWF Collashwin Search, DO   25 mg at 07/01/20 2000    aspirin chewable tablet 81 mg  81 mg Oral Daily Yissel Montelongo MD   81 mg at 07/02/20 1030    ipratropium-albuterol (DUONEB) nebulizer solution 3 mL  3 mL Inhalation Q4H Yissel Montelongo MD   3 mL at 07/02/20 1537    levothyroxine (SYNTHROID) tablet 112 mcg  112 mcg Oral Daily Yissel Montelongo MD   112 mcg at 07/02/20 4591    pantoprazole (PROTONIX) tablet 40 mg  40 mg Oral QAM AC Yissel Montelongo MD   40 mg at 07/02/20 5680    furosemide (LASIX) injection 40 mg  40 mg Intravenous Daily Yissel Montelongo MD   40 mg at 07/02/20 1030    perflutren lipid microspheres (DEFINITY) injection 1.65 mg  1.5 mL Intravenous ONCE PRN Collie Search, DO        carvedilol (COREG) tablet 6.25 mg  6.25 mg Oral BID  Delfina Ward, DO   6.25 mg at 07/02/20 1735    cloNIDine (CATAPRES) tablet 0.1 mg  0.1 mg Oral BID Yissel Montelongo MD   0.1 mg at 07/02/20 1030     Facility-Administered Medications Ordered in Other Encounters   Medication Dose Route Frequency Provider Last Rate Last Dose    iohexol (OMNIPAQUE 240) injection 50 mL  50 mL Oral ONCE PRN Basem A Ashleigh        ioversol (OPTIRAY) 68 % injection 120 mL  120 mL Intravenous ONCE PRN Basem A Ashleigh         Allergies   Allergen Reactions    Reglan [Metoclopramide] Other (See Comments)     tremors    Other      Flu Vaccine  Pneumococcal Vaccines     Active Problems:    Syncope and collapse  Resolved Problems:    * No resolved hospital problems. *    Blood pressure 123/67, pulse 74, temperature 97.7 °F (36.5 °C), temperature source Oral, resp. rate 28, height 5' 6\" (1.676 m), weight 146 lb (66.2 kg), SpO2 96 %. Subjective:  Symptoms:  Stable. No shortness of breath. Diet:  Adequate intake. Activity level: Normal.    Pain:  He reports no pain. Objective:  General Appearance: In no acute distress. Vital signs: (most recent): Blood pressure 123/67, pulse 74, temperature 97.7 °F (36.5 °C), temperature source Oral, resp. rate 28, height 5' 6\" (1.676 m), weight 146 lb (66.2 kg), SpO2 96 %. No fever. Output: Producing urine and producing stool. HEENT: Normal HEENT exam.    Lungs:  Normal effort. Heart: Normal rate. Abdomen: Abdomen is soft. Bowel sounds are normal.   There is no abdominal tenderness. Extremities: Normal range of motion. Pulses: Distal pulses are intact. Neurological: Patient is alert and oriented to person, place and time. Pupils:  Pupils are equal, round, and reactive to light. Assessment:    Condition: In stable condition. (Syncope                Anemia            05/2010             Aneurysm of aorta ([address]       1990             Bronchitis        2014              [facility] no cardiac physicians             CAD (coronary artery disease)                         right ventricular branch of the RCA             Hypertension                Thyroid disease  Bilateral pleural effusion  Plan: off eliquis, possible thoracentesis tomorrow).        Yo Lawson MD  7/2/2020

## 2020-07-03 PROBLEM — E43 SEVERE PROTEIN-CALORIE MALNUTRITION (HCC): Chronic | Status: ACTIVE | Noted: 2020-01-01

## 2020-07-03 NOTE — PROCEDURES
Central Line Insertion     Procedure: right internal jugular vein Triple Lumen Catheter placement. Indications: poor peripheral access    Consent: The family members were counseled regarding the procedure, its indications, risks, potential complications and alternatives, and any questions were answered. Consent was obtained to proceed. Number of sticks: 3    Number of Kits used: 1    Procedure: Time Out: Immediately prior to the procedure a \"timeout\" was called to verify the correct patient and procedure. The patient was place in the trendelenburg position and the skin over the right internal jugular vein was prepped with betadine and draped in a sterile fashion. Local anesthesia was obtained by infiltration using 1% Lidocaine without epinephrine. With Ultrasound guidance a large bore needle was used to identify the vein, dark non pulsatile blood returned. The guide wire was then inserted through the needle with minimal resistance. 2 mm nick was made in the skin beside the guidewire. Then a dilator was inserted and removed. A triple lumen catheter was then inserted into the vessel over the guide wire using the Seldinger technique to the 15 cm xu. All ports showed good, free flowing blood return and were flushed with saline solution. The catheter was then securely fastened to the skin with sutures and covered with a bio patch and sterile dressing. A post procedure X-ray was ordered and is still pending at this time. Complications: None   The patient tolerated the procedure well. Estimated blood loss: 10 ml.     Janel Junior MD PGY-1  7/3/2020  5:23 AM      Supervised by Dr. Tyler Rodriguez MD PGY-3

## 2020-07-03 NOTE — PROGRESS NOTES
Ayesha Feldman is a 80 y.o. male patient.     Current Facility-Administered Medications   Medication Dose Route Frequency Provider Last Rate Last Dose    sodium chloride flush 0.9 % injection 10 mL  10 mL Intravenous 2 times per day Page Baker MD        sodium chloride flush 0.9 % injection 10 mL  10 mL Intravenous PRN Page Baker MD        acetaminophen (TYLENOL) tablet 650 mg  650 mg Oral Q6H PRN Page Baker MD        Or    acetaminophen (TYLENOL) suppository 650 mg  650 mg Rectal Q6H PRN Page Baker MD        polyethylene glycol (GLYCOLAX) packet 17 g  17 g Oral Daily PRN Page Baker MD        promethazine (PHENERGAN) tablet 12.5 mg  12.5 mg Oral Q6H PRN Page Baker MD        Or    ondansetron [facility]) injection 4 mg  4 mg Intravenous Q6H PRN Page Baker MD        pantoprazole (PROTONIX) injection 40 mg  40 mg Intravenous Daily Page Baker MD        And    sodium chloride (PF) 0.9 % injection 10 mL  10 mL Intravenous Daily Page Baker MD        norepinephrine (LEVOPHED) 16 mg in dextrose 5% 250 mL infusion  10 mcg/min Intravenous Continuous Page Baker MD 4.7 mL/hr at 07/03/20 0440 5 mcg/min at 07/03/20 0440    heparin (porcine) injection 5,000 Units  5,000 Units Subcutaneous 3 times per day Page Baker MD   5,000 Units at 07/03/20 5404    propofol injection  10 mcg/kg/min Intravenous Titrated Page Baker MD 4 mL/hr at 07/03/20 0609 10 mcg/kg/min at 07/03/20 4519    mineral oil-hydrophilic petrolatum (HYDROPHOR) ointment   Topical BID PRN Munir Grigsby MD        isosorbide dinitrate (ISORDIL) tablet 10 mg  10 mg Oral TID Scot Moulding, DO   10 mg at 07/02/20 2119    spironolactone (ALDACTONE) tablet 25 mg  25 mg Oral Q MWF Scot Moulding, DO   25 mg at 07/01/20 2000    aspirin chewable tablet 81 mg  81 mg Oral Daily Munir Grigsby MD   81 mg at 07/02/20 1030    cardio eval, was to have thoracentesis  Fritz Leblanc DO  7/3/2020

## 2020-07-03 NOTE — PLAN OF CARE
Problem: Restraint Use - Nonviolent/Non-Self-Destructive Behavior:  Goal: Absence of restraint indications  Description: Absence of restraint indications  Outcome: Not Met This Shift     Problem: Restraint Use - Nonviolent/Non-Self-Destructive Behavior:  Goal: Absence of restraint-related injury  Description: Absence of restraint-related injury  Outcome: Met This Shift

## 2020-07-03 NOTE — PROGRESS NOTES
This nurse was called to patient's room by roommate's 'sitter' needing assistance with patient getting back into bed, per the sitter. Upon arrival to room, patient was found to be unresponsive with no pulse. A code blue was called and CPR was initiated by this nurse.      Electronically signed by Анна Park RN on 7/3/2020 at 3:24 AM

## 2020-07-03 NOTE — PROCEDURES
Procedure Note  Thoracentesis     Flip Ashford     [id number]     7/3/2020    Indication: Flip Ashford 80 y.o. male with Pleural Effusion found on imaging. Pre - Operative Diagnosis:   left Pleural Effusion    Post - Operative Diagnosis:  same    Performed by: Marilyn Pike MD, PGY-3, attending Dr Kalani Mckinney: Nurse    Consent:  Verbal consent obtained. Written consent obtained. After informed consent & appropriate time out protocol was noted & obtained. Risks/Benefits/Alternatives of the procedure were discussed including: infection, bleeding, pain, & pneumothorax. Procedure Details:  Patient understanding: patient states understanding of the procedure being performed. Time out: Immediately prior to procedure a \"time out\" was called to verify the correct. Patient was made up in sitting position and ultrasound was done and site of maximum fluid collection was marked. Preparation: Patient was prepped and draped in the usual sterile fashion. Local anesthesia used: Yes    Local anesthetic: Lidocaine 1% without epinephrine    TECHNIQUE: The patient was placed in the sitting position. The skin was prepped and draped with Chloraprep solution and sterile drapes were utilized. 1% buffered lidocaine was used to anesthetize the skin, subcutaneous tissue and parietal pleura. Then the plural catheter was advanced into the pleural space. The fluid was obtained without any difficulties and minimal blood loss. A total of 1200 fluid was removed. The fluid was serosanguinous reddish in color. A dressing was applied to the incision area. The fluid was sent for analysis. Findings  We removed 1000 ml of clear reddish pleural fluid. A sample was sent to Pathology for cytogenetics, flow, and cell counts, as well as for infection Analysis if needed. Typical Laboratory Test Sent;  1. ABG syringe: pH  2. Red Top:   LDH, Glucose, total Protein, Albumin, Cholesterol, CEA &     Triglycerides  3.   Purple Top:  Cell count and Diff  4. Bottle/Cup #1:  Gram Stain with C&S, AFB smear & cultures, & KOH stain     and Fungal cultures. 6.  Bottle/Cup #2 :  Cytology    Complications:  None; patient tolerated the procedure well. Condition: stable    Plan: Chest Xray post procedure ordered to rule out pneumothorax. Follow up on testing studies    Alexander Harry MD, PGY-3  7/3/2020 2:33 PM    Attending: Dr. Eric Groves I was present to provide direct supervision for the above services.     Russ Garza MD  7/4/2020 1:30 PM

## 2020-07-03 NOTE — PROGRESS NOTES
Net -727 ml     CURRENT PULSE OXIMETRY:  SpO2: 100 %  24HR PULSE OXIMETRY RANGE:  SpO2  Av.4 %  Min: 92 %  Max: 100 %  CVP:    VENT SETTINGS:   Vent Information  $Ventilation: $Initial Day  Equipment ID: [id number]  Vent Type: 980  Vent Mode: AC/VC  Vt Ordered: 380 mL  Rate Set: 22 bmp  Peak Flow: 70 L/min  Pressure Support: 0 cmH20  FiO2 : 80 %  SpO2: 100 %  SpO2/FiO2 ratio: 125  Sensitivity: 3  PEEP/CPAP: 10  I Time/ I Time %: 0 s  Humidification Source: Heated wire  Humidification Temp: 37  Humidification Temp Measured: 37  Additional Respiratory  Assessments  Pulse: 97  Resp: 20  SpO2: 100 %  Position: Semi-Gregory's  Humidification Source: Heated wire  Humidification Temp: 37  Oral Care: Suction toothette, Mouth suctioned      EXAM:  General: sedated. Eyes: PERRL. No sclera icterus. No conjunctival injection. ENT: ET in place  Neck: Trachea midline. Normal thyroid. Resp: No accessory muscle use. No crackles. No wheezing. No rhonchi. Decreased breath sounds at the bases  CV: Regular rate. Regular rhythm. No mumur or rub. + edema. ABD: Non-tender. Non-distended. No masses. No organmegaly. Normal bowel sounds. Skin: Warm and dry. No nodule on exposed extremities. No rash on exposed extremities. Lymph: No cervical LAD. No supraclavicular LAD. M/S: No cyanosis. No joint deformity. No clubbing. Neuro: RASS -1 to -2    I/O: I/O last 3 completed shifts: In: 1800 [P.O.:840; I.V.:960]  Out:  [Urine:1797; Emesis/NG output:200; Blood:100]  I/O this shift:   In: 0   Out: 430 [Urine:430]     Results:  CBC:   Recent Labs     20  0450 20   WBC 6.4 7.5   HGB 8.8* 8.1*   HCT 28.9* 26.2*   MCV 83.0 83.2    162     BMP:   Recent Labs     20  0207 20  0347 20  0834   NA  --  140 142   K 5.81* 4.1 4.2   CL  --  102 101   CO2  --  24 28   BUN  --  66* 64*   CREATININE  --  1.9* 1.8*     PT/INR:   Recent Labs     20  0347   PROTIME 17.4*   INR 1.5 Cultures:  -    ABG: noted if resulted  Films:  CXR : worse B pleural effusions and CHF  CT: none     Assessment:  · -#1  Bilateral pleural effusions. Most likely associated to congestive heart failure, unfortunately worse. started on Lasix gtt. Pig tail cath to be placed by CCM  · #2 Resp Failure, s/p Cardiac arrest this am. Full MVS as per CCM  · D/w daughter at bedside and Radha Leblanc M.D., F.C.C.P.

## 2020-07-03 NOTE — PROGRESS NOTES
Diet Orders: NPO     · Anthropometric Measures:  · Ht: 5' 6\" (167.6 cm)   · Current Body Wt: 146 lb (66.2 kg)(7/3 actual )  · Admission Body Wt: 146 lb (66.2 kg)(7/2 first measured )  · Usual Body Wt: 137 lb (62.1 kg)(7/2019 EMR actual 1 year ago)  · % Weight Change:  EMR reflects ~10lb wt gain x 1 year  · Ideal Body Wt: 142 lb (64.4 kg), % Ideal Body 103%  · BMI Classification: BMI 18.5 - 24.9 Normal Weight    Nutrition Interventions:   Continued Inpatient Monitoring, Education not appropriate at this time, Coordination of Care    Nutrition Evaluation:   · Evaluation: Goals set   · Goals: Nutrition progression when medically appropriate     · Monitoring: Nutrition Progression, Skin Integrity, Wound Healing, I&O, Mental Status/Confusion, Weight, Pertinent Labs, Monitor Bowel Function, Monitor Hemodynamic Status      Electronically signed by Yasmany Glover RD, LD on 7/3/20 at 2:43 PM EDT    Contact Number: Ext [phone]

## 2020-07-03 NOTE — CONSULTS
[facility]  Internal Medicine Residency Program  History and Physical  MICU    Patient:  Ami Sherman 80 y.o. male MRN: [id number]     Date of Service: 7/3/2020    Hospital Day: 5      Chief complaint: syncope   History of Present Illness   The patient is a 80 y.o. male with a history of Anemia, Aortic Anuerysm, CAD, HPN, Thyroid disease. The patient has been noted to experience persistent fatigue and dyspnea hence consulted with his internist. While in the clinic, his daughter witnessed the patient lose consciousness and slump on his chair. The patient was then immediately brought to the emergency department. Workup showed elevated Troponin (0.09), D-Dimer (972) and BNP. He also had Pleural effusion on x-ray. He was admitted under the care of his primary internist and was subsequently referred to cardiology for workup of his syncopal episode. Cardiology ordered a Lung perfusion scan and doppler US of the lower extremity to investigate the elevated d-dimer  He was negative for lower extremity thrombi. Patient was also referred to Pulmonology for the pleural effusion. Over the course of his hospital stay patient was stable but had fatigue. On his 5th day of admission the patient was found by his nurse having difficult of breathing until he eventually became unresponsive with no pulse hence code blue was called. Chest compressions were immediately started. ROSC was achieved after 6 minutes. Medications given include: 1 mg epinephrine, 1 gm Cacl, 10 units Insulin, half amp of d50, 50 mEq bicarb, 2 versed.  ABG obtained revealed: pH 7.19, CO2 75.6, O2 56.3, HCO3 28.7 K 5.8 Patient was intubated and transferred to the ICU         Past Medical History:      Diagnosis Date    Anemia 05/2010    Aneurysm of aorta [address] [address] 1990    Bronchitis 2014    [facility] no cardiac physicians    CAD (coronary artery disease)     right ventricular branch of the RCA    Hypertension     Thyroid disease mouth daily    Historical Provider, MD       Allergies:  Reglan [metoclopramide] and Other    Social History:   TOBACCO:   reports that he quit smoking about 46 years ago. His smoking use included cigarettes. He has a 5.00 pack-year smoking history. He has never used smokeless tobacco.  ETOH:   reports no history of alcohol use. Family History:       Family history unknown: Yes       REVIEW OF SYSTEMS:    No review was done due to patient being unresponsive     Physical Exam   · Vitals: /83   Pulse 91   Temp 94.5 °F (34.7 °C) (Bladder)   Resp 14   Ht 5' 6\" (1.676 m)   Wt 146 lb 6.4 oz (66.4 kg)   SpO2 93%   BMI 23.63 kg/m²   ·    ·      Physical Exam  HENT:      Head: Normocephalic and atraumatic. Neck:      Musculoskeletal: Neck supple. Cardiovascular:      Rate and Rhythm: Normal rate. Rhythm irregular. Pulmonary:      Breath sounds: Decreased breath sounds present. Abdominal:      General: Abdomen is flat. Bowel sounds are normal.      Palpations: Abdomen is soft. Skin:     General: Skin is warm.            Lines     site day    Art line   None    TLC R IJ 1   PICC None    Hemoaccess None    Oxygen:       Mechanical Ventilation:   Mode: A/C   low tidal   TV:450 ml RR: 16  PEEP 5.0 cmH2O FiO2 100%    ABG:     Lab Results   Component Value Date    PH 7.197 07/03/2020    PCO2 75.6 07/03/2020    PO2 56.3 07/03/2020    HCO3 28.7 07/03/2020    BE -0.6 07/03/2020    THB 10.5 07/03/2020    O2SAT 77.1 07/03/2020     Labs and Imaging Studies   Basic Labs  CBC:   Lab Results   Component Value Date    WBC 7.5 07/03/2020    RBC 3.15 07/03/2020    HGB 8.1 07/03/2020    HCT 26.2 07/03/2020    MCV 83.2 07/03/2020    RDW 21.3 07/03/2020     07/03/2020     BMP:    Lab Results   Component Value Date     07/03/2020    K 4.1 07/03/2020    K 3.0 08/29/2019     07/03/2020    CO2 24 07/03/2020    BUN 66 07/03/2020     CMP:  Lab Results   Component Value Date     07/03/2020    K 4.1 2020    K 3.0 2019     2020    CO2 24 2020    BUN 66 2020    PROT 5.7 2020     TSH:    Lab Results   Component Value Date    TSH 12.360 2020       Imaging Studies:     Xr Chest Standard (2 Vw)    Result Date: 2020  Patient MRN: [id number] : 1929 Age:  80 years Gender: Male Order Date: 2020 7:00 AM Exam: XR CHEST (2 VW) Indication:   pleural effusion pleural effusion Comparison: Chest PA and lateral, 2020 FINDINGS: Moderate right and large left pleural effusions are seen. The heart borders are obscured by the surrounding effusions. The upper lungs are clear and without any significant pulmonary vascular congestion. No pneumothorax is seen. Moderate right and large left pleural effusions. Xr Chest Standard (2 Vw)    Result Date: 2020  Patient MRN: [id number] : 1929 Age:  80 years Gender: Male Order Date: 2020 5:15 PM Exam: XR CHEST (2 VW) Number of Images: 2 view Indication:   sob sob Comparison: 2019 Findings: There is a suggestion of bilateral pleural effusions The heart is enlarged. Lung fields demonstrate patchy bilateral infiltrates which could be related to pulmonary edema. The aorta is tortuous and ectatic     Cardiomegaly Tortuous aorta There are patchy infiltrates seen throughout both the lung fields. Pulmonary edema could have this appearance There are bilateral pleural effusions present. Nm Lung Scan Perfusion Only    Result Date: 2020  Patient MRN: [id number] : 1929 Age:  80 years Gender: Male Order Date: 2020 10:00 AM Exam: NM LUNG SCAN PERFUSION ONLY Number of Images:   2  views Indication:   syncope and elevated d dimer syncope and elevated d dimer Comparison: 2020 Findings: The patient received 7.9 millicuries of technetium MAA. The chest radiograph demonstrates what appears to be large bilateral infiltrates and effusions.  There is relatively limited aeration of the upper lobes Perfusion images reveal segmental perfusion defects. There is a defect in the inferior right lobe possibly pleural effusion and infiltrate. There is a defect in the left lower lobe again possibly effusion and infiltrate     Segmental perfusion defects, in the left lower lobe and right lower lobe. Possible defects from infiltrate and effusions, given the appearance of the chest x-ray. Pulmonary embolism cannot be excluded. ALERT:  THIS IS AN ABNORMAL REPORT    Us Dup Lower Extremities Bilateral Venous    Result Date: 2020  Patient MRN:  [id number] : 1929 Age: 80 years Gender: Male Order Date:  2020 6:45 PM EXAM: US DUP LOWER EXTREMITIES BILATERAL VENOUS INDICATION:  Elevated d dimer syncope concern for DVT leg pain Elevated d dimer syncope concern for DVT leg pain  COMPARISON: None FINDINGS: Normal color flow is seen in the veins, which demonstrate normal compressibility and augmentation. No evidence of deep venous thrombosis. Resident's Assessment and Plan     Paramjit Saleh is a 80 y.o. male       1. Pulmonology    Acute hypoxic hypercapnic respiratory failure 2/2 to pleural effusion likely 2/2 to  congestive heart failure    Labs: ABG showed Respiratory acidosis, X-ray showed pleural effusions     Plan: Intubation continue on ventilation and adjust accordingly     2. Cardiology    Congestive Heart failure    Labs: elevated BNP, Troponin     Plan: Follow-up on 2D Echo       3. Renal:    Acute Kidney Injury likely 2/2 to diuresis vs. Chronic Kidney Disease    Labs: elevated Creatinine    Plan: monitor Creatinine levels if persistent, refer to Nephrology      3. Endocrinology    Hypothyroidism    Labs: elevated TSH (12.360)   Plan: recheck free T3 and T4, possible adjustment of synthroid      4. Hematology   Chronic Anemia    Labs: Dec Hgb, Hematocrit MCH MCHC    Plan: continue to monitor CBC            DVT prophylaxis: Eliquis   GI prophylaxis: Protonix   Disposition: MUKESH Moscoso Mitchel Bowen MD, PGY-1   Attending physician: Dr. Mariah Johnson     I personally saw, examined and provided care for the patient. Radiographs, labs and medication list were reviewed by me independently. I spoke with bedside nursing, therapists and consultants. Critical care services and times documented are independent of procedures and multidisciplinary rounds with Residents. Additionally comprehensive, multidisciplinary rounds were conducted with the MICU team. The case was discussed in detail and plans for care were established. Review of Residents documentation was conducted and revisions were made as appropriate. I agree with the above documented exam, problem list and plan of care with the following additions:    Admitted to the ICU after PEA arrest on the floor  Originally presented with syncope  Intubated although responsive and able to follow commands therefore hypothermia protocol was not needed  Very hypoxic today with extensive pulmonary edema/pleural effusions  Vent adjusted  Sedatives/paralytic  Echo with EF 38%  S/p L thoracentesis today with removal of 1200cc fluid - pleural analysis sent  Placed on lasix gtt - consult renal service  Empiric antibiotics and cultures   Discussed case with Dr. Sherlene Lesch, Dr. Shadi Tinajero, daughter    48 minutes of CCT spent with the patient, reviewing the chart including imaging studies, and discussing the case with other health care professionals. This time excludes procedures.      Yohannes Briceño MD

## 2020-07-03 NOTE — PROGRESS NOTES
/72   Pulse 71   Temp 97.8 °F (36.6 °C) (Temporal)   Resp 20   Ht 5' 6\" (1.676 m)   Wt 146 lb (66.2 kg)   SpO2 98%   BMI 23.57 kg/m²   Patient Vitals for the past 96 hrs (Last 3 readings):   Weight   07/02/20 1015 146 lb (66.2 kg)   06/29/20 1513 145 lb (65.8 kg)     OBJECTIVE:    HEENT: PERRL, EOM  Intact; sclera non-icteric, conjunctiva pink. Carotids are brisk in upstroke with normal contour. No carotid bruits. Normal jugular venous pulsation at 45°. No palpable cervical nor supraclavicular nodes. Thyroid not palpable. Trachea midline. Chest: Even excursion  Lungs: CTA B, no expiratory wheezes or rhonchi, no decreased tactile fremitus without inspiratory rales. Heart: Regular  rhythm; S1 > S2, no gallop or murmur. No clicks, rub, palpable thrills   or heaves. PMI nondisplaced, 5th intercostal space MCL. Abdomen: Soft, nontender, nondistended,  mildly protuberant, no masses or organomegaly. Bowel sounds active. Extremities: Without clubbing, cyanosis or edema. Pulses present 3+ upper extermities bilaterally; present 1+ DP and present 1+ PT bilaterally. Data:   Scheduled Meds: Reviewed  Continuous Infusions:     Intake/Output Summary (Last 24 hours) at 7/2/2020 2325  Last data filed at 7/2/2020 1839  Gross per 24 hour   Intake 900 ml   Output 1225 ml   Net -325 ml     CBC:   Recent Labs     06/30/20  0943 07/01/20  0450   WBC 6.0 6.4   HGB 9.8* 8.8*   HCT 32.3* 28.9*    195     BMP:  Recent Labs     06/30/20  0943      K 4.4   CL 99   CO2 26   BUN 59*   CREATININE 1.8*   LABGLOM 43     ABGs:   Lab Results   Component Value Date    PH 7.388 06/29/2020    PO2 94.0 06/29/2020    PCO2 46.8 06/29/2020     INR: No results for input(s): INR in the last 72 hours.   PRO-BNP:   Lab Results   Component Value Date    PROBNP 34,147 (H) 06/29/2020    PROBNP 14,177 (H) 08/29/2019      TSH: Lab Results   Component Value Date    TSH 6.300 (H) 2019      Cardiac Injury Profile:   No results for input(s): CKTOTAL, CKMB, TROPONINI in the last 72 hours. Lipid Profile: No results found for: TRIG, HDL, LDLCALC, CHOL   Hemoglobin A1C: No components found for: HGBA1C     RAD:   Xr Chest Standard (2 Vw)    Result Date: 2020  Patient MRN: [id number] : 1929 Age:  80 years Gender: Male Order Date: 2020 5:15 PM Exam: XR CHEST (2 VW) Number of Images: 2 view Indication:   sob sob Comparison: 2019 Findings: There is a suggestion of bilateral pleural effusions The heart is enlarged. Lung fields demonstrate patchy bilateral infiltrates which could be related to pulmonary edema. The aorta is tortuous and ectatic     Cardiomegaly Tortuous aorta There are patchy infiltrates seen throughout both the lung fields. Pulmonary edema could have this appearance There are bilateral pleural effusions present. Nm Lung Scan Perfusion Only    Result Date: 2020  Patient MRN: [id number] : 1929 Age:  80 years Gender: Male Order Date: 2020 10:00 AM Exam: NM LUNG SCAN PERFUSION ONLY Number of Images:   2  views Indication:   syncope and elevated d dimer syncope and elevated d dimer Comparison: 2020 Findings: The patient received 7.9 millicuries of technetium MAA. The chest radiograph demonstrates what appears to be large bilateral infiltrates and effusions. There is relatively limited aeration of the upper lobes Perfusion images reveal segmental perfusion defects. There is a defect in the inferior right lobe possibly pleural effusion and infiltrate. There is a defect in the left lower lobe again possibly effusion and infiltrate     Segmental perfusion defects, in the left lower lobe and right lower lobe. Possible defects from infiltrate and effusions, given the appearance of the chest x-ray. Pulmonary embolism cannot be excluded.  ALERT:  THIS IS AN ABNORMAL REPORT    Us Dup Lower

## 2020-07-03 NOTE — SIGNIFICANT EVENT
Code blue note    Code yina was called at 0155    On arrival, pt was unresponsive, receiving CPR. ACLS protocol was initiated and the following was given:  Adrenaline: 1mg   Atropine:  None   Calcium cl: 1gr  Sodium bicarbonate: 50mEq  D50: 12.5 gr    Patient intubated and placed on ventilator. Central line inserted per team members in ICU. ABG obtained revealed: pH 7.19, CO2 75.6, O2 56.3, HCO3 28.7 K 5.8    We continued CPR for 6bmin, and pt was revived and transferred to the ICU. Family, PCP and Intensives were updated.     Camilla Meyer MD  Internal Medicine resident PGY-3  7/3/2020  2:31 AM

## 2020-07-03 NOTE — PROGRESS NOTES
0155 pt unreponsive, no pulse, code blue called and compressions started  0158 pulse check, no pulse, compressions resumed  0159 1 epi given  0201 pulse check, ROSC  0206 intubated  0215 1 cacl  0217 10 regular insulin, half amp of d50  0218 1 amp bicarb  0232 2 versed  0233 Pt transferred to [facility]

## 2020-07-03 NOTE — CONSULTS
08/2011,10/2011    COLONOSCOPY  01/2016    DIAGNOSTIC CARDIAC CATH LAB PROCEDURE  08/2004    HEMORRHOID SURGERY      TRANSESOPHAGEAL ECHOCARDIOGRAM  07/30/2019    Dr Sonny Hardin       Current Medications:    Current Facility-Administered Medications: sodium chloride flush 0.9 % injection 10 mL, 10 mL, Intravenous, 2 times per day  sodium chloride flush 0.9 % injection 10 mL, 10 mL, Intravenous, PRN  acetaminophen (TYLENOL) tablet 650 mg, 650 mg, Oral, Q6H PRN **OR** acetaminophen (TYLENOL) suppository 650 mg, 650 mg, Rectal, Q6H PRN  polyethylene glycol (GLYCOLAX) packet 17 g, 17 g, Oral, Daily PRN  promethazine (PHENERGAN) tablet 12.5 mg, 12.5 mg, Oral, Q6H PRN **OR** ondansetron (ZOFRAN) injection 4 mg, 4 mg, Intravenous, Q6H PRN  pantoprazole (PROTONIX) injection 40 mg, 40 mg, Intravenous, Daily **AND** sodium chloride (PF) 0.9 % injection 10 mL, 10 mL, Intravenous, Daily  norepinephrine (LEVOPHED) 16 mg in dextrose 5% 250 mL infusion, 10 mcg/min, Intravenous, Continuous  propofol injection, 10 mcg/kg/min, Intravenous, Titrated  furosemide (LASIX) 100 mg in sodium chloride 0.9 % 100 mL infusion, 5 mg/hr, Intravenous, Continuous  piperacillin-tazobactam (ZOSYN) 3.375 g in dextrose 5 % 100 mL IVPB extended infusion (mini-bag), 3.375 g, Intravenous, Q8H **AND** 0.9 % sodium chloride infusion admixture, , Intravenous, Q8H  [Held by provider] apixaban (ELIQUIS) tablet 2.5 mg, 2.5 mg, Oral, BID  midazolam (VERSED) injection 2 mg, 2 mg, Intravenous, Q4H PRN  cisatracurium besylate (NIMBEX) 200 mg in sodium chloride 0.9 % 100 mL infusion, 2 mcg/kg/min, Intravenous, Continuous  fentaNYL 5 mcg/ml in 0.9%  ml infusion, 25 mcg/hr, Intravenous, Continuous  mineral oil-hydrophilic petrolatum (HYDROPHOR) ointment, , Topical, BID PRN  isosorbide dinitrate (ISORDIL) tablet 10 mg, 10 mg, Oral, TID  [Held by provider] spironolactone (ALDACTONE) tablet 25 mg, 25 mg, Oral, Q MWF  aspirin chewable tablet 81 mg, 81 mg, Oral, Daily  ipratropium-albuterol (DUONEB) nebulizer solution 3 mL, 3 mL, Inhalation, Q4H  levothyroxine (SYNTHROID) tablet 112 mcg, 112 mcg, Oral, Daily  perflutren lipid microspheres (DEFINITY) injection 1.65 mg, 1.5 mL, Intravenous, ONCE PRN  carvedilol (COREG) tablet 6.25 mg, 6.25 mg, Oral, BID WC  cloNIDine (CATAPRES) tablet 0.1 mg, 0.1 mg, Oral, BID  Facility-Administered Medications Ordered in Other Encounters: iohexol (OMNIPAQUE 240) injection 50 mL, 50 mL, Oral, ONCE PRN  ioversol (OPTIRAY) 68 % injection 120 mL, 120 mL, Intravenous, ONCE PRN    Allergies:  Reglan [metoclopramide] and Other      Family History:     Family History   Family history unknown: Yes       Review of Systems:      Sedated and intubated.      Physical exam:   Constitutional:  VITALS:  BP 98/60   Pulse 86   Temp 96.4 °F (35.8 °C)   Resp 21   Ht 5' 6\" (1.676 m)   Wt 146 lb 6.4 oz (66.4 kg)   SpO2 92%   BMI 23.63 kg/m²   CURRENT TEMPERATURE:  Temp: 96.4 °F (35.8 °C)  CURRENT RESPIRATORY RATE:  Resp: 21  CURRENT PULSE:  Pulse: 86  CURRENT BLOOD PRESSURE:  BP: 98/60  24HR BLOOD PRESSURE RANGE:  Systolic (04RBT), SOX:057 , Min:87 , PWK:692   ; Diastolic (27ZOE), NLN:82, Min:50, Max:100    24HR INTAKE/OUTPUT:      Intake/Output Summary (Last 24 hours) at 7/3/2020 1110  Last data filed at 7/3/2020 0800  Gross per 24 hour   Intake 1560 ml   Output 2097 ml   Net -537 ml     Gen: intubated and sedated  Skin: no rash, turgor wnl  Heent: intubated   Neck: no jvd noted  Cardiovascular:  S1, S2 no S3 or rub  Respiratory: clear upper, diminished in bases  Abdomen:  +bs, soft, nt, nd  Ext: trace to 1+ edema bilaterally  Psychiatric: mood and affect appropriate  Musculoskeletal:  Rom, muscular strength intact    DATA:      CBC with Differential:    Lab Results   Component Value Date    WBC 7.5 07/03/2020    RBC 3.15 07/03/2020    HGB 8.1 07/03/2020    HCT 26.2 07/03/2020     07/03/2020    MCV 83.2 07/03/2020    MCH 25.7 07/03/2020    MCHC 30.9 2020    RDW 21.3 2020    LYMPHOPCT 2.8 2020    PROMYELOPCT 1.7 2019    MONOPCT 4.9 2020    BASOPCT 0.3 2020    MONOSABS 0.37 2020    LYMPHSABS 0.21 2020    EOSABS 0.02 2020    BASOSABS 0.02 2020     CMP:    Lab Results   Component Value Date     2020    K 4.2 2020    K 3.0 2019     2020    CO2 28 2020    BUN 64 2020    CREATININE 1.8 2020    GFRAA 43 2020    LABGLOM 43 2020    GLUCOSE 96 2020    PROT 6.0 2020    LABALBU 3.0 2020    CALCIUM 9.4 2020    BILITOT 1.1 2020    ALKPHOS 74 2020    AST 33 2020    ALT 16 2020       RAD:  Xr Chest Standard (2 Vw)    Result Date: 2020  Patient MRN: [id number] : 1929 Age:  80 years Gender: Male Order Date: 2020 5:15 PM Exam: XR CHEST (2 VW) Number of Images: 2 view Indication:   sob sob Comparison: 2019 Findings: There is a suggestion of bilateral pleural effusions The heart is enlarged. Lung fields demonstrate patchy bilateral infiltrates which could be related to pulmonary edema. The aorta is tortuous and ectatic     Cardiomegaly Tortuous aorta There are patchy infiltrates seen throughout both the lung fields. Pulmonary edema could have this appearance There are bilateral pleural effusions present. Nm Lung Scan Perfusion Only    Result Date: 2020  Patient MRN: [id number] : 1929 Age:  80 years Gender: Male Order Date: 2020 10:00 AM Exam: NM LUNG SCAN PERFUSION ONLY Number of Images:   2  views Indication:   syncope and elevated d dimer syncope and elevated d dimer Comparison: 2020 Findings: The patient received 7.9 millicuries of technetium MAA. The chest radiograph demonstrates what appears to be large bilateral infiltrates and effusions. There is relatively limited aeration of the upper lobes Perfusion images reveal segmental perfusion defects.  There is a defect in the inferior right lobe possibly pleural effusion and infiltrate. There is a defect in the left lower lobe again possibly effusion and infiltrate     Segmental perfusion defects, in the left lower lobe and right lower lobe. Possible defects from infiltrate and effusions, given the appearance of the chest x-ray. Pulmonary embolism cannot be excluded. ALERT:  THIS IS AN ABNORMAL REPORT    Us Dup Lower Extremities Bilateral Venous    Result Date: 2020  Patient MRN:  [id number] : 1929 Age: 80 years Gender: Male Order Date:  2020 6:45 PM EXAM: US DUP LOWER EXTREMITIES BILATERAL VENOUS INDICATION:  Elevated d dimer syncope concern for DVT leg pain Elevated d dimer syncope concern for DVT leg pain  COMPARISON: None FINDINGS: Normal color flow is seen in the veins, which demonstrate normal compressibility and augmentation. No evidence of deep venous thrombosis. Assessment/Plan    1. Acute kidney injury on chronic kidney disease stage 3-  He is just above baseline Scr of 1.5-1.6  Peak Scr 2.0 on  and has trended to 1.8-1.9 since that time. Non-oliguric with UOP of 1797 last 24 hours- on lasix drip  Did have some noted hypotension with SBP in the 80-90 range overnight     2. Hypertension with CKD I-IV-  With some noted hypotension  On pressors     3. Bilateral pleural effusions-  On lasix drip follow UOP closely    4. Anemia of chronic disease-  Hgb 8.1- on zosyn  Follow and transfuse for Hgb <7.0    5. Respiratory failure-  Pulmonology following  Intubated and sedated.            Thank you for allowing us to participate in care of CRUZ Zaman CNP  7/3/2020  11:10 AM       Patient was seen and examined by me in the medical intensive care unit  Case was discussed with Tamara Ann nurse practitioner who I reviewed her note and I agreed on its contents  Case was discussed with the patient's daughter at the bedside and with the patient's nurse at the bedside    I was paged 10:56 AM for consultation regarding creatinine of 1.8    The patient is currently intubated, on mechanical ventilation, on propofol and fentanyl drips for sedation and on norepinephrine drip for blood pressure support he is also on IV Lasix drip he has Sacnhez catheter placed with good urine output he is not on any nutrition    Acute kidney injury on top of stage III chronic kidney disease with baseline serum creatinine 1.3-1.5 reflecting decreased renal perfusion with hypotension requiring intravenous norepinephrine also reflecting the hemodynamic effect of HFrEF with possible cardiorenal syndrome. At this point, blood pressure is stable, urine output is adequate and patient is diuresing on Lasix drip and creatinine is stable. I discussed the probability of worsening kidney function over the coming days with the patient's daughter at the bedside who is familiar with dialysis she told me her  had been on dialysis. I explained to her that there is no indication to consider renal replacement therapy at this point. The patient is currently n.p.o. not receiving any free water he may develop hypernatremia for that reason I will keep him on D5W IV fluid at a low rate to supplement some free water while n.p.o. Discontinue clonidine    Anemia: Could be related to chronic kidney disease. Transfuse as needed. Patient is on Eliquis which is currently on hold    Persistent history of proteinuria: Check  UPEP and SPEP    ECHO 7/03/2020   Left ventricle grossly normal in size. Mild left ventricular concentric hypertrophy noted. Prominent papillary muscle. Global hypokinesis is noted to be moderate. Estimated left ventricular ejection fraction is 38±5%. Diastolic function could not be accurately assessed. The LAESV Index is >34 ml/m2. Mild-moderately dilated right ventricle. TAPSE decreased 0.8. Right ventricle global systolic function is moderately reduced .    Physiologic and/or trace mitral regurgitation is present. Trace aortic regurgitation is noted. Mild tricuspid regurgitation. RVSP is 54 mmHg. Pulmonary hypertension is moderate   Physiologic and/or trace pulmonic regurgitation present. Technically good quality study. Compared to prior echo, changes noted. Suggest clinical correlation.      Gene Richard

## 2020-07-03 NOTE — PROGRESS NOTES
Intubation Record    Date: 07/03  Time: 0206  Patient identity confirmed: yes  Indications: code blue  Preoxygenation: BVM with 100% O2   Laryngoscope size and type: C-Mac D-Blade  Airway introducer used: yes  Evac: yes  Tube size: 8  Number of attempts :1  Cords visualized:  [x]Clearly, frothy sputum present [] Poorly   Breath sounds present bilaterally:yes  ETCO2 42  ETT to lip: 23  Tube secured with: Commercial morales  Chest x-ray ordered: yes  Difficulties encounterered: secretions present on intubation, frothy sputum  Difficult Airway: no          Iman Keith, RRT

## 2020-07-03 NOTE — PLAN OF CARE
Problem: Restraint Use - Nonviolent/Non-Self-Destructive Behavior:  Goal: Absence of restraint-related injury  Description: Absence of restraint-related injury  7/3/2020 1828 by Maya Chou RN  Outcome: Met This Shift     Problem: Restraint Use - Nonviolent/Non-Self-Destructive Behavior:  Goal: Absence of restraint indications  Description: Absence of restraint indications  7/3/2020 1828 by Maya Chou RN  Outcome: Not Met This Shift

## 2020-07-04 NOTE — PLAN OF CARE
Problem: Falls - Risk of:  Goal: Will remain free from falls  Description: Will remain free from falls  Outcome: Met This Shift     Problem: Falls - Risk of:  Goal: Absence of physical injury  Description: Absence of physical injury  Outcome: Met This Shift     Problem: Skin Integrity:  Goal: Absence of new skin breakdown  Description: Absence of new skin breakdown  Outcome: Met This Shift     Problem: Restraint Use - Nonviolent/Non-Self-Destructive Behavior:  Goal: Absence of restraint-related injury  Description: Absence of restraint-related injury  7/4/2020 0228 by Dante Aggarwal RN  Outcome: Met This Shift     Problem: Restraint Use - Nonviolent/Non-Self-Destructive Behavior:  Goal: Absence of restraint indications  Description: Absence of restraint indications  7/4/2020 0228 by Dante Aggarwal RN  Outcome: Not Met This Shift

## 2020-07-04 NOTE — PLAN OF CARE
Problem: Skin Integrity:  Goal: Absence of new skin breakdown  Description: Absence of new skin breakdown  Outcome: Met This Shift     Problem: Musculor/Skeletal Functional Status  Goal: Absence of falls  Outcome: Met This Shift     Problem: Restraint Use - Nonviolent/Non-Self-Destructive Behavior:  Goal: Absence of restraint-related injury  Description: Absence of restraint-related injury  Outcome: Met This Shift     Problem: Skin Integrity:  Goal: Will show no infection signs and symptoms  Description: Will show no infection signs and symptoms  Outcome: Not Met This Shift     Problem: Musculor/Skeletal Functional Status  Goal: Highest potential functional level  Outcome: Not Met This Shift     Problem: Restraint Use - Nonviolent/Non-Self-Destructive Behavior:  Goal: Absence of restraint indications  Description: Absence of restraint indications  Outcome: Not Met This Shift

## 2020-07-04 NOTE — PROGRESS NOTES
[address] [facility]  Department of Internal Medicine   Internal Medicine Residency   MICU Progress Note    Patient:  Fay Burleson 80 y.o. male  MRN: [id number]     Date of Service: 7/4/2020    Allergy: Reglan [metoclopramide] and Other    Subjective       Patient was seen at bedside in am    Patient lorenzo intubated and sedated   No issues overnight   Wean Levophed as tolerated    Objective   I & O - 24hr:     Intake/Output Summary (Last 24 hours) at 7/4/2020 1251  Last data filed at 7/4/2020 0840  Gross per 24 hour   Intake 1532 ml   Output 2425 ml   Net -893 ml       Vitals: /65   Pulse 88   Temp 99.1 °F (37.3 °C) (Bladder)   Resp 16   Ht 5' 6\" (1.676 m)   Wt 143 lb (64.9 kg)   SpO2 100%   BMI 23.08 kg/m²        Constitutional: Patient is sedated and intubated.  Head: Normocephalic and atraumatic.  Eyes:  PERRL. conjunctiva normal, (-) scleral icterus. Mucus membranes moist.   Mouth: Intubated.  Neck: (-)  swelling, (-) JVD   · Respiratory: Lungs clear to auscultation bilaterally. (-)  wheezes, (-)  rales, (-)  rhonchi. · Cardiovascular: RRR. (-)  murmurs, (-) gallops,  (-) rubs. S1 and S2 were normal.   · GI:  Abdomen soft, non-tender, non-distended. (+) BS. (-)  rebound, (-) guarding, (-) rigidity. · Extremities: Warm and well perfused. (-) clubbing, (-) cyanosis. 2+ distal pulses. (-) peripheral edema.  Neurologic:   Currently sedated.   (+)Follows commands. (+) Withdrawal from pain. (+)gag reflex         Lines     Site Date/Day    Art line   None    TLC R IJ 1   PICC None    Hemoaccess None      ABG:     Lab Results   Component Value Date    PH 7.639 07/04/2020    PCO2 26.1 07/04/2020    PO2 241.0 07/04/2020    HCO3 27.4 07/04/2020    BE 6.7 07/04/2020    THB 9.7 07/04/2020    O2SAT 99.0 07/04/2020        Medications     Current Facility-Administered Medications   Medication Dose Route Frequency Provider Last Rate Last Dose    sodium chloride flush 0.9 % injection 10 mL  10 mL Intravenous 2 times per day Annmarie Brown MD   10 mL at 07/04/20 0842    sodium chloride flush 0.9 % injection 10 mL  10 mL Intravenous PRN Annmarie Brown MD        acetaminophen (TYLENOL) tablet 650 mg  650 mg Oral Q6H PRN Annmarie Brown MD        Or   Jeimy Adamson acetaminophen (TYLENOL) suppository 650 mg  650 mg Rectal Q6H PRN Annmarie Brown MD        polyethylene glycol [facility]) packet 17 g  17 g Oral Daily PRN Annmarie Brown MD        promethazine (PHENERGAN) tablet 12.5 mg  12.5 mg Oral Q6H PRN Annmarie Brown MD        Or    ondansetron [facility]) injection 4 mg  4 mg Intravenous Q6H PRN Annmarie Brown MD        pantoprazole (PROTONIX) injection 40 mg  40 mg Intravenous Daily Annmarie Brown MD   40 mg at 07/04/20 0841    And    sodium chloride (PF) 0.9 % injection 10 mL  10 mL Intravenous Daily Annmarie Brown MD   10 mL at 07/04/20 0842    norepinephrine (LEVOPHED) 16 mg in dextrose 5% 250 mL infusion  10 mcg/min Intravenous Continuous Annmarie Brown MD 2.8 mL/hr at 07/04/20 1035 3 mcg/min at 07/04/20 1035    propofol injection  10 mcg/kg/min Intravenous Titrated Demario Forde MD 4 mL/hr at 07/04/20 0430 10 mcg/kg/min at 07/04/20 0430    furosemide (LASIX) 100 mg in sodium chloride 0.9 % 100 mL infusion  5 mg/hr Intravenous Continuous Demario Forde MD 5 mL/hr at 07/04/20 0300 5 mg/hr at 07/04/20 0300    piperacillin-tazobactam (ZOSYN) 3.375 g in dextrose 5 % 100 mL IVPB extended infusion (mini-bag)  3.375 g Intravenous Q8H Demario Forde MD 25 mL/hr at 07/04/20 1118 3.375 g at 07/04/20 1118    And    0.9 % sodium chloride infusion admixture   Intravenous Q8H Demario Forde MD   Stopped at 07/04/20 0745    [Held by provider] apixaban (ELIQUIS) tablet 2.5 mg  2.5 mg Oral BID Demario Forde MD        midazolam (VERSED) injection 2 mg  2 mg Intravenous Q4H PRN Demario Forde MD        fentaNYL 5 mcg/ml in 0.9%  ml infusion  25 mcg/hr Intravenous Continuous Gaby Pereira MD 15 mL/hr at 07/04/20 0606 75 mcg/hr at 07/04/20 0606    mineral oil-hydrophilic petrolatum (HYDROPHOR) ointment   Topical BID PRN Belinda Chou MD        isosorbide dinitrate (ISORDIL) tablet 10 mg  10 mg Oral TID [facility] DO   10 mg at 07/04/20 0840    [Held by provider] spironolactone (ALDACTONE) tablet 25 mg  25 mg Oral Q MWF Herminia Orly DO   25 mg at 07/01/20 2000    aspirin chewable tablet 81 mg  81 mg Oral Daily Belinda Chou MD   81 mg at 07/04/20 0840    ipratropium-albuterol (DUONEB) nebulizer solution 3 mL  3 mL Inhalation Q4H Belinda Chou MD   3 mL at 07/04/20 1158    levothyroxine (SYNTHROID) tablet 112 mcg  112 mcg Oral Daily Belinda Chou MD   112 mcg at 07/04/20 0645    perflutren lipid microspheres (DEFINITY) injection 1.65 mg  1.5 mL Intravenous ONCE PRN [facility],         [Held by provider] carvedilol (COREG) tablet 6.25 mg  6.25 mg Oral BID [facility]   6.25 mg at 07/04/20 0840     Facility-Administered Medications Ordered in Other Encounters   Medication Dose Route Frequency Provider Last Rate Last Dose    iohexol (OMNIPAQUE 240) injection 50 mL  50 mL Oral ONCE PRN Basem A Ashleigh        ioversol (OPTIRAY) 68 % injection 120 mL  120 mL Intravenous ONCE PRN Basem A Ashleigh            Labs   CBC with Differential:    Lab Results   Component Value Date    WBC 8.0 07/04/2020    RBC 3.39 07/04/2020    HGB 8.8 07/04/2020    HCT 27.5 07/04/2020     07/04/2020    MCV 81.1 07/04/2020    MCH 26.0 07/04/2020    MCHC 32.0 07/04/2020    RDW 21.4 07/04/2020    METASPCT 0.9 07/04/2020    LYMPHOPCT 3.5 07/04/2020    PROMYELOPCT 1.7 08/30/2019    MONOPCT 4.4 07/04/2020    MYELOPCT 0.9 07/04/2020    BASOPCT 0.3 07/04/2020    MONOSABS 0.32 07/04/2020    LYMPHSABS 0.32 07/04/2020    EOSABS 0.07 07/04/2020    BASOSABS 0.00 07/04/2020     CMP:    Lab Results   Component Value Date     2020    K 4.1 2020    K 3.0 2019     2020    CO2 27 2020    BUN 61 2020    CREATININE 1.9 2020    GFRAA 40 2020    LABGLOM 40 2020    GLUCOSE 109 2020    PROT 5.8 2020    LABALBU 2.8 2020    CALCIUM 9.1 2020    BILITOT 1.2 2020    ALKPHOS 67 2020    AST 29 2020    ALT 14 2020     Magnesium:    Lab Results   Component Value Date    MG 2.1 2020     Phosphorus:  No results found for: PHOS  Last 3 Troponin:    Lab Results   Component Value Date    TROPONINI 0.19 2020    TROPONINI 0.17 2020    TROPONINI 0.16 2020     ABG:    Lab Results   Component Value Date    PH 7.639 2020    PCO2 26.1 2020    PO2 241.0 2020    HCO3 27.4 2020    BE 6.7 2020    O2SAT 99.0 2020     HgBA1c:  No results found for: LABA1C       Imaging Studies:     Xr Chest Standard (2 Vw)    Result Date: 2020  Patient MRN: [id number] : 1929 Age:  80 years Gender: Male Order Date: 2020 7:00 AM Exam: XR CHEST (2 VW) Indication:   pleural effusion pleural effusion Comparison: Chest PA and lateral, 2020 FINDINGS: Moderate right and large left pleural effusions are seen. The heart borders are obscured by the surrounding effusions. The upper lungs are clear and without any significant pulmonary vascular congestion. No pneumothorax is seen. Moderate right and large left pleural effusions. Xr Chest Standard (2 Vw)    Result Date: 2020  Patient MRN: [id number] : 1929 Age:  80 years Gender: Male Order Date: 2020 5:15 PM Exam: XR CHEST (2 VW) Number of Images: 2 view Indication:   sob sob Comparison: 2019 Findings: There is a suggestion of bilateral pleural effusions The heart is enlarged. Lung fields demonstrate patchy bilateral infiltrates which could be related to pulmonary edema.  The aorta is tortuous and ectatic     Cardiomegaly Tortuous aorta central venous catheter overlies the  distal SVC. 1. Status post left thoracentesis. No pneumothorax. 2. Small-to-moderate right and small left pleural effusion. 3. Prominent bilateral pulmonary opacities concerning for pneumonia. 4. Tip of the NG tube is in the proximal stomach. The side-port is at the gastroesophageal junction. Advancement recommended. 5. ET tube and right IJ CVC appear well-positioned. Xr Chest Portable    Result Date: 7/3/2020  Patient MRN: [id number] : 1929 Age:  80 years Gender: Male Order Date: 7/3/2020 5:15 AM Exam: XR CHEST PORTABLE Number of Images: 1 view Indication:   IJ placement IJ placement Comparison: July 3, 2020, 0318 hours. FINDINGS: New right-sided central venous catheter terminates in the SVC. Endotracheal and nasogastric tubes are stable. Bilateral airspace disease is stable and extensive. There may be layering pleural effusions as well. New central venous catheter on the right. Extensive bilateral airspace disease and possible layering pleural effusions as. Xr Chest Portable    Result Date: 7/3/2020  Patient MRN: [id number] : 1929 Age:  80 years Gender: Male Order Date: 7/3/2020 3:15 AM Exam: XR CHEST PORTABLE Number of Images: 1 view Indication:   intubation intubation Comparison: July 3, 2020 FINDINGS: Stable endotracheal and nasogastric tubes. Extensive bilateral airspace disease does not appear appreciably changed. There are likely layering effusions, especially on the left. Stable extensive bilateral airspace disease. Likely pleural effusions, especially on the left.      Xr Abdomen For Ng/og/ne Tube Placement    Result Date: 7/3/2020  Patient MRN:  [id number] : 1929 Age: 80 years Gender: Male Order Date:  7/3/2020 3:15 AM EXAM: XR ABDOMEN FOR NG/OG/NE TUBE PLACEMENT NUMBER OF IMAGES:  1 view(s) INDICATION:  Confirmation of course of NG/OG/NE tube and location of tip of tube Confirmation of course of NG/OG/NE tube and location of tip of MICU    Peter Crisostomo MD, PGY-2  Attending physician: Dr. Cecil Clements    I personally saw, examined and provided care for the patient. Radiographs, labs and medication list were reviewed by me independently. I spoke with bedside nursing, therapists and consultants. Critical care services and times documented are independent of procedures and multidisciplinary rounds with Residents. Additionally comprehensive, multidisciplinary rounds were conducted with the MICU team. The case was discussed in detail and plans for care were established. Review of Residents documentation was conducted and revisions were made as appropriate. I agree with the above documented exam, problem list and plan of care with the following additions:    Oxygenation improved today  He is arousable and following commands  S/p L thoracentesis yesterday with removal of about 1200cc fluid  Repeat CXR today  Low dose levophed - wean off  Will need to hold BB until his BP is better  Vent adjusted  Gram negative jaimie in sputum - on Abx  Start tube feeding  Discussed with son at the bedside. 37 minutes of CCT spent with the patient, reviewing the chart including imaging studies, and discussing the case with other health care professionals. This time excludes procedures.      Arnulfo Sosa MD

## 2020-07-04 NOTE — CONSULTS
Palliative Care Department  Palliative Care Initial Consult  Provider: 18 [facility] Day: 6    Referring Provider:  Dr. Josh Hinkle    Reason for Consult:  [x]  Code status Discussion  [x]  Assist with goals of care  [x]  Psychosocial support  []  Symptom Management  []  Advanced Care Planning    Chief Complaint: Pako Andino is a 80 y.o. male with chief complaint of syncope, respiratory failure, cardiac arrest    Assessment/Plan      Active Hospital Problems    Diagnosis Date Noted    Severe protein-calorie malnutrition ([facility] [address] [E43] 07/03/2020    Syncope and collapse [R55] 06/29/2020       Respiratory failure/Cardiopulmonary arrest:   -  Per ICU/Pulm   -  Mechanically ventilated   -  Weaning as able    Syncope:   -  Cardiology following closely    Palliative Care Encounter/Recommendations:      - Goals of care: continue current management and to be determined      - Code Status: full code      -  To discuss goals and code further with family or patient as able    Subjective:     HPI:  Pako Andino is a 80 y.o. male with significant past medical history of Anemia, Aortic Anuerysm, CAD, HPN, Thyroid disease. The patient has been noted to experience persistent fatigue and dyspnea hence consulted with his internist. While in the clinic, his daughter witnessed the patient lose consciousness and slump on his chair. The patient was then immediately brought to the emergency department. Workup showed elevated Troponin (0.09), D-Dimer (972) and BNP. He also had Pleural effusion on x-ray. He was admitted under the care of his primary internist and was subsequently referred to cardiology for workup of his syncopal episode. Cardiology ordered a Lung perfusion scan and doppler US of the lower extremity to investigate the elevated d-dimer  He was negative for lower extremity thrombi. Patient was also referred to Pulmonology for the pleural effusion.  Over the course of his hospital stay patient was double vision, hearing problem, tinnitus, hoarseness, dysphagia, odynophagia  RESPIRATORY: cough, shortness of breath, sputum expectoration. CARDIOVASCULAR:  Chest pain/pressure, palpitation, syncope, irregular beats  GASTROINTESTINAL:  abdominal or rectal pain, diarrhea, constipation, . GENITOURINARY:  Burning, frequency, urgency, incontinence, discharge  INTEGUMENTARY: rash, wound, pruritis  HEMATOLOGIC/LYMPHATIC:  Swelling, sores, gum bleeding, easy bruising, pica.   MUSCULOSKELETAL:  pain, edema, joint swelling or redness  NEUROLOGICAL:  light headed, dizziness, loss of consciousness, weakness, change in memory, seizures, tremors    Objective:     Physical Exam  /72   Pulse 84   Temp 99.1 °F (37.3 °C) (Bladder)   Resp 16   Ht 5' 6\" (1.676 m)   Wt 143 lb (64.9 kg)   SpO2 97%   BMI 23.08 kg/m²     Gen:  Elderly, sedated, in no acute distress  HEENT:  Normocephalic, conjunctiva pink, no drainage, mucosa moist  Neck:  Supple  Lungs:  Mechanically ventilated, CTA bilaterally, no audible rhonchi or wheezes noted  Heart[de-identified] RRR, no murmur, rub, or gallop noted during exam  Abd:  Soft, non tender, non distended, BS+  :  gaston  M/S/Ext:  no edema, pulses present  Skin:  Warm and dry  Neuro:  PERRL, sedated    Current Medications:  Inpatient medications reviewed: yes  Home Medications reviewed: yes    Results/Verification of Data Review  Objective data reviewed: labs, images, records, medication use, vitals and chart      - Advanced Directives: unkown   -Surrogate/Legal NOK: Child    Contacts:  Dilshad Muller () and Asya Krishnan [phone])    - Spiritual assessment: No spiritual distress identified     - Bereavement and grief: to be determined    - Discharge planning: to be determined    - Prognosis: unknown    - Referrals to: none today    Time/Communication  Greater than 50% of time spent, total 30 minutes in counseling and coordination of care at the bedside regarding goals of care, diagnosis and prognosis and see above. Kale MOORE  Palliative Medicine    Discussed patient and the plan of care with the other IDT members of Palliative Care, and with floor nurse. Thank you for allowing Palliative Medicine to participate in the care of Raymond Youssef. Note: This report was completed using [employer] voiced recognition software. Every effort has been made to ensure accuracy; however, inadvertent computerized transcription errors may be present.

## 2020-07-04 NOTE — PROGRESS NOTES
decreased tactile fremitus without inspiratory rales. Heart: . Irregular, regular rhythm; S1 > S2, no gallop or murmur. No clicks, rub, palpable thrills   or heaves. PMI nondisplaced, 5th intercostal space MCL. Abdomen: Soft, nontender, nondistended,  mildly protuberant, no masses or organomegaly. Bowel sounds active. Extremities: Without clubbing, cyanosis or edema. Pulses present 3+ upper extermities bilaterally; present 1+ DP and present 1+ PT bilaterally. Data:   Scheduled Meds: Reviewed  Continuous Infusions:    norepinephrine 5 mcg/min (07/03/20 0440)    propofol 20 mcg/kg/min (07/03/20 1725)    furosemide (LASIX) infusion 5 mg/hr (07/03/20 1059)    fentaNYL 5 mcg/ml in 0.9%  ml infusion 50 mcg/hr (07/03/20 1304)    dextrose 50 mL/hr at 07/03/20 1327       Intake/Output Summary (Last 24 hours) at 7/3/2020 2116  Last data filed at 7/3/2020 2000  Gross per 24 hour   Intake 990 ml   Output 2127 ml   Net -1137 ml     CBC:   Recent Labs     07/01/20  0450 07/03/20  0347   WBC 6.4 7.5   HGB 8.8* 8.1*   HCT 28.9* 26.2*    162     BMP:  Recent Labs     07/03/20  0207  07/03/20  0347 07/03/20  0834 07/03/20 2000   NA  --   --  140 142 144   K 5.81*  --  4.1 4.2 4.2   CL  --   --  102 101 102   CO2  --   --  24 28 29   BUN  --   --  66* 64* 61*   CREATININE  --   --  1.9* 1.8* 1.9*   LABGLOM  --    < > 40 43 40    < > = values in this interval not displayed.      ABGs:   Lab Results   Component Value Date    PH 7.458 07/03/2020    PO2 79.8 07/03/2020    PCO2 40.5 07/03/2020     INR:   Recent Labs     07/03/20  0347   INR 1.5     PRO-BNP:   Lab Results   Component Value Date    PROBNP 26,915 (H) 07/03/2020    PROBNP 34,147 (H) 06/29/2020      TSH:   Lab Results   Component Value Date    TSH 12.360 (H) 07/03/2020      Cardiac Injury Profile:   Recent Labs     07/03/20  0347 07/03/20  0834 07/03/20  1122 07/03/20  1520 07/03/20 2000   CKTOTAL 66  --   --   --   --    CKMB 4.7 7.6  --  5.7  -- 6/29/2020 6:45 PM EXAM: US DUP LOWER EXTREMITIES BILATERAL VENOUS INDICATION:  Elevated d dimer syncope concern for DVT leg pain Elevated d dimer syncope concern for DVT leg pain  COMPARISON: None FINDINGS: Normal color flow is seen in the veins, which demonstrate normal compressibility and augmentation. No evidence of deep venous thrombosis. EKG: See Report  Echo: See Report      IMPRESSIONS:  Active Problems:    Syncope and collapse    Severe protein-calorie malnutrition ([address]  Resolved Problems:    * No resolved hospital problems. *      RECOMMENDATIONS:   hold clonidine presently and decreased dosage of carvedilol as tolerated maintaining a systolic blood pressure >040 mmHg. Spoke with his daughter at great length and she indicates that she fully understands complications and the potential poor prognosis. I have spent more than  30 critical care minutes face to face with Gisselle Martinez and reviewing notes and laboratory data, with greater than 50% of this time instructing and counseling the patient's  daughter  regarding my findings and recommendations and I have answered all questions as posed to me by Mr. Velia Ruiz  daughter    Anca Silva, DO FACP,FACC,FSCAI      NOTE:  This report was transcribed using voice recognition software.   Every effort was made to ensure accuracy; however, inadvertent computerized transcription errors may be present

## 2020-07-04 NOTE — PROGRESS NOTES
CC/Overnight events:   Remains sedated and on full ventilator support. On Propofol at 10 mcg/kg/hr and Fentanyl at 75 mcg/hr. Requiring Norepinephrine at 1.5 mcg/min and is on furosemide drip at 5 mg/hour.      Current Facility-Administered Medications   Medication Dose Route Frequency Provider Last Rate Last Dose    sodium chloride flush 0.9 % injection 10 mL  10 mL Intravenous 2 times per day Lise Ulloa MD   10 mL at 07/03/20 2012    sodium chloride flush 0.9 % injection 10 mL  10 mL Intravenous PRN Lise Ulloa MD        acetaminophen (TYLENOL) tablet 650 mg  650 mg Oral Q6H PRN Lise Ulloa MD        Or   Shay Emmanuel acetaminophen (TYLENOL) suppository 650 mg  650 mg Rectal Q6H PRN Lise Ulloa MD        polyethylene glycol (GLYCOLAX) packet 17 g  17 g Oral Daily PRN Lise Ulloa MD        promethazine (PHENERGAN) tablet 12.5 mg  12.5 mg Oral Q6H PRN Lise Ulloa MD        Or    [facility] injection 4 mg  4 mg Intravenous Q6H PRN Lise Ulloa MD        pantoprazole (PROTONIX) injection 40 mg  40 mg Intravenous Daily Lise Ulloa MD   40 mg at 07/03/20 1019    And    sodium chloride (PF) 0.9 % injection 10 mL  10 mL Intravenous Daily Lise Ulloa MD   10 mL at 07/03/20 1053    norepinephrine (LEVOPHED) 16 mg in dextrose 5% 250 mL infusion  10 mcg/min Intravenous Continuous Lise Ulloa MD 1.9 mL/hr at 07/04/20 0400 2 mcg/min at 07/04/20 0400    propofol injection  10 mcg/kg/min Intravenous Titrated Bryant Meredith MD 4 mL/hr at 07/04/20 0430 10 mcg/kg/min at 07/04/20 0430    furosemide (LASIX) 100 mg in sodium chloride 0.9 % 100 mL infusion  5 mg/hr Intravenous Continuous Bryant Meredith MD 5 mL/hr at 07/04/20 0300 5 mg/hr at 07/04/20 0300    piperacillin-tazobactam (ZOSYN) 3.375 g in dextrose 5 % 100 mL IVPB extended infusion (mini-bag)  3.375 g Intravenous Q8H Bryant Meredith MD   Stopped at 07/04/20 0530    And  0.9 % sodium chloride infusion admixture   Intravenous Q8H Lisa Taylor MD 12.5 mL/hr at 07/04/20 0545      [Held by provider] apixaban (ELIQUIS) tablet 2.5 mg  2.5 mg Oral BID Lisa Taylor MD        midazolam (VERSED) injection 2 mg  2 mg Intravenous Q4H PRN Lisa Taylor MD        fentaNYL 5 mcg/ml in 0.9%  ml infusion  25 mcg/hr Intravenous Continuous Lisa Taylor MD 15 mL/hr at 07/04/20 0606 75 mcg/hr at 07/04/20 0606    dextrose 5 % solution   Intravenous Continuous Joy Mason MD 50 mL/hr at 07/03/20 1327      mineral oil-hydrophilic petrolatum (HYDROPHOR) ointment   Topical BID PRN Theo Roach MD        isosorbide dinitrate (ISORDIL) tablet 10 mg  10 mg Oral TID Renee Leventhal, DO   10 mg at 07/03/20 1520    [Held by provider] spironolactone (ALDACTONE) tablet 25 mg  25 mg Oral Q MWF Renee Leventhal, DO   25 mg at 07/01/20 2000    aspirin chewable tablet 81 mg  81 mg Oral Daily Theo Roach MD   81 mg at 07/03/20 1018    ipratropium-albuterol (DUONEB) nebulizer solution 3 mL  3 mL Inhalation Q4H Theo Roach MD   3 mL at 07/04/20 0410    levothyroxine (SYNTHROID) tablet 112 mcg  112 mcg Oral Daily Theo Roach MD   112 mcg at 07/04/20 0645    perflutren lipid microspheres (DEFINITY) injection 1.65 mg  1.5 mL Intravenous ONCE PRN Renee Leventhal, DO        carvedilol (COREG) tablet 6.25 mg  6.25 mg Oral BID WC Renee Leventhal, DO   Stopped at 07/03/20 1022     Facility-Administered Medications Ordered in Other Encounters   Medication Dose Route Frequency Provider Last Rate Last Dose    iohexol (OMNIPAQUE 240) injection 50 mL  50 mL Oral ONCE PRN Basem A Ashleigh        ioversol (OPTIRAY) 68 % injection 120 mL  120 mL Intravenous ONCE PRN Basem A Ashleigh           Objective:   /62   Pulse 94   Temp 99.9 °F (37.7 °C) (Bladder)   Resp 16   Ht 5' 6\" (1.676 m)   Wt 143 lb (64.9 kg)   SpO2 100%   BMI 23.08 kg/m²     Intake/Output Summary (Last 24 hours) at 7/4/2020 0756  Last data filed at 7/4/2020 0600  Gross per 24 hour   Intake 1482 ml   Output 2455 ml   Net -973 ml       Vent Information  $Ventilation: $Subsequent Day  Equipment ID: [id number]  Vent Type: [phone]  Vent Mode: AC/VC  Vt Ordered: 380 mL  Rate Set: 16 bmp  Peak Flow: 70 L/min  Pressure Support: 0 cmH20  FiO2 : 40 %  SpO2: 100 %  SpO2/FiO2 ratio: 250  Sensitivity: 3  PEEP/CPAP: 5  I Time/ I Time %: 0 s  Humidification Source: Heated wire  Humidification Temp: 37  Humidification Temp Measured: 40    WD thin older male on full ventilator support appearing younger than stated age  Skin: warm, dry, without rash except right foot which is cooler but not discolored  HEENT: PERRL, face symmetric, Pupils small, equal, reactive. ET (#8) at 25 at the lip  Chest: symmetric with equal air entry. Lungs: without rales, rhonchi, or wheezes  Cardiac: normal PMI, S1, S2 normal.  No murmur or samina  Abdomen: falt, soft, non-tender, active bowel sounds. Liver palpable 2 cm below RCM and seems slightly tender  Extremities: No clubbing, cyanosis; trace lower ext edema, 2 + edema hands  Neuro: CN II - XII grossly intact.   Sedated and not responsive to voice, light tactile stimuli HEATHER - 3 to -4    CBC with Differential:    Lab Results   Component Value Date    WBC 8.0 07/04/2020    RBC 3.39 07/04/2020    HGB 8.8 07/04/2020    HCT 27.5 07/04/2020     07/04/2020    MCV 81.1 07/04/2020    MCH 26.0 07/04/2020    MCHC 32.0 07/04/2020    RDW 21.4 07/04/2020    METASPCT 0.9 07/04/2020    LYMPHOPCT 3.5 07/04/2020    PROMYELOPCT 1.7 08/30/2019    MONOPCT 4.4 07/04/2020    MYELOPCT 0.9 07/04/2020    BASOPCT 0.3 07/04/2020    MONOSABS 0.32 07/04/2020    LYMPHSABS 0.32 07/04/2020    EOSABS 0.07 07/04/2020    BASOSABS 0.00 07/04/2020     CMP:    Lab Results   Component Value Date     07/04/2020    K 3.8 07/04/2020    K 3.0 08/29/2019    CL 99 07/04/2020    CO2 27 07/04/2020    BUN 64 07/04/2020    CREATININE 1.9 07/04/2020    GFRAA 40 07/04/2020    LABGLOM 40 07/04/2020    GLUCOSE 94 07/04/2020    PROT 5.8 07/04/2020    LABALBU 2.8 07/04/2020    CALCIUM 9.4 07/04/2020    BILITOT 1.2 07/04/2020    ALKPHOS 67 07/04/2020    AST 29 07/04/2020    ALT 14 07/04/2020     ABG:    Lab Results   Component Value Date    PH 7.639 07/04/2020    PCO2 26.1 07/04/2020    PO2 241.0 07/04/2020    HCO3 27.4 07/04/2020    BE 6.7 07/04/2020    O2SAT 99.0 07/04/2020     Trop 0.19     CXRs reviewed: 7/3/2020      Impression:  1. S/P cardiac arrest - now on full ventilator support  2. Bilateral pleural effusions - s/p left thoracentesis yesterday - fluid appears exudative (total protein 3 with serum of 5.8 and LD at 168 - also note increase RBCs and amylase)  3. HFrEF 38+ %    Active Problems:    Syncope and collapse    Severe protein-calorie malnutrition (HCC)  Resolved Problems:    * No resolved hospital problems. *      Plans:   1. Weaning as per critical care service  2. Continue lasix drip for now  3.  Cytology pending    Ophelia Rome MD, FACP, [facility]

## 2020-07-04 NOTE — PROGRESS NOTES
Nephrology Progress Note  7/4/2020 2:34 PM  Subjective:   Admit Date: 6/29/2020  PCP: Nikole Joel MD    Interval History: Patient was seen in the medical intensive care unit. Patient's son was present at the bedside and the case was discussed with him. Intubated, on mechanical ventilation and on intravenous norepinephrine. On sedation. Sleeping but can be awakened. Continues to be on Lasix drip with excellent urine output via Sanchez catheter. Diet: DIET TUBE FEED CONTINUOUS/CYCLIC NPO; STANDARD WITH FIBER; Nasogastric; 10; 55    Data:     Scheduled Meds:   sodium chloride flush  10 mL Intravenous 2 times per day    pantoprazole  40 mg Intravenous Daily    And    sodium chloride (PF)  10 mL Intravenous Daily    piperacillin-tazobactam  3.375 g Intravenous Q8H    And    sodium chloride   Intravenous Q8H    [Held by provider] apixaban  2.5 mg Oral BID    isosorbide dinitrate  10 mg Oral TID    [Held by provider] spironolactone  25 mg Oral Q MWF    aspirin  81 mg Oral Daily    ipratropium-albuterol  3 mL Inhalation Q4H    levothyroxine  112 mcg Oral Daily    [Held by provider] carvedilol  6.25 mg Oral BID WC     Continuous Infusions:   furosemide (LASIX) 1mg/ml infusion      norepinephrine 3 mcg/min (07/04/20 1035)    propofol 10 mcg/kg/min (07/04/20 0430)    fentaNYL 5 mcg/ml in 0.9%  ml infusion 75 mcg/hr (07/04/20 0606)     PRN Meds:sodium chloride flush, acetaminophen **OR** acetaminophen, polyethylene glycol, promethazine **OR** ondansetron, midazolam, mineral oil-hydrophilic petrolatum, perflutren lipid microspheres  I/O last 3 completed shifts: In: 3339 [I.V.:1482]  Out: 3682 [Urine:2455]  I/O this shift:   In: 50 [NG/GT:50]  Out: 400 [Urine:400]    Intake/Output Summary (Last 24 hours) at 7/4/2020 1434  Last data filed at 7/4/2020 0840  Gross per 24 hour   Intake 1532 ml   Output 2425 ml   Net -893 ml     CBC:   Recent Labs     07/03/20  0347 07/04/20  0400   WBC 7.5 8.0   HGB 8. 1* 8.8*    190     BMP:    Recent Labs     20  0400 20  1025    140 141   K 4.2 3.8 4.1    99 101   CO2 29 27 27   BUN 61* 64* 61*   CREATININE 1.9* 1.9* 1.9*   GLUCOSE 109* 94 109*     Hepatic:   Recent Labs     20  0347 20  0834 20  0400   AST 33 33 29   ALT 17 16 14   BILITOT 0.6 1.1 1.2   ALKPHOS 78 74 67     Protein/ Albumin:    Lab Results   Component Value Date    LABALBU 2.8 (L) 2020      Imaging Results          Procedure Component Value Ref Range Date/Time     XR CHEST PORTABLE [[id number]] Resulted: 20 1341     Order Status: Completed Updated: 20     Narrative:       Patient MRN: [id number]  : 1929  Age:  80 years  Gender: Male  Order Date: 2020 10:30 AM  Exam: XR CHEST PORTABLE  Indication:   Pleural effusion   Pleural effusion   Comparison: Chest radiograph, 7/3/2020     FINDINGS:    Bibasilar pulmonary opacities likely representing consolidations. Small-to-moderate right and small left pleural effusions. The  cardiomediastinal contour is unremarkable. No pneumothorax is seen. The endotracheal tube is well positioned, with the tip approximately  4.8 cm above the sarwat. Tip of the nasogastric tube overlies the  stomach. Tip of the right IJ central venous catheter overlies the  distal SVC.      Impression:         1. No significant interval change as of yesterday. 2. Left basilar pulmonary consolidations concerning for pneumonia. 3. Small to moderate right and small left pleural effusions 4.  The  life support lines and tubes appear well positioned.     XR CHEST PORTABLE [[id number]] Resulted: 20     Order Status: Completed Updated: 20     Narrative:       Patient MRN: [id number]  : 1929  Age:  80 years  Gender: Male  Order Date: 7/3/2020 2:45 PM  Exam: XR CHEST PORTABLE  Indication:   s/p left thoracentesis.  exclude PTX   s/p left thoracentesis.  exclude PTX   Comparison: upper lungs are  clear and without any significant pulmonary vascular congestion. No  pneumothorax is seen.      Impression:       Moderate right and large left pleural effusions.     NM LUNG SCAN PERFUSION ONLY [[id number]] Resulted: 20 1159     Order Status: Completed Updated: 20 1201     Narrative:       Patient MRN: [id number]  : 1929  Age:  80 years  Gender: Male  Order Date: 2020 10:00 AM  Exam: NM LUNG SCAN PERFUSION ONLY  Number of Images:   2  views  Indication:   syncope and elevated d dimer   syncope and elevated d dimer  Comparison: 2020    Findings:   The patient received 7.9 millicuries of technetium MAA. The chest radiograph demonstrates what appears to be large bilateral  infiltrates and effusions. There is relatively limited aeration of the  upper lobes  Perfusion images reveal segmental perfusion defects. There is a defect  in the inferior right lobe possibly pleural effusion and infiltrate. There is a defect in the left lower lobe again possibly effusion and  infiltrate     Impression:       Segmental perfusion defects, in the left lower lobe and right lower  lobe. Possible defects from infiltrate and effusions, given the  appearance of the chest x-ray. Pulmonary embolism cannot be excluded.     ALERT:  THIS IS AN ABNORMAL REPORT     US DUP LOWER EXTREMITIES BILATERAL VENOUS [[id number]] Resulted: 20     Order Status: Completed Updated: 20     Narrative:       Patient MRN:  [id number]  : 1929  Age: 80 years  Gender: Male  Order Date:  2020 6:45 PM  EXAM: US DUP LOWER EXTREMITIES BILATERAL VENOUS   INDICATION:  Elevated d dimer syncope concern for DVT leg pain   Elevated d dimer syncope concern for DVT leg pain    COMPARISON: None    FINDINGS:    Normal color flow is seen in the veins, which demonstrate normal  compressibility and augmentation.      Impression:       No evidence of deep venous thrombosis.         XR CHEST STANDARD (2 VW) sedation and IV Lasix drip. Sleeping but can be awakened. Does not seem to be in acute distress. Patient's son was present at the bedside in the medical intensive care unit  Lungs: Slightly diminished air movement both lung bases  Heart: No S3, No rub  Abdomen: Lax, soft No tenderness  L. Extremities: No edema    Assessment & Plan:     Acute kidney injury on top of stage III chronic kidney disease with baseline serum creatinine 1.3-1.5 reflecting decreased renal perfusion with hypotension requiring intravenous norepinephrine also reflecting the hemodynamic effect of HFrEF with possible cardiorenal syndrome. At this point, blood pressure is stable but requiring a small dose of intravenous norepinephrine drip, urine output is adequate and patient is diuresing on Lasix drip and creatinine is stable. I discussed the probability of worsening kidney function over the coming days with the patient's Son at the bedside. I explained to him that there is no indication to consider renal replacement therapy at this point.       Anemia: Could be related to chronic kidney disease. Transfuse as needed. Patient is on Eliquis which is currently on hold     Persistent history of proteinuria: Check  UPEP and SPEP      This note was created using voice recognition software.     Electronically signed by Marely Ritter MD on 7/4/2020 at 2:34 PM

## 2020-07-05 NOTE — PLAN OF CARE
Problem: Falls - Risk of:  Goal: Will remain free from falls  Description: Will remain free from falls  7/5/2020 1753 by Ni Giron RN  Outcome: Met This Shift     Problem: Falls - Risk of:  Goal: Absence of physical injury  Description: Absence of physical injury  7/5/2020 1753 by Ni Giron RN  Outcome: Met This Shift     Problem: Restraint Use - Nonviolent/Non-Self-Destructive Behavior:  Goal: Absence of restraint-related injury  Description: Absence of restraint-related injury  7/5/2020 1753 by Ni Giron RN  Outcome: Met This Shift     Problem: Skin Integrity:  Goal: Will show no infection signs and symptoms  Description: Will show no infection signs and symptoms  Outcome: Not Met This Shift     Problem: Musculor/Skeletal Functional Status  Goal: Highest potential functional level  Outcome: Not Met This Shift

## 2020-07-05 NOTE — PROGRESS NOTES
[address] [facility]  Department of Internal Medicine   Internal Medicine Residency   MICU Progress Note    Patient:  Nathanael Hooper 80 y.o. male  MRN: [id number]     Date of Service: 7/5/2020    Allergy: Reglan [metoclopramide] and Other    Subjective       Patient was seen at bedside in am    Patient lorenzo intubated and sedated   No issues overnight   Off pressors   Weaning trial today with sedation vacation   Continue diuresis     Objective   I & O - 24hr:     Intake/Output Summary (Last 24 hours) at 7/5/2020 1428  Last data filed at 7/5/2020 1000  Gross per 24 hour   Intake 1693 ml   Output 2920 ml   Net -1227 ml       Vitals: /78   Pulse 87   Temp 98.8 °F (37.1 °C) (Bladder)   Resp 14   Ht 5' 6\" (1.676 m)   Wt 141 lb 7 oz (64.2 kg)   SpO2 100%   BMI 22.83 kg/m²        Constitutional: Patient is sedated and intubated.  Head: Normocephalic and atraumatic.  Eyes:  PERRL. conjunctiva normal, (-) scleral icterus. Mucus membranes moist.   Mouth: Intubated.  Neck: (-)  swelling, (-) JVD   · Respiratory: Lungs clear to auscultation bilaterally. (-)  wheezes, (-)  rales, (-)  rhonchi. · Cardiovascular: RRR. (-)  murmurs, (-) gallops,  (-) rubs. S1 and S2 were normal.   · GI:  Abdomen soft, non-tender, non-distended. (+) BS. (-)  rebound, (-) guarding, (-) rigidity. · Extremities: Warm and well perfused. (-) clubbing, (-) cyanosis. 2+ distal pulses. (-) peripheral edema.  Neurologic:   Currently sedated.   (+)Follows commands. (+) Withdrawal from pain. (+)gag reflex         Lines     Site Date/Day    Art line   None    TLC R IJ 2   PICC None    Hemoaccess None      ABG:     Lab Results   Component Value Date    PH 7.532 07/05/2020    PCO2 34.1 07/05/2020    PO2 100.9 07/05/2020    HCO3 28.0 07/05/2020    BE 5.2 07/05/2020    THB 10.1 07/05/2020    O2SAT 97.3 07/05/2020        Medications     Current Facility-Administered Medications   Medication Dose Route Frequency Provider Last Rate Last Dose    carvedilol (COREG) tablet 1.5625 mg  1.5625 mg Oral BID  Monie Campo MD   1.5625 mg at 07/05/20 1157    cefTRIAXone (ROCEPHIN) 1 g in sterile water 10 mL IV syringe  1 g Intravenous Q24H Monie Campo MD   1 g at 07/05/20 1152    ferrous sulfate (IRON 325) tablet 325 mg  325 mg Oral BID  Monie Campo MD   325 mg at 07/05/20 1158    furosemide (LASIX) 100 mg in sodium chloride 0.9 % 100 mL infusion  5 mg/hr Intravenous Continuous Geno Whitmore MD 5 mL/hr at 07/05/20 0208 5 mg/hr at 07/05/20 0208    sodium chloride flush 0.9 % injection 10 mL  10 mL Intravenous 2 times per day Evelina Daly MD   10 mL at 07/05/20 5796    sodium chloride flush 0.9 % injection 10 mL  10 mL Intravenous PRN Evelina Daly MD        acetaminophen (TYLENOL) tablet 650 mg  650 mg Oral Q6H PRN Evelina Daly MD        Or   Marlys Cleary acetaminophen (TYLENOL) suppository 650 mg  650 mg Rectal Q6H PRN Evelina Daly MD        polyethylene glycol (GLYCOLAX) packet 17 g  17 g Oral Daily PRN Evelina Daly MD        promethazine (PHENERGAN) tablet 12.5 mg  12.5 mg Oral Q6H PRN Evelina Daly MD        Or    ondansetron [facility]) injection 4 mg  4 mg Intravenous Q6H PRN Evelina Daly MD        pantoprazole (PROTONIX) injection 40 mg  40 mg Intravenous Daily Evelina Daly MD   40 mg at 07/05/20 7907    And    sodium chloride (PF) 0.9 % injection 10 mL  10 mL Intravenous Daily Evelina Daly MD   10 mL at 07/05/20 0947    norepinephrine (LEVOPHED) 16 mg in dextrose 5% 250 mL infusion  10 mcg/min Intravenous Continuous Evelina Daly MD   Stopped at 07/04/20 2320    propofol injection  10 mcg/kg/min Intravenous Titrated Geno Whitmore MD 4 mL/hr at 07/04/20 1741 10 mcg/kg/min at 07/04/20 1741    apixaban (ELIQUIS) tablet 2.5 mg  2.5 mg Oral BID Geno Whitmore MD   2.5 mg at 07/05/20 1158    midazolam (VERSED) injection 2 mg  2 mg Intravenous Q4H PRN Lisa Taylor MD        fentaNYL 5 mcg/ml in 0.9%  ml infusion  25 mcg/hr Intravenous Continuous Lisa Taylor MD 15 mL/hr at 07/05/20 0002 75 mcg/hr at 07/05/20 0002    mineral oil-hydrophilic petrolatum (HYDROPHOR) ointment   Topical BID PRN Theo Roach MD        isosorbide dinitrate (ISORDIL) tablet 10 mg  10 mg Oral TID Renee Leventhal, DO   10 mg at 07/05/20 9835    [Held by provider] spironolactone (ALDACTONE) tablet 25 mg  25 mg Oral Q MWF Renee Leventhal, DO   25 mg at 07/01/20 2000    aspirin chewable tablet 81 mg  81 mg Oral Daily Theo Roach MD   81 mg at 07/05/20 0923    ipratropium-albuterol (DUONEB) nebulizer solution 3 mL  3 mL Inhalation Q4H Theo Roach MD   3 mL at 07/05/20 1310    levothyroxine (SYNTHROID) tablet 112 mcg  112 mcg Oral Daily Theo Roach MD   112 mcg at 07/05/20 0601    perflutren lipid microspheres (DEFINITY) injection 1.65 mg  1.5 mL Intravenous ONCE PRN Renee Leventhal, DO         Facility-Administered Medications Ordered in Other Encounters   Medication Dose Route Frequency Provider Last Rate Last Dose    iohexol (OMNIPAQUE 240) injection 50 mL  50 mL Oral ONCE PRN Basem A Ashleigh        ioversol (OPTIRAY) 68 % injection 120 mL  120 mL Intravenous ONCE PRN Basem A Ashleigh            Labs   CBC with Differential:    Lab Results   Component Value Date    WBC 7.3 07/05/2020    RBC 3.40 07/05/2020    HGB 8.7 07/05/2020    HCT 28.0 07/05/2020     07/05/2020    MCV 82.4 07/05/2020    MCH 25.6 07/05/2020    MCHC 31.1 07/05/2020    RDW 21.9 07/05/2020    METASPCT 0.9 07/04/2020    LYMPHOPCT 5.3 07/05/2020    PROMYELOPCT 1.7 08/30/2019    MONOPCT 5.6 07/05/2020    MYELOPCT 0.9 07/04/2020    BASOPCT 0.3 07/05/2020    MONOSABS 0.41 07/05/2020    LYMPHSABS 0.39 07/05/2020    EOSABS 0.06 07/05/2020    BASOSABS 0.02 07/05/2020     CMP:    Lab Results   Component Value Date     07/05/2020    K 3.7 07/05/2020    K 3.0 08/29/2019    CL 100 2020    CO2 27 2020    BUN 55 2020    CREATININE 2.0 2020    GFRAA 38 2020    LABGLOM 38 2020    GLUCOSE 113 2020    PROT 6.0 2020    LABALBU 2.8 2020    CALCIUM 9.5 2020    BILITOT 1.2 2020    ALKPHOS 70 2020    AST 26 2020    ALT 13 2020     Magnesium:    Lab Results   Component Value Date    MG 2.2 2020     Phosphorus:    Lab Results   Component Value Date    PHOS 3.4 2020     Last 3 Troponin:    Lab Results   Component Value Date    TROPONINI 0.19 2020    TROPONINI 0.19 2020    TROPONINI 0.17 2020     ABG:    Lab Results   Component Value Date    PH 7.532 2020    PCO2 34.1 2020    PO2 100.9 2020    HCO3 28.0 2020    BE 5.2 2020    O2SAT 97.3 2020     HgBA1c:  No results found for: LABA1C       Imaging Studies:     Xr Chest Standard (2 Vw)    Result Date: 2020  Patient MRN: [id number] : 1929 Age:  80 years Gender: Male Order Date: 2020 7:00 AM Exam: XR CHEST (2 VW) Indication:   pleural effusion pleural effusion Comparison: Chest PA and lateral, 2020 FINDINGS: Moderate right and large left pleural effusions are seen. The heart borders are obscured by the surrounding effusions. The upper lungs are clear and without any significant pulmonary vascular congestion. No pneumothorax is seen. Moderate right and large left pleural effusions. Xr Chest Standard (2 Vw)    Result Date: 2020  Patient MRN: [id number] : 1929 Age:  80 years Gender: Male Order Date: 2020 5:15 PM Exam: XR CHEST (2 VW) Number of Images: 2 view Indication:   sob sob Comparison: 2019 Findings: There is a suggestion of bilateral pleural effusions The heart is enlarged. Lung fields demonstrate patchy bilateral infiltrates which could be related to pulmonary edema.  The aorta is tortuous and ectatic     Cardiomegaly Tortuous aorta There are patchy infiltrates seen throughout both the lung fields. Pulmonary edema could have this appearance There are bilateral pleural effusions present. Nm Lung Scan Perfusion Only    Result Date: 2020  Patient MRN: [id number] : 1929 Age:  80 years Gender: Male Order Date: 2020 10:00 AM Exam: NM LUNG SCAN PERFUSION ONLY Number of Images:   2  views Indication:   syncope and elevated d dimer syncope and elevated d dimer Comparison: 2020 Findings: The patient received 7.9 millicuries of technetium MAA. The chest radiograph demonstrates what appears to be large bilateral infiltrates and effusions. There is relatively limited aeration of the upper lobes Perfusion images reveal segmental perfusion defects. There is a defect in the inferior right lobe possibly pleural effusion and infiltrate. There is a defect in the left lower lobe again possibly effusion and infiltrate     Segmental perfusion defects, in the left lower lobe and right lower lobe. Possible defects from infiltrate and effusions, given the appearance of the chest x-ray. Pulmonary embolism cannot be excluded. ALERT:  THIS IS AN ABNORMAL REPORT    Xr Chest Portable    Result Date: 7/3/2020  Patient MRN: [id number] : 1929 Age:  80 years Gender: Male Order Date: 7/3/2020 2:45 PM Exam: XR CHEST PORTABLE Indication:   s/p left thoracentesis. exclude PTX s/p left thoracentesis. exclude PTX Comparison: Chest radiograph, earlier today, 7:01 a.m. FINDINGS: Since earlier chest radiograph, the patient has undergone left-sided thoracentesis. There is decreased left-sided pleural effusion. No pneumothorax is seen. Persistent mid and lower lung consolidations concerning for pneumonia are again noted. Small to moderate right and small left pleural effusions noted. The endotracheal tube is well positioned, with the tip approximately 4.4 cm above the sarwat. Tip of the nasogastric tube is in the proximal stomach.  Tip of the right IJ central venous catheter overlies the  distal SVC. 1. Status post left thoracentesis. No pneumothorax. 2. Small-to-moderate right and small left pleural effusion. 3. Prominent bilateral pulmonary opacities concerning for pneumonia. 4. Tip of the NG tube is in the proximal stomach. The side-port is at the gastroesophageal junction. Advancement recommended. 5. ET tube and right IJ CVC appear well-positioned. Xr Chest Portable    Result Date: 7/3/2020  Patient MRN: [id number] : 1929 Age:  80 years Gender: Male Order Date: 7/3/2020 5:15 AM Exam: XR CHEST PORTABLE Number of Images: 1 view Indication:   IJ placement IJ placement Comparison: July 3, 2020, 0318 hours. FINDINGS: New right-sided central venous catheter terminates in the SVC. Endotracheal and nasogastric tubes are stable. Bilateral airspace disease is stable and extensive. There may be layering pleural effusions as well. New central venous catheter on the right. Extensive bilateral airspace disease and possible layering pleural effusions as. Xr Chest Portable    Result Date: 7/3/2020  Patient MRN: [id number] : 1929 Age:  80 years Gender: Male Order Date: 7/3/2020 3:15 AM Exam: XR CHEST PORTABLE Number of Images: 1 view Indication:   intubation intubation Comparison: July 3, 2020 FINDINGS: Stable endotracheal and nasogastric tubes. Extensive bilateral airspace disease does not appear appreciably changed. There are likely layering effusions, especially on the left. Stable extensive bilateral airspace disease. Likely pleural effusions, especially on the left.      Xr Abdomen For Ng/og/ne Tube Placement    Result Date: 7/3/2020  Patient MRN:  [id number] : 1929 Age: 80 years Gender: Male Order Date:  7/3/2020 3:15 AM EXAM: XR ABDOMEN FOR NG/OG/NE TUBE PLACEMENT NUMBER OF IMAGES:  1 view(s) INDICATION:  Confirmation of course of NG/OG/NE tube and location of tip of tube Confirmation of course of NG/OG/NE tube and location of tip of tube PGY-2  Attending physician: Dr. Akash Smith    I personally saw, examined and provided care for the patient. Radiographs, labs and medication list were reviewed by me independently. I spoke with bedside nursing, therapists and consultants. Critical care services and times documented are independent of procedures and multidisciplinary rounds with Residents. Additionally comprehensive, multidisciplinary rounds were conducted with the MICU team. The case was discussed in detail and plans for care were established. Review of Residents documentation was conducted and revisions were made as appropriate. I agree with the above documented exam, problem list and plan of care with the following additions:    Did fair with PSV trial but tidal volumes were low ~ 200cc  I think he needs more time with diuresis  Continue lasix infusion  Rocephin for klebsiella pneumonia  Tube feeding  Supportive care  Start low dose BB now that pressors are off  SBT in AM    36 minutes of CCT spent with the patient, reviewing the chart including imaging studies, and discussing the case with other health care professionals. This time excludes procedures.      Deidra Ferreira MD

## 2020-07-05 NOTE — PROGRESS NOTES
PROGRESS NOTE       PATIENT PROBLEM LIST:  Active Problems:    Syncope and collapse    Severe protein-calorie malnutrition (HCC)  Resolved Problems:    * No resolved hospital problems. *      SUBJECTIVE:  Elena Smith has regained consciousness and is thrashing about his bed. He remains in atrial flutter with a controlled response. REVIEW OF SYSTEMS:   Unable to accurately obtain secondary to patient's marked decompensated state and presently intubated. Jadon Haynes SCHEDULED MEDICATIONS:   sodium chloride flush  10 mL Intravenous 2 times per day    pantoprazole  40 mg Intravenous Daily    And    sodium chloride (PF)  10 mL Intravenous Daily    piperacillin-tazobactam  3.375 g Intravenous Q8H    And    sodium chloride   Intravenous Q8H    [Held by provider] apixaban  2.5 mg Oral BID    isosorbide dinitrate  10 mg Oral TID    [Held by provider] spironolactone  25 mg Oral Q MWF    aspirin  81 mg Oral Daily    ipratropium-albuterol  3 mL Inhalation Q4H    levothyroxine  112 mcg Oral Daily    [Held by provider] carvedilol  6.25 mg Oral BID WC       VITAL SIGNS:                                                                                                                          BP (!) 111/58   Pulse 87   Temp 98.6 °F (37 °C) (Bladder)   Resp 16   Ht 5' 6\" (1.676 m)   Wt 143 lb (64.9 kg)   SpO2 99%   BMI 23.08 kg/m²   Patient Vitals for the past 96 hrs (Last 3 readings):   Weight   07/04/20 0415 143 lb (64.9 kg)   07/03/20 0300 146 lb 6.4 oz (66.4 kg)   07/02/20 1015 146 lb (66.2 kg)     OBJECTIVE:    HEENT:   SABINA ;sclera non-icteric, conjunctiva pink. Carotids are brisk in upstroke with normal contour. No carotid bruits. Elevated jugular venous pulsation at 45°. No palpable cervical nor supraclavicular nodes. Thyroid not palpable. Trachea midline. endotracheal tube present.   Chest: Even excursion  Lungs:  Few scattered expiratory rhonchi, no expiratory wheezes, no decreased tactile fremitus without inspiratory rales. Heart: . Irregular, regular rhythm; S1 > S2, no gallop or murmur. No clicks, rub, palpable thrills   or heaves. PMI nondisplaced, 5th intercostal space MCL. Abdomen: Soft, nontender, nondistended,  mildly protuberant, no masses or organomegaly. Bowel sounds active. Extremities: Without clubbing, cyanosis or edema. Pulses present 3+ upper extermities bilaterally; present 1+ DP and present 1+ PT bilaterally. Data:   Scheduled Meds: Reviewed  Continuous Infusions:    furosemide (LASIX) 1mg/ml infusion 5 mg/hr (07/04/20 1331)    norepinephrine 3 mcg/min (07/04/20 1035)    propofol 10 mcg/kg/min (07/04/20 1741)    fentaNYL 5 mcg/ml in 0.9%  ml infusion 75 mcg/hr (07/04/20 0606)       Intake/Output Summary (Last 24 hours) at 7/4/2020 2107  Last data filed at 7/4/2020 2000  Gross per 24 hour   Intake 1977 ml   Output 3050 ml   Net -1073 ml     CBC:   Recent Labs     07/03/20  0347 07/04/20  0400   WBC 7.5 8.0   HGB 8.1* 8.8*   HCT 26.2* 27.5*    190     BMP:  Recent Labs     07/03/20  0207  07/03/20  0347 07/03/20  0834 07/03/20 2000 07/04/20  0400 07/04/20  1025 07/04/20  1720   NA  --   --  140 142 144 140 141 143   K 5.81*  --  4.1 4.2 4.2 3.8 4.1 4.2   CL  --   --  102 101 102 99 101 101   CO2  --   --  24 28 29 27 27 28   BUN  --   --  66* 64* 61* 64* 61* 59*   CREATININE  --   --  1.9* 1.8* 1.9* 1.9* 1.9* 2.0*   LABGLOM  --    < > 40 43 40 40 40 38    < > = values in this interval not displayed.      ABGs:   Lab Results   Component Value Date    PH 7.531 07/04/2020    PO2 103.7 07/04/2020    PCO2 33.6 07/04/2020     INR:   Recent Labs     07/03/20  0347   INR 1.5     PRO-BNP:   Lab Results   Component Value Date    PROBNP 26,915 (H) 07/03/2020    PROBNP 34,147 (H) 06/29/2020      TSH:   Lab Results   Component Value Date    TSH 12.360 (H) 07/03/2020      Cardiac Injury Profile:   Recent Labs     07/03/20  0347 07/03/20  0834  07/03/20  1520 07/03/20  2000 07/04/20  0400 20  1720   CKTOTAL 66  --   --   --   --   --   --    CKMB 4.7 7.6  --  5.7  --   --   --    TROPONINI 0.08*  --    < > 0.16* 0.17* 0.19* 0.19*    < > = values in this interval not displayed. Lipid Profile: No results found for: TRIG, HDL, LDLCALC, CHOL   Hemoglobin A1C: No components found for: HGBA1C     RAD:   Xr Chest Standard (2 Vw)    Result Date: 2020  Patient MRN: [id number] : 1929 Age:  80 years Gender: Male Order Date: 2020 5:15 PM Exam: XR CHEST (2 VW) Number of Images: 2 view Indication:   sob sob Comparison: 2019 Findings: There is a suggestion of bilateral pleural effusions The heart is enlarged. Lung fields demonstrate patchy bilateral infiltrates which could be related to pulmonary edema. The aorta is tortuous and ectatic     Cardiomegaly Tortuous aorta There are patchy infiltrates seen throughout both the lung fields. Pulmonary edema could have this appearance There are bilateral pleural effusions present. Nm Lung Scan Perfusion Only    Result Date: 2020  Patient MRN: [id number] : 1929 Age:  80 years Gender: Male Order Date: 2020 10:00 AM Exam: NM LUNG SCAN PERFUSION ONLY Number of Images:   2  views Indication:   syncope and elevated d dimer syncope and elevated d dimer Comparison: 2020 Findings: The patient received 7.9 millicuries of technetium MAA. The chest radiograph demonstrates what appears to be large bilateral infiltrates and effusions. There is relatively limited aeration of the upper lobes Perfusion images reveal segmental perfusion defects. There is a defect in the inferior right lobe possibly pleural effusion and infiltrate. There is a defect in the left lower lobe again possibly effusion and infiltrate     Segmental perfusion defects, in the left lower lobe and right lower lobe. Possible defects from infiltrate and effusions, given the appearance of the chest x-ray. Pulmonary embolism cannot be excluded.  ALERT:  THIS IS AN ABNORMAL REPORT    Us Dup Lower Extremities Bilateral Venous    Result Date: 2020  Patient MRN:  [id number] : 1929 Age: 80 years Gender: Male Order Date:  2020 6:45 PM EXAM: US DUP LOWER EXTREMITIES BILATERAL VENOUS INDICATION:  Elevated d dimer syncope concern for DVT leg pain Elevated d dimer syncope concern for DVT leg pain  COMPARISON: None FINDINGS: Normal color flow is seen in the veins, which demonstrate normal compressibility and augmentation. No evidence of deep venous thrombosis. EKG: See Report  Echo: 7/3/2020      Summary   Left ventricle grossly normal in size. Mild left ventricular concentric hypertrophy noted. Prominent papillary muscle. Global hypokinesis is noted to be moderate. Estimated left ventricular ejection fraction is 38±5%. Diastolic function could not be accurately assessed. The LAESV Index is >34 ml/m2. Mild-moderately dilated right ventricle. TAPSE decreased 0.8. Right ventricle global systolic function is moderately reduced . Physiologic and/or trace mitral regurgitation is present. Trace aortic regurgitation is noted. Mild tricuspid regurgitation. RVSP is 54 mmHg. Pulmonary hypertension is moderate   Physiologic and/or trace pulmonic regurgitation present. Technically good quality study. Compared to prior echo, changes noted. IMPRESSIONS:  Active Problems:    Syncope and collapse    Severe protein-calorie malnutrition (HCC)  Resolved Problems:    * No resolved hospital problems. *      RECOMMENDATIONS:  Attempt to wean from ventilator and pressors as tolerated carefully monitor renal function maintaining systolic blood pressure >306 mmHg.     I have spent more than  30 critical care minutes face to face with Willmarantwan Zhu and reviewing notes and laboratory data, with greater than 50% of this time instructing and counseling the patient's  son regarding my findings and recommendations and I have answered all questions as posed to me by Mr. Bacaberyl Mandujanonatalia son. Giovanni Dears, DO FACP,FACC,FSCAI      NOTE:  This report was transcribed using voice recognition software.   Every effort was made to ensure accuracy; however, inadvertent computerized transcription errors may be present

## 2020-07-05 NOTE — PROGRESS NOTES
Nephrology Progress Note  7/5/2020 11:06 AM  Subjective:   Admit Date: 6/29/2020  PCP: Escobar Bhakta MD    Interval History:   Intubated, on mechanical ventilation and on intravenous norepinephrine. On sedation. Sleeping but can be awakened. Continues to be on Lasix drip with excellent urine output via Sanchez catheter. Tolerating tube feeding. Not on pressors. Diet: DIET TUBE FEED CONTINUOUS/CYCLIC NPO; STANDARD WITH FIBER; Nasogastric; 10; 55    Data:     Scheduled Meds:   potassium chloride  40 mEq Intravenous Once    carvedilol  1.5625 mg Oral BID WC    cefTRIAXone (ROCEPHIN) IV  1 g Intravenous Q24H    ferrous sulfate  325 mg Oral BID WC    sodium chloride flush  10 mL Intravenous 2 times per day    pantoprazole  40 mg Intravenous Daily    And    sodium chloride (PF)  10 mL Intravenous Daily    apixaban  2.5 mg Oral BID    isosorbide dinitrate  10 mg Oral TID    [Held by provider] spironolactone  25 mg Oral Q MWF    aspirin  81 mg Oral Daily    ipratropium-albuterol  3 mL Inhalation Q4H    levothyroxine  112 mcg Oral Daily     Continuous Infusions:   furosemide (LASIX) 1mg/ml infusion 5 mg/hr (07/05/20 0208)    norepinephrine Stopped (07/04/20 2320)    propofol 10 mcg/kg/min (07/04/20 1741)    fentaNYL 5 mcg/ml in 0.9%  ml infusion 75 mcg/hr (07/05/20 0002)     PRN Meds:sodium chloride flush, acetaminophen **OR** acetaminophen, polyethylene glycol, promethazine **OR** ondansetron, midazolam, mineral oil-hydrophilic petrolatum, perflutren lipid microspheres  I/O last 3 completed shifts: In: 1420 [I.V.:1448; NG/GT:295]  Out: 4016 [Urine:2945]  I/O this shift:   In: 0   Out: 375 [Urine:375]    Intake/Output Summary (Last 24 hours) at 7/5/2020 1106  Last data filed at 7/5/2020 1000  Gross per 24 hour   Intake 1693 ml   Output 2920 ml   Net -1227 ml     CBC:   Recent Labs     07/03/20  0347 07/04/20  0400 07/05/20  0530   WBC 7.5 8.0 7.3   HGB 8.1* 8.8* 8.7*    190 178 BMP:    Recent Labs     20  1025 20  1720 20  0530    143 143   K 4.1 4.2 3.7    101 100   CO2 27 28 27   BUN 61* 59* 55*   CREATININE 1.9* 2.0* 2.0*   GLUCOSE 109* 115* 113*     Hepatic:   Recent Labs     20  0834 20  0400 20  0530   AST 33 29 26   ALT 16 14 13   BILITOT 1.1 1.2 1.2   ALKPHOS 74 67 70     Protein/ Albumin:    Lab Results   Component Value Date    LABALBU 2.8 (L) 2020               Procedure Component Value Ref Range Date/Time     XR CHEST PORTABLE [[id number]] Resulted: 20 1341     Order Status: Completed Updated: 20     Narrative:       Patient MRN: [id number]  : 1929  Age:  80 years  Gender: Male  Order Date: 2020 10:30 AM  Exam: XR CHEST PORTABLE  Indication:   Pleural effusion   Pleural effusion   Comparison: Chest radiograph, 7/3/2020     FINDINGS:    Bibasilar pulmonary opacities likely representing consolidations. Small-to-moderate right and small left pleural effusions. The  cardiomediastinal contour is unremarkable. No pneumothorax is seen. The endotracheal tube is well positioned, with the tip approximately  4.8 cm above the sarwat. Tip of the nasogastric tube overlies the  stomach. Tip of the right IJ central venous catheter overlies the  distal SVC.      Impression:         1. No significant interval change as of yesterday. 2. Left basilar pulmonary consolidations concerning for pneumonia. 3. Small to moderate right and small left pleural effusions 4.  The  life support lines and tubes appear well positioned.     XR CHEST PORTABLE [[id number]] Resulted: 20 1623     Order Status: Completed Updated: 20     Narrative:       Patient MRN: [id number]  : 1929  Age:  80 years  Gender: Male  Order Date: 7/3/2020 2:45 PM  Exam: XR CHEST PORTABLE  Indication:   s/p left thoracentesis.  exclude PTX   s/p left thoracentesis.  exclude PTX   Comparison: Chest radiograph, earlier today, 7:01 80 years  Gender: Male  Order Date:  7/3/2020 3:15 AM  EXAM: XR ABDOMEN FOR NG/OG/NE TUBE PLACEMENT  NUMBER OF IMAGES:  1 view(s)  INDICATION:  Confirmation of course of NG/OG/NE tube and location of  tip of tube   Confirmation of course of NG/OG/NE tube and location of tip of tube  Portable? ->Yes  COMPARISON: None    FINDINGS:   This study is confined to the upper abdomen and lower chest. There is  no free air or evidence of bowel pneumatosis. There are no  pathologically dilated segments of bowel. The NG tube tip is well  within the gastric lumen. The chest findings are reported separately.     Impression:       NG tube tip in the stomach. See above.       XR CHEST PORTABLE [[id number]] Resulted: 20 0505     Order Status: Completed Updated: 20 050     Narrative:       Patient MRN: [id number]  : 1929  Age:  80 years  Gender: Male  Order Date: 7/3/2020 3:15 AM  Exam: XR CHEST PORTABLE  Number of Images: 1 view  Indication:   intubation   intubation  Comparison: July 3, 2020    FINDINGS:  Stable endotracheal and nasogastric tubes. Extensive bilateral airspace disease does not appear appreciably  changed. There are likely layering effusions, especially on the left.     Impression:       Stable extensive bilateral airspace disease. Likely pleural effusions,  especially on the left.       US NONVASCULAR TRUNK SCAN [[id number]]      Order Status: No result      XR CHEST STANDARD (2 VW) [[id number]] Resulted: 20 0844     Order Status: Completed Specimen: Chest Updated: 20 0846     Narrative:       Patient MRN: [id number]  : 1929  Age:  80 years  Gender: Male  Order Date: 2020 7:00 AM  Exam: XR CHEST (2 VW)  Indication:   pleural effusion   pleural effusion   Comparison: Chest PA and lateral, 2020     FINDINGS:    Moderate right and large left pleural effusions are seen. The heart  borders are obscured by the surrounding effusions.  The upper lungs are  clear and without any significant pulmonary vascular congestion. No  pneumothorax is seen.      Impression:       Moderate right and large left pleural effusions.     NM LUNG SCAN PERFUSION ONLY [[id number]] Resulted: 20 1159     Order Status: Completed Updated: 20 1201     Narrative:       Patient MRN: [id number]  : 1929  Age:  80 years  Gender: Male  Order Date: 2020 10:00 AM  Exam: NM LUNG SCAN PERFUSION ONLY  Number of Images:   2  views  Indication:   syncope and elevated d dimer   syncope and elevated d dimer  Comparison: 2020    Findings:   The patient received 7.9 millicuries of technetium MAA. The chest radiograph demonstrates what appears to be large bilateral  infiltrates and effusions. There is relatively limited aeration of the  upper lobes  Perfusion images reveal segmental perfusion defects. There is a defect  in the inferior right lobe possibly pleural effusion and infiltrate. There is a defect in the left lower lobe again possibly effusion and  infiltrate     Impression:       Segmental perfusion defects, in the left lower lobe and right lower  lobe. Possible defects from infiltrate and effusions, given the  appearance of the chest x-ray. Pulmonary embolism cannot be excluded.     ALERT:  THIS IS AN ABNORMAL REPORT     US DUP LOWER EXTREMITIES BILATERAL VENOUS [[id number]] Resulted: 20     Order Status: Completed Updated: 20     Narrative:       Patient MRN:  [id number]  : 1929  Age: 80 years  Gender: Male  Order Date:  2020 6:45 PM  EXAM: US DUP LOWER EXTREMITIES BILATERAL VENOUS   INDICATION:  Elevated d dimer syncope concern for DVT leg pain   Elevated d dimer syncope concern for DVT leg pain    COMPARISON: None    FINDINGS:    Normal color flow is seen in the veins, which demonstrate normal  compressibility and augmentation.      Impression:       No evidence of deep venous thrombosis.         XR CHEST STANDARD (2 VW) [[id number]] Resulted: 20 1805     Order Status: Completed Specimen: Chest Updated: 20     Narrative:       Patient MRN: [id number]  : 1929  Age:  80 years  Gender: Male  Order Date: 2020 5:15 PM  Exam: XR CHEST (2 VW)  Number of Images: 2 view  Indication:   sob   sob  Comparison: 2019    Findings: There is a suggestion of bilateral pleural effusions    The heart is enlarged. Lung fields demonstrate patchy bilateral infiltrates which could be  related to pulmonary edema. The aorta is tortuous and ectatic     Impression:       Cardiomegaly  Tortuous aorta  There are patchy infiltrates seen throughout both the lung fields. Pulmonary edema could have this appearance  There are bilateral pleural effusions present.           CTA PULMONARY W CONTRAST [[id number]]      Order Status: Canceled          ECHO 2020   Left ventricle grossly normal in size.   Mild left ventricular concentric hypertrophy noted.   Prominent papillary muscle.   Global hypokinesis is noted to be moderate.   Estimated left ventricular ejection fraction is 56±6%.   Diastolic function could not be accurately assessed.   The LAESV Index is >34 ml/m2.   Mild-moderately dilated right ventricle.   TAPSE decreased 0.8.   Right ventricle global systolic function is moderately reduced .   Physiologic and/or trace mitral regurgitation is present.   Trace aortic regurgitation is noted.   Mild tricuspid regurgitation.  RVSP is 54 mmHg.   Pulmonary hypertension is moderate   Physiologic and/or trace pulmonic regurgitation present.   Technically good quality study.   Compared to prior echo, changes noted.   Suggest clinical correlation. Objective:     Vitals: /78   Pulse 92   Temp 98.8 °F (37.1 °C) (Bladder)   Resp 16   Ht 5' 6\" (1.676 m)   Wt 141 lb 7 oz (64.2 kg)   SpO2 99%   BMI 22.83 kg/m²   General appearance: Intubated, on mechanical ventilation, Off intravenous pressors (norepinephrine), on intravenous sedation and IV Lasix drip. Sleeping but can be awakened. Does not seem to be in acute distress. Tolerating tube feeding. Lungs: Slightly diminished air movement both lung bases  Heart: No S3, No rub  Abdomen: Lax, soft No tenderness  L. Extremities: No edema    Assessment & Plan:     Acute kidney injury on top of stage III chronic kidney disease with baseline serum creatinine 1.3-1.5 reflecting decreased renal perfusion with hypotension requiring intravenous norepinephrine (now off norepinephrine) also reflecting the hemodynamic effect of HFrEF with possible cardiorenal syndrome. At this point, blood pressure is stable, urine output is adequate and patient is diuresing on Lasix drip 5 mg/h and creatinine is stable but continues to be above the baseline. I discussed the probability of worsening kidney function over the coming days with the patient's Son and patient's daughter over the past 2 days. Anemia: Could be related to chronic kidney disease. Transfuse as needed. Patient is on Eliquis.     Persistent history of proteinuria: Check  UPEP and SPEP      This note was created using voice recognition software.     Electronically signed by Yoly Jaquez MD on 7/5/2020 at 11:06 AM

## 2020-07-05 NOTE — PROGRESS NOTES
PROGRESS NOTE       PATIENT PROBLEM LIST:  Active Problems:    Syncope and collapse    Severe protein-calorie malnutrition (HCC)  Resolved Problems:    * No resolved hospital problems. *      SUBJECTIVE:  [address] intubated and sedated utilizing propofol. His daughter is at his bedside. She noted that he was arousable earlier. REVIEW OF SYSTEMS:   Unable to accurately obtain secondary to patient's marked decompensated state and presently  sedated and intubated. Kiarra Ball SCHEDULED MEDICATIONS:   carvedilol  1.5625 mg Oral BID WC    cefTRIAXone (ROCEPHIN) IV  1 g Intravenous Q24H    ferrous sulfate  325 mg Oral BID WC    sodium chloride flush  10 mL Intravenous 2 times per day    pantoprazole  40 mg Intravenous Daily    And    sodium chloride (PF)  10 mL Intravenous Daily    apixaban  2.5 mg Oral BID    isosorbide dinitrate  10 mg Oral TID    [Held by provider] spironolactone  25 mg Oral Q MWF    aspirin  81 mg Oral Daily    ipratropium-albuterol  3 mL Inhalation Q4H    levothyroxine  112 mcg Oral Daily       VITAL SIGNS:                                                                                                                          /78   Pulse 87   Temp 98.8 °F (37.1 °C) (Bladder)   Resp 17   Ht 5' 6\" (1.676 m)   Wt 141 lb 7 oz (64.2 kg)   SpO2 (!) 81%   BMI 22.83 kg/m²   Patient Vitals for the past 96 hrs (Last 3 readings):   Weight   07/05/20 0603 141 lb 7 oz (64.2 kg)   07/04/20 0415 143 lb (64.9 kg)   07/03/20 0300 146 lb 6.4 oz (66.4 kg)     OBJECTIVE:    HEENT:   SABINA ;sclera non-icteric, conjunctiva pink. Carotids are brisk in upstroke with normal contour. No carotid bruits. Elevated jugular venous pulsation at 45°. No palpable cervical nor supraclavicular nodes. Thyroid not palpable. Trachea midline. endotracheal tube present.   Chest: Even excursion  Lungs:  Few scattered expiratory rhonchi, no expiratory wheezes, no decreased tactile fremitus without inspiratory rales.  Heart: . Irregular, regular rhythm; S1 > S2, no gallop or murmur. No clicks, rub, palpable thrills   or heaves. PMI nondisplaced, 5th intercostal space MCL. Abdomen: Soft, nontender, nondistended,  mildly protuberant, no masses or organomegaly. Bowel sounds active. Extremities: Without clubbing, cyanosis or edema. Pulses present 3+ upper extermities bilaterally; present 1+ DP and present 1+ PT bilaterally.      Data:   Scheduled Meds: Reviewed  Continuous Infusions:    furosemide (LASIX) 1mg/ml infusion 5 mg/hr (07/05/20 0208)    norepinephrine Stopped (07/04/20 2320)    propofol 10 mcg/kg/min (07/04/20 1741)    fentaNYL 5 mcg/ml in 0.9%  ml infusion 75 mcg/hr (07/05/20 0002)       Intake/Output Summary (Last 24 hours) at 7/5/2020 1300  Last data filed at 7/5/2020 1000  Gross per 24 hour   Intake 1693 ml   Output 2920 ml   Net -1227 ml     CBC:   Recent Labs     07/03/20  0347 07/04/20  0400 07/05/20  0530   WBC 7.5 8.0 7.3   HGB 8.1* 8.8* 8.7*   HCT 26.2* 27.5* 28.0*    190 178     BMP:  Recent Labs     07/03/20  0347 07/03/20  0834 07/03/20  2000 07/04/20  0400 07/04/20  1025 07/04/20  1720 07/05/20  0530    142 144 140 141 143 143   K 4.1 4.2 4.2 3.8 4.1 4.2 3.7    101 102 99 101 101 100   CO2 24 28 29 27 27 28 27   BUN 66* 64* 61* 64* 61* 59* 55*   CREATININE 1.9* 1.8* 1.9* 1.9* 1.9* 2.0* 2.0*   LABGLOM 40 43 40 40 40 38 38     ABGs:   Lab Results   Component Value Date    PH 7.532 07/05/2020    PO2 100.9 07/05/2020    PCO2 34.1 07/05/2020     INR:   Recent Labs     07/03/20  0347   INR 1.5     PRO-BNP:   Lab Results   Component Value Date    PROBNP 26,915 (H) 07/03/2020    PROBNP 34,147 (H) 06/29/2020      TSH:   Lab Results   Component Value Date    TSH 12.360 (H) 07/03/2020      Cardiac Injury Profile:   Recent Labs     07/03/20  0347 07/03/20  0834  07/03/20  1520 07/03/20 2000 07/04/20  0400 07/04/20  1720   CKTOTAL 66  --   --   --   --   --   --    CKMB 4.7 7.6  -- 5.7  --   --   --    TROPONINI 0.08*  --    < > 0.16* 0.17* 0.19* 0.19*    < > = values in this interval not displayed. Lipid Profile: No results found for: TRIG, HDL, LDLCALC, CHOL   Hemoglobin A1C: No components found for: HGBA1C     RAD:   Xr Chest Standard (2 Vw)    Result Date: 2020  Patient MRN: [id number] : 1929 Age:  80 years Gender: Male Order Date: 2020 5:15 PM Exam: XR CHEST (2 VW) Number of Images: 2 view Indication:   sob sob Comparison: 2019 Findings: There is a suggestion of bilateral pleural effusions The heart is enlarged. Lung fields demonstrate patchy bilateral infiltrates which could be related to pulmonary edema. The aorta is tortuous and ectatic     Cardiomegaly Tortuous aorta There are patchy infiltrates seen throughout both the lung fields. Pulmonary edema could have this appearance There are bilateral pleural effusions present. Nm Lung Scan Perfusion Only    Result Date: 2020  Patient MRN: [id number] : 1929 Age:  80 years Gender: Male Order Date: 2020 10:00 AM Exam: NM LUNG SCAN PERFUSION ONLY Number of Images:   2  views Indication:   syncope and elevated d dimer syncope and elevated d dimer Comparison: 2020 Findings: The patient received 7.9 millicuries of technetium MAA. The chest radiograph demonstrates what appears to be large bilateral infiltrates and effusions. There is relatively limited aeration of the upper lobes Perfusion images reveal segmental perfusion defects. There is a defect in the inferior right lobe possibly pleural effusion and infiltrate. There is a defect in the left lower lobe again possibly effusion and infiltrate     Segmental perfusion defects, in the left lower lobe and right lower lobe. Possible defects from infiltrate and effusions, given the appearance of the chest x-ray. Pulmonary embolism cannot be excluded.  ALERT:  THIS IS AN ABNORMAL REPORT    Us Dup Lower Extremities Bilateral Venous    Result Date: 2020  Patient MRN:  [id number] : 1929 Age: 80 years Gender: Male Order Date:  2020 6:45 PM EXAM: US DUP LOWER EXTREMITIES BILATERAL VENOUS INDICATION:  Elevated d dimer syncope concern for DVT leg pain Elevated d dimer syncope concern for DVT leg pain  COMPARISON: None FINDINGS: Normal color flow is seen in the veins, which demonstrate normal compressibility and augmentation. No evidence of deep venous thrombosis. EKG: See Report  Echo: 7/3/2020      Summary   Left ventricle grossly normal in size. Mild left ventricular concentric hypertrophy noted. Prominent papillary muscle. Global hypokinesis is noted to be moderate. Estimated left ventricular ejection fraction is 38±5%. Diastolic function could not be accurately assessed. The LAESV Index is >34 ml/m2. Mild-moderately dilated right ventricle. TAPSE decreased 0.8. Right ventricle global systolic function is moderately reduced . Physiologic and/or trace mitral regurgitation is present. Trace aortic regurgitation is noted. Mild tricuspid regurgitation. RVSP is 54 mmHg. Pulmonary hypertension is moderate   Physiologic and/or trace pulmonic regurgitation present. Technically good quality study. Compared to prior echo, changes noted. IMPRESSIONS:  Active Problems:    Syncope and collapse    Severe protein-calorie malnutrition (HCC)  Resolved Problems:    * No resolved hospital problems. *      RECOMMENDATIONS:  Attempt to wean from ventilator and pressors as tolerated carefully monitor renal function maintaining systolic blood pressure >144 mmHg. once blood pressure is stable sub-sacubitril valsartan and low-dose nitrates as tolerated.     I have spent more than  30 critical care minutes face to face with Beny Benavides and reviewing notes and laboratory data, with greater than 50% of this time instructing and counseling the patient's  daughter regarding my findings and recommendations and I have answered all questions as posed to me by Mr. Karina Mcdaniels  daughter. Abi Hurt, DO FACP,FACC,FSCAI      NOTE:  This report was transcribed using voice recognition software.   Every effort was made to ensure accuracy; however, inadvertent computerized transcription errors may be present

## 2020-07-06 NOTE — PROGRESS NOTES
Oral BID    isosorbide dinitrate  10 mg Oral TID    [Held by provider] spironolactone  25 mg Oral Q MWF    aspirin  81 mg Oral Daily    ipratropium-albuterol  3 mL Inhalation Q4H    levothyroxine  112 mcg Oral Daily        Current Meds:  Current Facility-Administered Medications   Medication Dose Route Frequency Provider Last Rate Last Dose    carvedilol (COREG) tablet 1.5625 mg  1.5625 mg Oral BID  Say Cordova MD   1.5625 mg at 07/06/20 0839    cefTRIAXone (ROCEPHIN) 1 g in sterile water 10 mL IV syringe  1 g Intravenous Q24H Say Cordova MD   1 g at 07/05/20 1152    ferrous sulfate (IRON 325) tablet 325 mg  325 mg Oral BID  Say Cordova MD   325 mg at 07/06/20 6140    furosemide (LASIX) 100 mg in sodium chloride 0.9 % 100 mL infusion  5 mg/hr Intravenous Continuous Sandra MD Sailaja 5 mL/hr at 07/05/20 1916 5 mg/hr at 07/05/20 1916    sodium chloride flush 0.9 % injection 10 mL  10 mL Intravenous 2 times per day Rowena Orta MD   10 mL at 07/06/20 0925    sodium chloride flush 0.9 % injection 10 mL  10 mL Intravenous PRN Rowena Orta MD   10 mL at 07/06/20 0840    acetaminophen (TYLENOL) tablet 650 mg  650 mg Oral Q6H PRN Rowena Orta MD        Or   [employer] acetaminophen (TYLENOL) suppository 650 mg  650 mg Rectal Q6H PRN Rowena Orta MD        polyethylene glycol (GLYCOLAX) packet 17 g  17 g Oral Daily PRN Rowena Orta MD        promethazine (PHENERGAN) tablet 12.5 mg  12.5 mg Oral Q6H PRN Rowena Orta MD        Or    ondansetron [facility]) injection 4 mg  4 mg Intravenous Q6H PRN Rowena Orta MD        pantoprazole (PROTONIX) injection 40 mg  40 mg Intravenous Daily Rowena Orta MD   40 mg at 07/06/20 3311    And    sodium chloride (PF) 0.9 % injection 10 mL  10 mL Intravenous Daily Rowena Orta MD   10 mL at 07/06/20 0925    norepinephrine (LEVOPHED) 16 mg in dextrose 5% 250 mL infusion  10 mcg/min Intravenous Continuous Boby Mckinley MD   Stopped at 07/04/20 2320    propofol injection  10 mcg/kg/min Intravenous Titrated Avril Byers MD   Stopped at 07/06/20 2724    apixaban (ELIQUIS) tablet 2.5 mg  2.5 mg Oral BID Avril Byers MD   2.5 mg at 07/06/20 8439    midazolam (VERSED) injection 2 mg  2 mg Intravenous Q4H PRN Avril Byers MD        fentaNYL 5 mcg/ml in 0.9%  ml infusion  25 mcg/hr Intravenous Continuous Avirl Byers MD   Stopped at 07/06/20 4697    mineral oil-hydrophilic petrolatum (HYDROPHOR) ointment   Topical BID PRN Joe Pena MD        isosorbide dinitrate (ISORDIL) tablet 10 mg  10 mg Oral TID Keesha Kill, DO   10 mg at 07/06/20 7517    [Held by provider] spironolactone (ALDACTONE) tablet 25 mg  25 mg Oral Q MWF Keesha Kill, DO   25 mg at 07/01/20 2000    aspirin chewable tablet 81 mg  81 mg Oral Daily Joe Pena MD   81 mg at 07/06/20 0839    ipratropium-albuterol (DUONEB) nebulizer solution 3 mL  3 mL Inhalation Q4H Joe Pena MD   3 mL at 07/06/20 0830    levothyroxine (SYNTHROID) tablet 112 mcg  112 mcg Oral Daily Joe Pena MD   112 mcg at 07/06/20 0703    perflutren lipid microspheres (DEFINITY) injection 1.65 mg  1.5 mL Intravenous ONCE PRN Keesha Kill, DO         Facility-Administered Medications Ordered in Other Encounters   Medication Dose Route Frequency Provider Last Rate Last Dose    iohexol (OMNIPAQUE 240) injection 50 mL  50 mL Oral ONCE PRN Basem A Ashleigh        ioversol (OPTIRAY) 68 % injection 120 mL  120 mL Intravenous ONCE PRN Basem A Ashleigh           Diet:  DIET TUBE FEED CONTINUOUS/CYCLIC NPO; STANDARD WITH FIBER; Nasogastric; 10; 55     EXAM:  General: intubated/sedated  Eyes: PERRL. No sclera icterus. No conjunctival injection. ENT: No discharge. Pharynx clear. Neck: Trachea midline. Normal thyroid. Lungs: coarse breath sounds  CV: Regular rate. Regular rhythm. No murmur or rub. .   Abd: Non-tender. Non-distended. No masses. No organmegaly. Normal bowel sounds. Skin: Warm and dry. No nodule on exposed extremities. No rash on exposed extremities. Ext: No cyanosis, clubbing, 2+ bilateral pitting legs edema   Neuro: sedated       Results:   CBC:   Recent Labs     07/04/20 0400 07/05/20 0530 07/06/20  0450   WBC 8.0 7.3 7.9   HGB 8.8* 8.7* 8.1*    178 156      BMP:    Recent Labs     07/05/20 0530 07/05/20  1810 07/06/20  0450    146 143   K 3.7 4.0 3.8    101 100   CO2 27 30* 29   BUN 55* 51* 49*   CREATININE 2.0* 2.0* 1.9*   GLUCOSE 113* 101* 99       Hepatic:   Recent Labs     07/04/20 0400 07/05/20 0530 07/06/20  0450   AST 29 26 24   ALT 14 13 12   BILITOT 1.2 1.2 1.5*   ALKPHOS 67 70 67      Troponin:   Recent Labs     07/03/20 2000 07/04/20 0400 07/04/20  1720   TROPONINI 0.17* 0.19* 0.19*      BNP: No results for input(s): BNP in the last 72 hours. Lipids: No results for input(s): CHOL, HDL in the last 72 hours. Invalid input(s): LDLCALCU   ABGs: No results found for: PHART, PO2ART, HIV6HMF   INR: No results for input(s): INR in the last 72 hours. Assessment/Plans    1. Acute kidney injury on top of stage III chronic kidney disease with baseline serum creatinine 1.3-1.5 reflecting decreased renal perfusion with hypotension requiring intravenous norepinephrine (now off norepinephrine) also reflecting the hemodynamic effect of HFrEF with possible cardiorenal syndrome.     -Cr stable at present; continue to monitor  2. Acute hypoxic respiratory failure, in setting of cardiac arrest, requiring mechanical ventilation   -Gradual wean; possible extubation today  3.s/p Cardiac arrest in setting of type II  MI   -OK to start entresto next 24-48, once  aggressive diuresis is decreased   -continue with lasix drip; stil with signs of peripheral edema     4. .Anemia: Could be related to chronic kidney disease.  Transfuse as needed.  Patient is on Eliquis; moonitor.      5. Aspiration pneumonia; GNR in sputum   -On Ceftriaxone    D/W Resident     Aidee Andrade MD

## 2020-07-06 NOTE — PROGRESS NOTES
Physical Therapy    Physical Therapy Re Assessment     Name: Beny Benavides  : 1929  MRN: [id number]    Referring Provider:  Yissel Montelongo MD    Date of Service: 2020    Evaluating PT:  Vinnie Hanson, PT, DPT [id number]     Room #:  1273/4668-W  Diagnosis:  Syncope and collapse   Pertinent PMHx: HTN, CAD, bronchitis, aortic aneurysm, anemia   Precautions:  Falls, Vent via ETT, OGT, 2 pt restraint   Procedures: Intubated 7/3 following code blue  Equipment Recommendations:  [employer]    Re-evaluation on  d/t change in medical status with transfer to intensive care and intubation      SUBJECTIVE:  Pt lives with son and daughter in a 2 story home with 3 stair(s) to enter and 1 rail(s). Bed is on 1st floor and bath is on 1st floor. Pt ambulated with no AD PTA - owns [employer]. Pt independent for ADL performance. OBJECTIVE:   Re Evaluation  Date: 20 Treatment Short Term/ Long Term   Goals   AM-PAC 6 Clicks      Was pt agreeable to Eval/treatment? Yes      Does pt have pain? No c/o pain      Bed Mobility  Rolling: NT  Supine to sit:  Max A  Sit to supine: Max A x2  Scooting: Min A to EOB   Rolling: SBA  Supine to sit: SBA  Sit to supine: SBA  Scooting: SBA   Transfers Sit to stand: NT  Stand to sit: NT  Stand pivot: NT  Sit to stand: SBA  Stand to sit: SBA  Stand pivot: SBA with [employer]    Ambulation    NT  >50 feet with [employer] SBA   Stair negotiation: ascended and descended  NT  >2 steps with 2 rail Min A   ROM BUE:  Per OT eval  BLE:  WFL     Strength BUE:  Per OT eval   BLE:  Grossly 3/5      Balance Sitting EOB:  Mod A  Dynamic Standing:  NT  Sitting EOB:  Independent   Dynamic Standing:  SBA     Pt is A & O   RASS:  -1  CAM-ICU:  Positive   Sensation:  Pt denies numbness and tingling to extremities  Edema:  Unremarkable     Vitals:  Blood Pressure at rest 106/65 Blood Pressure post session 128/78   Heart Rate at rest 94 bpm Heart Rate post session 91 bpm   SPO2 at rest 100% on vent SPO2 post session 100% on vent Vent settings: PSV, FiO=40%, PEEP=5    Functional Status Score-Intensive Care Unit (FSS-ICU)   Rolling --/7   Supine to sit transfer 2/7   Unsupported sitting  3/7   Sit to stand transfers 0/7   Ambulation 0/7   Total  5/35     Therapeutic Exercises:     BLE ROM at EOB   B LAQs AAROM x5 reps   B Ankle pumps AAROM x5 reps     Patient education  Pt educated on safety during functional mobility, importance of mobility in ICU, ICU delirium  Pt's son educated on ICU delirium, PT POC    Patient response to education:   Pt verbalized understanding Pt demonstrated skill Pt requires further education in this area   No d/t ett  Somewhat  Yes      ASSESSMENT:    Comments:  Pt received supine and agreeable to PT re evaluation with OT collaboration. Pt cleared for participation by RN prior to session. Vitals monitored during session. Pt's son present. Extensive line management required. Pt able to assist with bed mobility. Performed EOB activities and LE ROM. Pt follows about 10% of commands at EOB. A bit lethargic. Required oral suction. Assisted back to supine when fatigued. Scooted up in bed. Pt left chair position with restraints donned with call button in reach, lines attached, and needs met. Son present on exit. Discussed pt case at interdisciplinary rounds in AM. Pt would benefit from continued PT services at discharge. Treatment:  Patient practiced and was instructed in the following treatment:     Bed mobility training - pt given verbal and tactile cues to facilitate proper sequencing and safety during rolling and supine<>sit as well as provided with physical assistance to complete task     Sitting EOB for >15 minutes for upright tolerance, postural awareness and BLE ROM    Skilled positioning - Pt placed in the chair position with pillows utilized to facilitate upright posture, joint and skin integrity, and interaction with environment.     Non-pharmacological treatment and prevention of ICU

## 2020-07-06 NOTE — FLOWSHEET NOTE
Pt continues to pull at lines and tubes despite verbal redirection. Bilateral soft wrist restraints continued for patient safety. Will continue to monitor.

## 2020-07-06 NOTE — PLAN OF CARE
Problem: Restraint Use - Nonviolent/Non-Self-Destructive Behavior:  Goal: Absence of restraint-related injury  Description: Absence of restraint-related injury  7/5/2020 2316 by Denise Momin RN  Outcome: Met This Shift     Problem: Restraint Use - Nonviolent/Non-Self-Destructive Behavior:  Goal: Absence of restraint indications  Description: Absence of restraint indications  7/5/2020 2316 by Denise Momin RN  Outcome: Not Met This Shift

## 2020-07-06 NOTE — PROGRESS NOTES
OCCUPATIONAL THERAPY RE-EVALUATION     Date:2020  Patient Name: Radha Reina  MRN: [id number]  : 1929  Room: [facility]    Evaluating OT: Debby Ta OTR/L  Referring Provider: Reilly Christine MD    -Patient re-evaluated s/p change in medical status and subsequent intubation    AM-PAC Daily Activity Raw Score:   Recommended Adaptive Equipment: to be determined     Comments: Based on patient's functional performance as stated below and level of assistance needed prior to admission, this therapist believes that the patient would benefit from further skilled OT following hospital stay in an effort to increase safety, functional independence, and quality of life. Diagnosis: Syncope and collapse [R55]   Surgery/Procedures: Intubated 7/3 following code blue   Pertinent Medical History: HTN, CAD, bronchitis, aortic aneurysm, anemia     Precautions:  Falls, vent via ETT     Home Living: Pt lives son and daughter in a 2 story home with 3 step(s) to enter and 1 rail(s); bed/bath on main floor. Bathroom setup: tub shower with safety bar  Equipment owned: ww, BSC, shower chair    Prior Level of Function: supervision with ADLs and with IADLs; using no device for ambulation. Driving: no  Occupation: retired    Pain Level: does not indicate pain  Cognition: A&O: 1/4 (answers to name, difficult to assess d/t vent status and RASS level); Follows 1 step directions <10% of the time   Memory: NT   Sequencing: P   Problem solving: P   Judgement/safety: P   RASS: -2  CAM-ICU: positive     Functional Assessment:   Initial Eval Status  Date: 20 Treatment Status  Date: STG=LTG (~5-14  days)     Feeding DEP       improve self feeding task to set up A   Grooming DEP  To wash hands at EOB; [place] assistance needed, max cues for initiation.     improve grooming/hygiene tasks to set up A while seated/standing at sink    UB Dressing DEP  To doff/josep gown in supported sitting    improve UB dressing to min A   LB Dressing demonstrates significant difficulties with completion of BADLs and IADLs. Factors contributing to these difficulties include impaired cognition, ventilator dependence, decreased endurance, and generalized weakness. Required co-session with PT due to medical acuity and level of physical assistance needed. As noted above, patient likely to benefit from further OT intervention to increase independence, safety, and overall quality of life. Treatment:   · Bed mobility: Facilitated bed mobility with cues for proper body mechanics and sequencing to prepare for ADL completion. · Functional transfers: Facilitated transfers from various surfaces with cues for body alignment, safety and hand placement. · ADL completion: Self-care retraining for the above-mentioned ADLs; training on proper hand placement, safety technique, sequencing, and energy conservation techniques. · Therapeutic Exercises: B UE AROM/AAROM completed at all levels to maintain strength/flexibility and to decrease edema/contractures to enhance ADL completion. · Postural Balance: Sitting balance retraining to improve righting reactions with postural changes during ADLS. · Cognitive/Perceptual training: retraining exercises to improve attention, mentation, and spatial awareness for ADLs & transfers. · Skilled positioning: Proper positioning to improve interaction with environment, overall functioning and decrease/prevent edema and contractures. · Delirium Prevention/Management: Implementation of non-pharmacologic interventions for delirium prevention incorporated throughout session to patient and family. Including environmental and sensory modifications to decrease patient's internal distraction caused by delirium, and improve overall mentation.     Eval Complexity:   · Re-Evaluation    Assessment of current deficits   Functional mobility [x]  ADLs [x] Strength [x]  Cognition [x]  Functional transfers  [x] IADLs [x] Safety Awareness [x]  Endurance

## 2020-07-06 NOTE — PROGRESS NOTES
20  1730   ORG Klebsiella aerogenes*     Recent Labs     20  2108   BLOODCULT2 24 Hours no growth       Recent Labs     20  1730   ORG Klebsiella aerogenes*       Recent Labs     20  1730   CULTRESP Oral Pharyngeal Lani reduced*  Light growth     Recent Labs     20  1736   LABURIN Growth not present         Objective:   Vitals:   Vitals:    20 1200   BP: 116/71   Pulse: 83   Resp: 13   Temp: 98.4 °F (36.9 °C)   SpO2: 100%      TEMP:Current: Temp: 98.4 °F (36.9 °C)  Max: Temp  Av.7 °F (36.5 °C)  Min: 97.2 °F (36.2 °C)  Max: 98.7 °F (37.1 °C)    BP Range: Systolic (19WAU), HVZ:65 , Min:86 , LFX:236     Diastolic (45NDU), TCU:87, Min:51, Max:74        General appearance: alert, appears stated age and cooperative  In no acute distress weak phonation  Skin: No rashes or lesions  HEENT: mucous membranes are moist  Neck: No JVD  Lungs: symmetrical expansion, clear to auscultation, no use of accessory muscles  Heart: S1S2 no murmurs,  Abdomen: soft, non tender,   Extremities: no peripheral edema  Neurologic: Alert, oriented times 3,  Affect: pleasant    98% on nasal cannula oxygen  Assessment:   Patient Active Problem List:    79 yo M presents from PCPs office with  H/o HTN, thyroid disease, CAD, A. fib flutter 1 year ago/p CV  where he had a syncope on . He had a skin tear over his right lower extremity   on 5 L     x-ray with bilateral effusions   ultrasound lower extremities no DVT   possible perfusion defects in the lower lobes  7/3 cardiac arrest x 6 min. Able to follow commands on ventilator. Thoracentesis 1200 cc removed. Put on Lasix drip EF 38%   sedated on propofol fentanyl. On norepinephrine and Lasix drip. Chest x-ray showing left base consolidation moderate small effusions   self extubated    1. Cardiac arrest 7/3 Syncope and collapse on presentation  2.  S/p respiratory failure  Intubated from 7/3- now on nasal cannula oxygen  3. Pleural effusions left 1200 cc removed 7/3 thoracentesis exudative  4. Klebsiella pneumonia on ceftriaxone  5. EF 38% RSVF mildly reduced RSVP 54  6. recurrent persistent atrial flutter-fibrillation s/p CV 1 year ago. 7. Anticoagulation restarted   8. CAD   9. stage 3 chronic kidney disease   10. Anemia  11. GERD   12. Severe protein-calorie malnutrition     Plan:   1. Wean O2  2. Bedside swallow study  3.  Still need to work-up the cause of syncope and arrest      Shari ReedWashington [facility]

## 2020-07-06 NOTE — PLAN OF CARE
Problem: Restraint Use - Nonviolent/Non-Self-Destructive Behavior:  Goal: Absence of restraint indications  Description: Absence of restraint indications  7/6/2020 1622 by Margarito Rhoades  Outcome: Completed     Problem: Restraint Use - Nonviolent/Non-Self-Destructive Behavior:  Goal: Absence of restraint-related injury  Description: Absence of restraint-related injury  7/6/2020 1622 by Margarito Rhoades  Outcome: Completed

## 2020-07-06 NOTE — PROGRESS NOTES
Juana Diaz alarm start going off while down the prieto. This RN walked into the room to find that the patient had self-extubated. Respiratory called immediately and nasal cannula placed on patient right away. Suctioned and bilateral breath sounds were noted. No stridor present. Pt resting well.

## 2020-07-06 NOTE — PROGRESS NOTES
Palliative Care Department  Palliative Care Progress Note  Provider: 18 [facility] Day: 8    Referring Provider:  Dr. Delaney Wetzel    Reason for Consult:  [x]  Code status Discussion  [x]  Assist with goals of care  [x]  Psychosocial support  []  Symptom Management  []  Advanced Care Planning    Chief Complaint: Marylee Cowman is a 80 y.o. male with chief complaint of syncope, respiratory failure, cardiac arrest    Assessment/Plan      Active Hospital Problems    Diagnosis Date Noted    Severe protein-calorie malnutrition ([facility] [address] [E43] 07/03/2020    Syncope and collapse [R55] 06/29/2020       Respiratory failure/Cardiopulmonary arrest:   -  Per ICU/Pulm   -  Mechanically ventilated   -  Weaning as able    Syncope:   -  Cardiology following closely    Palliative Care Encounter/Recommendations:      - Goals of care: live longer, improve or maintain function/quality of life, preserve independence/autonomy/control and continue current management      - Code Status: full code: I reviewed with the patient's daughter that given multiple medical co-morbidities in the setting of advanced age, if the patient were to have a cardiac arrest, the chances of surviving CPR may be low. We also reviewed that if they survived cardiac arrest, the patient may not return to prior level of function. This is understood. - Discussed goals with daughter, she does state that her father would not wish to be maintained long term with life support or dependent care. She and the family are open to further discussions regarding goals and code pending clinical progression. Subjective:     HPI:  Marylee Cowman is a 80 y.o. male with significant past medical history of Anemia, Aortic Anuerysm, CAD, HPN, Thyroid disease.  The patient has been noted to experience persistent fatigue and dyspnea hence consulted with his internist. While in the clinic, his daughter witnessed the patient lose consciousness and slump on his chair. The patient was then immediately brought to the emergency department. Workup showed elevated Troponin (0.09), D-Dimer (972) and BNP. He also had Pleural effusion on x-ray. He was admitted under the care of his primary internist and was subsequently referred to cardiology for workup of his syncopal episode. Cardiology ordered a Lung perfusion scan and doppler US of the lower extremity to investigate the elevated d-dimer  He was negative for lower extremity thrombi. Patient was also referred to Pulmonology for the pleural effusion. Over the course of his hospital stay patient was stable but had fatigue. On his 5th day of admission the patient was found by his nurse having difficult of breathing until he eventually became unresponsive with no pulse hence code blue was called. Chest compressions were immediately started. ROSC was achieved after 6 minutes. Medications given include: 1 mg epinephrine, 1 gm Cacl, 10 units Insulin, half amp of d50, 50 mEq bicarb, 2 versed. ABG obtained revealed: pH 7.19, CO2 75.6, O2 56.3, HCO3 28.7 K 5.8 Patient was intubated and transferred to the ICU. Palliative medicine has been consulted to assist with goals of care, code status, and family support. Subjective/Events/Discussions:  7/4/2020:  Mr. Marissa Robledo was seen at the bedside, no family present. He is mechanically ventilated and sedated, though does wake some with stimulation. He is on levophed drip as well as lasix drip. Will try and meet with family for support and discuss goals/code as able. 7/5/2020:   Ms. Marissa Robledo seen at the bedside, and he continues to be mechanically ventilated, sedated, and continues with vasopressor support as well as diuretic therapy. He is waking some, and mechanical ventilator weaning continues as per the ICU team.  I met with the patient's daughter Cathleen Valladares, who states she is the medical power of .   Patient lives with his son Satnam Bradley locally, the daughter lives in [place], but travels here frequently to visit and continue to provide support for her father. I introduced myself, palliative medicine, and I discussed the role palliative medicine would have in her father's care. Much time was spent providing support. We discussed medical history and known wishes. At this time she knows her father would want everything to be provided to allow for an opportunity for full recovery. She does admit she understands that full recovery to his prior baseline may not be completely possible, but she is hopeful that he will be able to recover to a quality of life of which he would be agreeable to. She does states she knows her father would not wish to be kept alive via life support long-term, and she does not feel that he would wish to be completely dependent upon care in a skilled nursing facility, especially if his cognitive status is significantly impaired. Regarding resuscitation, she does wish for full resuscitation to continue, understands that further episodes of cardiac arrest do pose a risk for either being unsuccessful, or causing further decline and potential for him not to be able to return to his prior baseline. She is appreciative of the support provided, will appreciate continued support as her father continues to recover.   She states that she and her family will be open to further conversations regarding goals of care and CODE STATUS pending her father's further clinical progression, and the palate medicine team will continue to provide ongoing support and assist as needed pending his clinical progression.    - Family Meeting:   Participants:Child   Family meeting was held to discuss:goals of care, prior expressed wishes and advanced care planning     Past Medical History:   Diagnosis Date    Anemia 05/2010    Aneurysm of aorta ([facility] [address] 1990    Bronchitis 2014    [facility] no cardiac physicians    CAD (coronary artery disease)     right ventricular branch of the RCA    Hypertension     Thyroid disease        Past Surgical History:   Procedure Laterality Date    CARDIOVERSION  07/2019    CATARACT REMOVAL  08/2011,10/2011    COLONOSCOPY  01/2016    DIAGNOSTIC CARDIAC CATH LAB PROCEDURE  08/2004    HEMORRHOID SURGERY      TRANSESOPHAGEAL ECHOCARDIOGRAM  07/30/2019    Dr Mosqueda Dear       Family History   Family history unknown: Yes       Allergies   Allergen Reactions    Reglan [Metoclopramide] Other (See Comments)     tremors    Other      Flu Vaccine  Pneumococcal Vaccines       ROS: Pt. Unable to participate    Objective:     Physical Exam  /72   Pulse 80   Temp 97.2 °F (36.2 °C) (Bladder)   Resp 14   Ht 5' 6\" (1.676 m)   Wt 138 lb 14.2 oz (63 kg)   SpO2 100%   BMI 22.42 kg/m²     Gen:  Elderly, sedated, in no acute distress  HEENT:  Normocephalic, conjunctiva pink, no drainage, mucosa moist  Neck:  Supple  Lungs:  Mechanically ventilated, CTA bilaterally, no audible rhonchi or wheezes noted  Heart[de-identified] RRR, no murmur, rub, or gallop noted during exam  Abd:  Soft, non tender, non distended, BS+  :  gaston  M/S/Ext:  no edema, pulses present  Skin:  Warm and dry  Neuro:  PERRL, sedated    Current Medications:  Inpatient medications reviewed: yes  Home Medications reviewed: yes    Results/Verification of Data Review  Objective data reviewed: labs, images, records, medication use, vitals and chart      - Advanced Directives: unkown   -Surrogate/Legal NOK: Child    Contacts:  Jeffery Espana () and Miguel Card [phone]    - Spiritual assessment: No spiritual distress identified     - Bereavement and grief: to be determined    - Discharge planning: to be determined    - Prognosis: unknown    - Referrals to: none today    Time/Communication  Greater than 50% of time spent, total 35 minutes in counseling and coordination of care at the bedside regarding goals of care, diagnosis and prognosis and see above.     Gallito ARROYO-CNP  Palliative Medicine    Discussed patient and the plan of care with the other IDT members of Palliative Care, and with floor nurse. Thank you for allowing Palliative Medicine to participate in the care of Nicol Barreto. Note: This report was completed using [employer] voiced recognition software. Every effort has been made to ensure accuracy; however, inadvertent computerized transcription errors may be present.

## 2020-07-06 NOTE — PROGRESS NOTES
[address] [facility]  Department of Internal Medicine   Internal Medicine Residency   MICU Progress Note    Patient:  Marni Mendes 80 y.o. male  MRN: [id number]     Date of Service: 7/6/2020    Allergy: Reglan [metoclopramide] and Other    Subjective       Patient was seen at bedside in am    Patient lorenzo intubated and sedated   No issues overnight   Off pressors   Weaning trial today with sedation vacation   Continue diuresis     Objective   I & O - 24hr:     Intake/Output Summary (Last 24 hours) at 7/6/2020 1226  Last data filed at 7/6/2020 1200  Gross per 24 hour   Intake 1326 ml   Output 2480 ml   Net -1154 ml       Vitals: /71   Pulse 83   Temp 98.4 °F (36.9 °C) (Bladder)   Resp 13   Ht 5' 6\" (1.676 m)   Wt 138 lb 14.2 oz (63 kg)   SpO2 100%   BMI 22.42 kg/m²        Constitutional: Patient is sedated and intubated.  Head: Normocephalic and atraumatic.  Eyes:  PERRL. conjunctiva normal, (-) scleral icterus. Mucus membranes moist.   Mouth: Intubated.  Neck: (-)  swelling, (-) JVD   · Respiratory: Lungs clear to auscultation bilaterally. (-)  wheezes, (-)  rales, (-)  rhonchi. · Cardiovascular: RRR. (-)  murmurs, (-) gallops,  (-) rubs. S1 and S2 were normal.   · GI:  Abdomen soft, non-tender, non-distended. (+) BS. (-)  rebound, (-) guarding, (-) rigidity. · Extremities: Warm and well perfused. (-) clubbing, (-) cyanosis. 2+ distal pulses. (-) peripheral edema. + sacral edema    Neurologic:   Currently sedated.   (+)Follows commands. (+) Withdrawal from pain. (+)gag reflex         Lines     Site Date/Day    Art line   None    TLC R IJ 3   PICC None    Hemoaccess None      ABG:     Lab Results   Component Value Date    PH 7.499 07/06/2020    PCO2 35.2 07/06/2020    PO2 133.5 07/06/2020    HCO3 26.8 07/06/2020    BE 3.6 07/06/2020    THB 10.5 07/06/2020    O2SAT 98.6 07/06/2020        Medications     Current Facility-Administered Medications   Medication Dose Route Frequency Provider Last Rate Last Dose    carvedilol (COREG) tablet 1.5625 mg  1.5625 mg Oral BID  Genie Georges MD   1.5625 mg at 07/06/20 0839    cefTRIAXone (ROCEPHIN) 1 g in sterile water 10 mL IV syringe  1 g Intravenous Q24H Genie Georges MD   1 g at 07/06/20 1048    ferrous sulfate (IRON 325) tablet 325 mg  325 mg Oral BID  Genie Georges MD   325 mg at 07/06/20 5016    furosemide (LASIX) 100 mg in sodium chloride 0.9 % 100 mL infusion  5 mg/hr Intravenous Continuous Inez Padilla MD 5 mL/hr at 07/05/20 1916 5 mg/hr at 07/05/20 1916    sodium chloride flush 0.9 % injection 10 mL  10 mL Intravenous 2 times per day Rahul Pandey MD   10 mL at 07/06/20 0925    sodium chloride flush 0.9 % injection 10 mL  10 mL Intravenous PRN Rahul Pandey MD   10 mL at 07/06/20 0840    acetaminophen (TYLENOL) tablet 650 mg  650 mg Oral Q6H PRN Rahul Pandey MD        Or   Lenetta Ni acetaminophen (TYLENOL) suppository 650 mg  650 mg Rectal Q6H PRN Rahul Pandey MD        polyethylene glycol (GLYCOLAX) packet 17 g  17 g Oral Daily PRN Rahul Pandey MD        promethazine (PHENERGAN) tablet 12.5 mg  12.5 mg Oral Q6H PRN Rahul Pandey MD        Or    ondansetron [facility]) injection 4 mg  4 mg Intravenous Q6H PRN Rahul Pandey MD        pantoprazole (PROTONIX) injection 40 mg  40 mg Intravenous Daily Rahul Pandey MD   40 mg at 07/06/20 7754    And    sodium chloride (PF) 0.9 % injection 10 mL  10 mL Intravenous Daily Rahul Pandey MD   10 mL at 07/06/20 0925    norepinephrine (LEVOPHED) 16 mg in dextrose 5% 250 mL infusion  10 mcg/min Intravenous Continuous Rahul Pandey MD   Stopped at 07/04/20 2320    propofol injection  10 mcg/kg/min Intravenous Titrated Inez Padilla MD   Stopped at 07/06/20 1035    apixaban (ELIQUIS) tablet 2.5 mg  2.5 mg Oral BID Inez Padilla MD   2.5 mg at 07/06/20 0838    midazolam (VERSED) injection 2 mg  2 mg CO2 29 2020    BUN 49 2020    CREATININE 1.9 2020    GFRAA 40 2020    LABGLOM 40 2020    GLUCOSE 99 2020    PROT 6.2 2020    LABALBU 2.4 2020    CALCIUM 9.3 2020    BILITOT 1.5 2020    ALKPHOS 67 2020    AST 24 2020    ALT 12 2020     Magnesium:    Lab Results   Component Value Date    MG 2.3 2020     Phosphorus:    Lab Results   Component Value Date    PHOS 4.1 2020     Last 3 Troponin:    Lab Results   Component Value Date    TROPONINI 0.19 2020    TROPONINI 0.19 2020    TROPONINI 0.17 2020     ABG:    Lab Results   Component Value Date    PH 7.499 2020    PCO2 35.2 2020    PO2 133.5 2020    HCO3 26.8 2020    BE 3.6 2020    O2SAT 98.6 2020     HgBA1c:  No results found for: LABA1C       Imaging Studies:     Xr Chest Standard (2 Vw)    Result Date: 2020  Patient MRN: [id number] : 1929 Age:  80 years Gender: Male Order Date: 2020 7:00 AM Exam: XR CHEST (2 VW) Indication:   pleural effusion pleural effusion Comparison: Chest PA and lateral, 2020 FINDINGS: Moderate right and large left pleural effusions are seen. The heart borders are obscured by the surrounding effusions. The upper lungs are clear and without any significant pulmonary vascular congestion. No pneumothorax is seen. Moderate right and large left pleural effusions. Xr Chest Standard (2 Vw)    Result Date: 2020  Patient MRN: [id number] : 1929 Age:  80 years Gender: Male Order Date: 2020 5:15 PM Exam: XR CHEST (2 VW) Number of Images: 2 view Indication:   sob sob Comparison: 2019 Findings: There is a suggestion of bilateral pleural effusions The heart is enlarged. Lung fields demonstrate patchy bilateral infiltrates which could be related to pulmonary edema.  The aorta is tortuous and ectatic     Cardiomegaly Tortuous aorta There are patchy infiltrates seen throughout both the lung fields. Pulmonary edema could have this appearance There are bilateral pleural effusions present. Nm Lung Scan Perfusion Only    Result Date: 2020  Patient MRN: [id number] : 1929 Age:  80 years Gender: Male Order Date: 2020 10:00 AM Exam: NM LUNG SCAN PERFUSION ONLY Number of Images:   2  views Indication:   syncope and elevated d dimer syncope and elevated d dimer Comparison: 2020 Findings: The patient received 7.9 millicuries of technetium MAA. The chest radiograph demonstrates what appears to be large bilateral infiltrates and effusions. There is relatively limited aeration of the upper lobes Perfusion images reveal segmental perfusion defects. There is a defect in the inferior right lobe possibly pleural effusion and infiltrate. There is a defect in the left lower lobe again possibly effusion and infiltrate     Segmental perfusion defects, in the left lower lobe and right lower lobe. Possible defects from infiltrate and effusions, given the appearance of the chest x-ray. Pulmonary embolism cannot be excluded. ALERT:  THIS IS AN ABNORMAL REPORT    Xr Chest Portable    Result Date: 7/3/2020  Patient MRN: [id number] : 1929 Age:  80 years Gender: Male Order Date: 7/3/2020 2:45 PM Exam: XR CHEST PORTABLE Indication:   s/p left thoracentesis. exclude PTX s/p left thoracentesis. exclude PTX Comparison: Chest radiograph, earlier today, 7:01 a.m. FINDINGS: Since earlier chest radiograph, the patient has undergone left-sided thoracentesis. There is decreased left-sided pleural effusion. No pneumothorax is seen. Persistent mid and lower lung consolidations concerning for pneumonia are again noted. Small to moderate right and small left pleural effusions noted. The endotracheal tube is well positioned, with the tip approximately 4.4 cm above the sarwat. Tip of the nasogastric tube is in the proximal stomach.  Tip of the right IJ central venous catheter overlies the distal SVC. 1. Status post left thoracentesis. No pneumothorax. 2. Small-to-moderate right and small left pleural effusion. 3. Prominent bilateral pulmonary opacities concerning for pneumonia. 4. Tip of the NG tube is in the proximal stomach. The side-port is at the gastroesophageal junction. Advancement recommended. 5. ET tube and right IJ CVC appear well-positioned. Xr Chest Portable    Result Date: 7/3/2020  Patient MRN: [id number] : 1929 Age:  80 years Gender: Male Order Date: 7/3/2020 5:15 AM Exam: XR CHEST PORTABLE Number of Images: 1 view Indication:   IJ placement IJ placement Comparison: July 3, 2020, 0318 hours. FINDINGS: New right-sided central venous catheter terminates in the SVC. Endotracheal and nasogastric tubes are stable. Bilateral airspace disease is stable and extensive. There may be layering pleural effusions as well. New central venous catheter on the right. Extensive bilateral airspace disease and possible layering pleural effusions as. Xr Chest Portable    Result Date: 7/3/2020  Patient MRN: [id number] : 1929 Age:  80 years Gender: Male Order Date: 7/3/2020 3:15 AM Exam: XR CHEST PORTABLE Number of Images: 1 view Indication:   intubation intubation Comparison: July 3, 2020 FINDINGS: Stable endotracheal and nasogastric tubes. Extensive bilateral airspace disease does not appear appreciably changed. There are likely layering effusions, especially on the left. Stable extensive bilateral airspace disease. Likely pleural effusions, especially on the left. Xr Abdomen For Ng/og/ne Tube Placement    Result Date: 7/3/2020  Patient MRN:  [id number] : 1929 Age: 80 years Gender: Male Order Date:  7/3/2020 3:15 AM EXAM: XR ABDOMEN FOR NG/OG/NE TUBE PLACEMENT NUMBER OF IMAGES:  1 view(s) INDICATION:  Confirmation of course of NG/OG/NE tube and location of tip of tube Confirmation of course of NG/OG/NE tube and location of tip of tube Portable? ->Yes COMPARISON: None FINDINGS: This study is confined to the upper abdomen and lower chest. There is no free air or evidence of bowel pneumatosis. There are no pathologically dilated segments of bowel. The NG tube tip is well within the gastric lumen. The chest findings are reported separately. NG tube tip in the stomach. See above. Us Dup Lower Extremities Bilateral Venous    Result Date: 2020  Patient MRN:  [id number] : 1929 Age: 80 years Gender: Male Order Date:  2020 6:45 PM EXAM: US DUP LOWER EXTREMITIES BILATERAL VENOUS INDICATION:  Elevated d dimer syncope concern for DVT leg pain Elevated d dimer syncope concern for DVT leg pain  COMPARISON: None FINDINGS: Normal color flow is seen in the veins, which demonstrate normal compressibility and augmentation. No evidence of deep venous thrombosis. Resident's Assessment and Plan     Assessment  1. Acute hypoxic respiratory failure: Currently on mechanical ventilation  2. Cardiac arrest 2/2 acute hypoxic respiratory failure with likely type II myocardial infarction: Continue aspirin and Isordil and Lasix drip, resume Coreg, S/p heparin drip   3. CAP: Cx + for Klebsiella , sensitivity to ceftriaxone   4. Permanent A fib:  On Eliquis   5. Hypothyroidism: TSH elevated, normal T4, low T3  6. Iron deficiency anemia: low iron levels, Continue replacement, Hg stable   7. Stage I FRANCOISE on CKD likely prerenal azotemia: CR stable, baseline 1.5, FeUrea 38%   8.  High anion gap metabolic acidosis with elevated lactic acid: Resolved    Plan    Continue mechanical ventilation   Daily sedation vacation   weaning trial and possible extubation daily    Consult PT OT once extubated    Hg goal 7   Will keep on PS as tolerated    Keep Mg >2 and K >4   Continue iron   Continue  apixaban    Continue ceftriaxone   Continue Lasix drip    Continue Coreg   Tolerating tube feeds      PT/OT evaluation:  Not indicated at this time    DVT prophylaxis/ GI prophylaxis: eliquis  / Protonix    [facility]    Nena Fry MD, PGY-2  Attending physician: Dr. Demario Fernandez     I personally saw, examined and provided care for the patient. Radiographs, labs and medication list were reviewed by me independently. Review of Residents documentation was conducted and revisions were made as appropriate. I agree with the above documented exam, problem list and plan of care.       CCT excluding procedures 35 minutes    Electronically signed by Piter Cabrera DO on 7/6/2020 at 4:47 PM

## 2020-07-06 NOTE — PROGRESS NOTES
Palliative Care Department  Palliative Care Progress Note  Provider: [address] [place] [address] Day: 8    Referring Provider:  Dr. Elie Butcher    Reason for Consult:  [x]  Code status Discussion  [x]  Assist with goals of care  [x]  Psychosocial support  []  Symptom Management  []  Advanced Care Planning    Chief Complaint: Nathanael Hooper is a 80 y.o. male with chief complaint of syncope, respiratory failure, cardiac arrest    Assessment/Plan      Active Hospital Problems    Diagnosis Date Noted    Severe protein-calorie malnutrition ([facility] [address] [E43] 07/03/2020    Syncope and collapse [R55] 06/29/2020       Respiratory failure/Cardiopulmonary arrest:   -  Per ICU/Pulm   -  Mechanically ventilated   -  Weaning as able   - early mobilization per ICU team/PT/OT    Syncope:   -  Cardiology following closely    Palliative Care Encounter/Recommendations:      - Goals of care: live longer, improve or maintain function/quality of life, preserve independence/autonomy/control and continue current management      - Code Status: full code: I reviewed with the patient's son, Meagan Horton, that given multiple medical co-morbidities in the setting of advanced age, if the patient were to have a recurrent cardiac arrest, the chances of surviving CPR may be low. We also reviewed that if they survived cardiac arrest, the patient may not return to prior level of function. He verbalizes understanding.     - Discussed goals with daughter, she does state that her father would not wish to be maintained long term with life support or dependent care. She and the family are open to further discussions regarding goals and code pending clinical progression. Subjective:     HPI:  Nathanael Hooper is a 80 y.o. male with significant past medical history of Anemia, Aortic Anuerysm, CAD, HPN, Thyroid disease.  The patient has been noted to experience persistent fatigue and dyspnea hence consulted with his internist. While in the clinic, his daughter witnessed the patient lose consciousness and slump on his chair. The patient was then immediately brought to the emergency department. Workup showed elevated Troponin (0.09), D-Dimer (972) and BNP. He also had Pleural effusion on x-ray. He was admitted under the care of his primary internist and was subsequently referred to cardiology for workup of his syncopal episode. Cardiology ordered a Lung perfusion scan and doppler US of the lower extremity to investigate the elevated d-dimer  He was negative for lower extremity thrombi. Patient was also referred to Pulmonology for the pleural effusion. Over the course of his hospital stay patient was stable but had fatigue. On his 5th day of admission the patient was found by his nurse having difficult of breathing until he eventually became unresponsive with no pulse hence code blue was called. Chest compressions were immediately started. ROSC was achieved after 6 minutes. Medications given include: 1 mg epinephrine, 1 gm Cacl, 10 units Insulin, half amp of d50, 50 mEq bicarb, 2 versed. ABG obtained revealed: pH 7.19, CO2 75.6, O2 56.3, HCO3 28.7 K 5.8 Patient was intubated and transferred to the ICU. Palliative medicine has been consulted to assist with goals of care, code status, and family support. Subjective/Events/Discussions:  7/4/2020:  Mr. Deangelo Gross was seen at the bedside, no family present. He is mechanically ventilated and sedated, though does wake some with stimulation. He is on levophed drip as well as lasix drip. Will try and meet with family for support and discuss goals/code as able. 7/5/2020:   Ms. Deangelo Gross seen at the bedside, and he continues to be mechanically ventilated, sedated, and continues with vasopressor support as well as diuretic therapy. He is waking some, and mechanical ventilator weaning continues as per the ICU team.  I met with the patient's daughter Riley Tapia, who states she is the medical power of .   Patient lives with his son Called patient's daughter Tony Love, who states patient would never have wanted to be kept alive long-term on ventilator, but that he did recently tell her to keep him alive as long as possible. Daughter expresses fear that patient would have recurrent cardiac arrest and suffer anoxic brain injury, which she would not want for her father. Daughter plans to talk with son tonight regarding code status and will talk again tomorrow. She is going to have her son scan and email the [facility] papers which she has at home in [facility]. - Family Meeting:   Participants:Child   Family meeting was held to discuss:goals of care, prior expressed wishes and advanced care planning       Allergies   Allergen Reactions    Reglan [Metoclopramide] Other (See Comments)     tremors    Other      Flu Vaccine  Pneumococcal Vaccines       ROS: Pt.  Unable to participate    Objective:     Physical Exam  /76   Pulse 101   Temp 98.4 °F (36.9 °C) (Bladder)   Resp 24   Ht 5' 6\" (1.676 m)   Wt 138 lb 14.2 oz (63 kg)   SpO2 98%   BMI 22.42 kg/m²     Gen:  Elderly, sedated, in no acute distress  HEENT:  Normocephalic, conjunctiva pink, no drainage, mucosa moist  Neck:  Supple  Lungs:  Mechanically ventilated, CTA bilaterally, no audible rhonchi or wheezes noted  Heart[de-identified] RRR, no murmur, rub, or gallop noted during exam  Abd:  Soft, non tender, non distended, BS+  :  gaston  M/S/Ext:  no edema, pulses present  Skin:  Warm and dry  Neuro:  PERRL, sedated    Current Medications:  Inpatient medications reviewed: yes  Home Medications reviewed: yes    Results/Verification of Data Review  Objective data reviewed: labs, images, records, medication use, vitals and chart      - Advanced Directives: unkown   -Surrogate/Legal NOK: Child    Contacts:  Adriano () and Tony Love [phone])    - Spiritual assessment: No spiritual distress identified     - Bereavement and grief: to be determined    - Discharge planning: to be determined    - Prognosis: unknown    - Referrals to: none today    Time/Communication  Greater than 50% of time spent, total 35 minutes in counseling and coordination of care at the bedside regarding goals of care, diagnosis and prognosis and see above. [address] [facility]    Discussed patient and the plan of care with the other IDT members of Palliative Care, and with floor nurse. Thank you for allowing Palliative Medicine to participate in the care of Flip Ashford.

## 2020-07-06 NOTE — PROGRESS NOTES
[facility] Quality Flow/Interdisciplinary Rounds Progress Note        Quality Flow Rounds held on July 6, 2020    Disciplines Attending:  Bedside nurse, charge nurse, , PT/OT, pharmacy, nursing unit leadership, , Medical residents    Radha Reina was admitted on 6/29/2020  3:08 PM    Anticipated Discharge Date:  Expected Discharge Date: 07/03/20    Disposition:    Eric Score:  Eric Scale Score: 14    Readmission Risk              Risk of Unplanned Readmission:        26           Discussed patient goal for the day, patient clinical progression, and barriers to discharge.   The following Goal(s) of the Day/Commitment(s) have been identified:  Sedation vacation, wean sedation, continue icu care      Dang Burdick  July 6, 2020

## 2020-07-06 NOTE — PLAN OF CARE
Problem: Falls - Risk of:  Goal: Will remain free from falls  Description: Will remain free from falls  7/6/2020 0431 by Mima Delacruz RN  Outcome: Met This Shift     Problem: Falls - Risk of:  Goal: Absence of physical injury  Description: Absence of physical injury  7/6/2020 0431 by Mima Delacruz RN  Outcome: Met This Shift     Problem: Skin Integrity:  Goal: Absence of new skin breakdown  Description: Absence of new skin breakdown  Outcome: Met This Shift     Problem: Restraint Use - Nonviolent/Non-Self-Destructive Behavior:  Goal: Absence of restraint-related injury  Description: Absence of restraint-related injury  7/6/2020 0431 by Mima Delacruz RN  Outcome: Met This Shift     Problem: Restraint Use - Nonviolent/Non-Self-Destructive Behavior:  Goal: Absence of restraint indications  Description: Absence of restraint indications  7/6/2020 0431 by Mima Delacruz RN  Outcome: Not Met This Shift

## 2020-07-06 NOTE — FLOWSHEET NOTE
Inpatient Wound Care ( Initial consult) 4425    Admit Date: 6/29/2020  3:08 PM    Reason for consult:  Legs    Significant history: Per H&P     CHIEF COMPLAINT:  syncope        HISTORY OF PRESENT ILLNESS:                   Deny Saunders is a 80 y. o. male presenting to the ED for Syncope.    History was from patient's daughter states that she was signing him up to see me in my office when she turned around found him passed out in a chair.  Patient that he is been fatigued and short of breath recently. uNno Rosa has no chest pain.  He denies fever chills or cough.  He has no melena hematochezia or abdominal pain.  He had a recent skin tear over his right lower leg he has been using topical antibiotics for.  History of DVT or PE. Findings:     07/06/20 1504   Skin Integrity   Skin Integrity   (dryness, vascular discoloration, scabs)   Location BLE   Skin Integrity Site 2   Skin Integrity Location 2   (bruising)   Location 2 BUE         Impression:  Dry skin bilateral lower extremities    Plan:Aquaphor to dry skin.   Heel protectors      Brian Barreto 7/6/2020 3:08 PM

## 2020-07-06 NOTE — PROGRESS NOTES
Patient self extubated at 1450 and patient was placed on 4L NC. No stridor present, patient breath sounds were bilaterally clear, patient alert and oriented to place and name. Patient spo2 is 97%.

## 2020-07-07 NOTE — CARE COORDINATION
Patient remains in MICU; currently on continuous bipap with lasix gtt. Patient was on 4L nc this am, but when PT/OT started working with patient his BP dropped to 50/40 and later went unresponsive. I met with patient's daughter Shantel Stover at the bedside and we discussed this as well as possible need for SARAH placement at discharge. Shantel Stover is not agreeing to SARAH at this time stating she's especially against it with the coronavirus, and doesn't want him to go to a facility to just lay around. She was agreeable to accepting SARAH, HHC,DME, and private pay caretaker lists. She is going to talk with her brother as he is the primary caregiver since he lives in [facility]. She lives [place] and comes home Q 2 weeks to assist with her father's care; Shantel Stover states she is patient's HCPOA and provided paperwork which I placed on patient's soft chart. Patient currently uses 2L home oxygen through [facility]. CM/SW will continue to follow for discharge planning.

## 2020-07-07 NOTE — PATIENT CARE CONFERENCE
[facility] Quality Flow/Interdisciplinary Rounds Progress Note        Quality Flow Rounds held on July 7, 2020    Disciplines Attending:  , pt/ot, resp therapy, bedside nurse, charge nurse, nursing management    Fay Burleson was admitted on 6/29/2020  3:08 PM    Anticipated Discharge Date:  Expected Discharge Date: 07/03/20    Disposition:    Eric Score:  Eric Scale Score: 16    Readmission Risk              Risk of Unplanned Readmission:        27           Discussed patient goal for the day, patient clinical progression, and barriers to discharge. The following Goal(s) of the Day/Commitment(s) have been identified:  Continue current treatment.       Ananda Chaudhry  July 7, 2020

## 2020-07-07 NOTE — PROGRESS NOTES
PROGRESS NOTE       PATIENT PROBLEM LIST:  Active Problems:    Syncope and collapse    Severe protein-calorie malnutrition (HCC)  Resolved Problems:    * No resolved hospital problems. *      SUBJECTIVE:  Tk Melgar  remains intubated presently with his son at his bedside. REVIEW OF SYSTEMS:   Unable to accurately obtain secondary to patient's marked decompensated state and presently  sedated and intubated. Kofi Maza SCHEDULED MEDICATIONS:   mineral oil-hydrophilic petrolatum   Topical BID    carvedilol  1.5625 mg Oral BID WC    cefTRIAXone (ROCEPHIN) IV  1 g Intravenous Q24H    ferrous sulfate  325 mg Oral BID WC    sodium chloride flush  10 mL Intravenous 2 times per day    pantoprazole  40 mg Intravenous Daily    And    sodium chloride (PF)  10 mL Intravenous Daily    apixaban  2.5 mg Oral BID    isosorbide dinitrate  10 mg Oral TID    [Held by provider] spironolactone  25 mg Oral Q MWF    aspirin  81 mg Oral Daily    ipratropium-albuterol  3 mL Inhalation Q4H    levothyroxine  112 mcg Oral Daily       VITAL SIGNS:                                                                                                                          /63   Pulse 85   Temp 98.1 °F (36.7 °C) (Bladder)   Resp 20   Ht 5' 6\" (1.676 m)   Wt 138 lb 14.2 oz (63 kg)   SpO2 99%   BMI 22.42 kg/m²   Patient Vitals for the past 96 hrs (Last 3 readings):   Weight   07/06/20 0600 138 lb 14.2 oz (63 kg)   07/05/20 0603 141 lb 7 oz (64.2 kg)   07/04/20 0415 143 lb (64.9 kg)     OBJECTIVE:    HEENT:   SABINA ;sclera non-icteric, conjunctiva pink. Carotids are brisk in upstroke with normal contour. No carotid bruits. Elevated jugular venous pulsation at 45°. No palpable cervical nor supraclavicular nodes. Thyroid not palpable. Trachea midline. endotracheal tube present. Chest: Even excursion  Lungs:  Decreased breath sounds bilateral bases.  Few scattered expiratory rhonchi, no expiratory wheezes, no decreased tactile fremitus without inspiratory rales. Heart: . Irregular, regular rhythm; S1 > S2, no gallop or murmur. No clicks, rub, palpable thrills   or heaves. PMI nondisplaced, 5th intercostal space MCL. Abdomen: Soft, nontender, nondistended,  mildly protuberant, no masses or organomegaly. Bowel sounds active. Extremities: Without clubbing, cyanosis or edema. Pulses present 3+ upper extermities bilaterally; present 1+ DP and present 1+ PT bilaterally. Data:   Scheduled Meds: Reviewed  Continuous Infusions:    furosemide (LASIX) 1mg/ml infusion 5 mg/hr (07/06/20 1628)    norepinephrine Stopped (07/04/20 2320)    propofol Stopped (07/06/20 1035)    fentaNYL 5 mcg/ml in 0.9%  ml infusion Stopped (07/06/20 1035)       Intake/Output Summary (Last 24 hours) at 7/6/2020 2256  Last data filed at 7/6/2020 2100  Gross per 24 hour   Intake 495 ml   Output 1745 ml   Net -1250 ml     CBC:   Recent Labs     07/04/20  0400 07/05/20  0530 07/06/20  0450   WBC 8.0 7.3 7.9   HGB 8.8* 8.7* 8.1*   HCT 27.5* 28.0* 26.7*    178 156     BMP:  Recent Labs     07/04/20  0400 07/04/20  1025 07/04/20  1720 07/05/20  0530 07/05/20  1810 07/06/20  0450 07/06/20  1826    141 143 143 146 143 144   K 3.8 4.1 4.2 3.7 4.0 3.8 4.0   CL 99 101 101 100 101 100 102   CO2 27 27 28 27 30* 29 29   BUN 64* 61* 59* 55* 51* 49* 49*   CREATININE 1.9* 1.9* 2.0* 2.0* 2.0* 1.9* 1.9*   LABGLOM 40 40 38 38 38 40 40     ABGs:   Lab Results   Component Value Date    PH 7.499 07/06/2020    PO2 133.5 07/06/2020    PCO2 35.2 07/06/2020     INR:   No results for input(s): INR in the last 72 hours.   PRO-BNP:   Lab Results   Component Value Date    PROBNP 26,915 (H) 07/03/2020    PROBNP 34,147 (H) 06/29/2020      TSH:   Lab Results   Component Value Date    TSH 12.360 (H) 07/03/2020      Cardiac Injury Profile:   Recent Labs     07/04/20  0400 07/04/20  1720   TROPONINI 0.19* 0.19*      Lipid Profile: No results found for: TRIG, HDL, LDLCALC, CHOL Hemoglobin A1C: No components found for: HGBA1C     RAD:   Xr Chest Standard (2 Vw)    Result Date: 2020  Patient MRN: [id number] : 1929 Age:  80 years Gender: Male Order Date: 2020 5:15 PM Exam: XR CHEST (2 VW) Number of Images: 2 view Indication:   sob sob Comparison: 2019 Findings: There is a suggestion of bilateral pleural effusions The heart is enlarged. Lung fields demonstrate patchy bilateral infiltrates which could be related to pulmonary edema. The aorta is tortuous and ectatic     Cardiomegaly Tortuous aorta There are patchy infiltrates seen throughout both the lung fields. Pulmonary edema could have this appearance There are bilateral pleural effusions present. Nm Lung Scan Perfusion Only    Result Date: 2020  Patient MRN: [id number] : 1929 Age:  80 years Gender: Male Order Date: 2020 10:00 AM Exam: NM LUNG SCAN PERFUSION ONLY Number of Images:   2  views Indication:   syncope and elevated d dimer syncope and elevated d dimer Comparison: 2020 Findings: The patient received 7.9 millicuries of technetium MAA. The chest radiograph demonstrates what appears to be large bilateral infiltrates and effusions. There is relatively limited aeration of the upper lobes Perfusion images reveal segmental perfusion defects. There is a defect in the inferior right lobe possibly pleural effusion and infiltrate. There is a defect in the left lower lobe again possibly effusion and infiltrate     Segmental perfusion defects, in the left lower lobe and right lower lobe. Possible defects from infiltrate and effusions, given the appearance of the chest x-ray. Pulmonary embolism cannot be excluded.  ALERT:  THIS IS AN ABNORMAL REPORT    Us Dup Lower Extremities Bilateral Venous    Result Date: 2020  Patient MRN:  [id number] : 1929 Age: 80 years Gender: Male Order Date:  2020 6:45 PM EXAM: US DUP LOWER EXTREMITIES BILATERAL VENOUS INDICATION:  Elevated d dimer syncope concern for DVT leg pain Elevated d dimer syncope concern for DVT leg pain  COMPARISON: None FINDINGS: Normal color flow is seen in the veins, which demonstrate normal compressibility and augmentation. No evidence of deep venous thrombosis. EKG: See Report  Echo: 7/3/2020      Summary   Left ventricle grossly normal in size. Mild left ventricular concentric hypertrophy noted. Prominent papillary muscle. Global hypokinesis is noted to be moderate. Estimated left ventricular ejection fraction is 38±5%. Diastolic function could not be accurately assessed. The LAESV Index is >34 ml/m2. Mild-moderately dilated right ventricle. TAPSE decreased 0.8. Right ventricle global systolic function is moderately reduced . Physiologic and/or trace mitral regurgitation is present. Trace aortic regurgitation is noted. Mild tricuspid regurgitation. RVSP is 54 mmHg. Pulmonary hypertension is moderate   Physiologic and/or trace pulmonic regurgitation present. Technically good quality study. Compared to prior echo, changes noted. IMPRESSIONS:  Active Problems:    Syncope and collapse    Severe protein-calorie malnutrition (HCC)  Resolved Problems:    * No resolved hospital problems. *      RECOMMENDATIONS:  Wean from ventilator as tolerated. Fortunately blood pressure remains stable presently. Continues to have significant diuresis and renal function remained stable presently. Importantly, he has not sustained any significant cardiac arrhythmia since the time of his arrest. Will defer initiation of low-dose sacubitril/valsartan after discussing further with Dr. Leticia Trevino.     I have spent more than  30 critical care minutes face to face with Paul Hoffman and reviewing notes and laboratory data, with greater than 50% of this time instructing and counseling the patient's  son regarding my findings and recommendations and I have answered all questions as posed to me by Mr. Deberah Barthel son    Uzma Pagannatalia Tomer Ga,  FACP,FACC,[facility]      NOTE:  This report was transcribed using voice recognition software.   Every effort was made to ensure accuracy; however, inadvertent computerized transcription errors may be present

## 2020-07-07 NOTE — PLAN OF CARE
Problem: Falls - Risk of:  Goal: Will remain free from falls  Description: Will remain free from falls  7/7/2020 0032 by Rafy Argueta RN  Outcome: Met This Shift     Problem: Falls - Risk of:  Goal: Absence of physical injury  Description: Absence of physical injury  7/7/2020 0032 by Rafy Argueta RN  Outcome: Met This Shift     Problem: Skin Integrity:  Goal: Absence of new skin breakdown  Description: Absence of new skin breakdown  7/7/2020 0032 by Rafy Argueta RN  Outcome: Met This Shift     Problem: Musculor/Skeletal Functional Status  Goal: Absence of falls  7/7/2020 0032 by Rafy Argueta RN  Outcome: Met This Shift

## 2020-07-07 NOTE — PROGRESS NOTES
Patient went unresponsive while working with PT/OT. Placed back in the bed. Nonrebreather placed on patient. Dr. Srinivas Myers and respiratory therapy at bedside.  Orders given

## 2020-07-07 NOTE — PROGRESS NOTES
Physical Therapy    Physical Therapy Daily Treatment Note      Name: Chapin Stearns  : 1929  MRN: [id number]    Referring Provider:  Scottie Teran MD    Date of Service: 2020    Evaluating PT:  Angelo Kline PT, DPT [id number]     Room #:  1997/2651-X  Diagnosis:  Syncope and collapse   Pertinent PMHx: HTN, CAD, bronchitis, aortic aneurysm, anemia   Precautions:  Falls,, 2 pt restraint, O2  Procedures: Intubated 7/3 following code blue; self extubated    Equipment Recommendations:  [employer]    Re-evaluation on  d/t change in medical status with transfer to intensive care and intubation      SUBJECTIVE:  Pt lives with son and daughter in a 2 story home with 3 stair(s) to enter and 1 rail(s). Bed is on 1st floor and bath is on 1st floor. Pt ambulated with no AD PTA - owns [employer]. Pt independent for ADL performance. OBJECTIVE:   Re Evaluation  Date: 20 Treatment  20 Short Term/ Long Term   Goals   AM-PAC 6 Clicks     Was pt agreeable to Eval/treatment? Yes  Yes    Does pt have pain? No c/o pain  No c/o pain     Bed Mobility  Rolling: NT  Supine to sit: Max A  Sit to supine: Max A x2  Scooting: Min A to EOB  Rolling: NT  Supine to sit: Max A  Sit to supine: Dep  Scooting: Min A to EOB  Rolling: SBA  Supine to sit: SBA  Sit to supine: SBA  Scooting: SBA   Transfers Sit to stand: NT  Stand to sit: NT  Stand pivot: NT Sit to stand: Max A  Stand to sit: Max A   Stand pivot: Max A with [employer] bed>chair;  Dep with no AD chair>bed Sit to stand: SBA  Stand to sit: SBA  Stand pivot: SBA with [employer]    Ambulation    NT NT >50 feet with [employer] SBA   Stair negotiation: ascended and descended  NT NT >2 steps with 2 rail Min A   ROM BUE:  Per OT eval  BLE:  WFL     Strength BUE:  Per OT eval   BLE:  Grossly 3/5      Balance Sitting EOB:  Mod A  Dynamic Standing:  NT Sitting EOB:  Min A  Dynamic standing:  Max A Sitting EOB:  Independent   Dynamic Standing:  SBA     Pt is A & O   RASS:  -1  CAM-ICU:  Positive Sensation:  Pt denies numbness and tingling to extremities  Edema:  Unremarkable     Vitals:  Blood Pressure at rest 108/66 Blood Pressure post session --   Heart Rate at rest 90 bpm Heart Rate post session -- bpm   SPO2 at rest 98% on 4 L  SPO2 post session --   Post session vitals not recorded as pt was left under the care of nursing and medical team     Functional Status Score-Intensive Care Unit (FSS-ICU)   Rolling --/7   Supine to sit transfer 2/7   Unsupported sitting  4/7   Sit to stand transfers 2/7   Ambulation 1/7   Total  9/35     Therapeutic Exercises:     BLE ROM at EOB   B LAQs AAROM x10 reps   B Ankle pumps AAROM x10 reps     Patient education  Pt educated on safety during functional mobility    Patient response to education:   Pt verbalized understanding Pt demonstrated skill Pt requires further education in this area   No d/t ett  Somewhat  Yes      ASSESSMENT:    Comments:  Pt received supine and agreeable to PT treatment with OT collaboration. Pt cleared for participation by RN prior to session. Vitals monitored during session. Pt lethargic but cooperative and following commands. Assist of trunk during bed mobility. BP a little low at EOB but fair. Pt reports low back pain. Performed LE exercise and soft tissue mobilization to lower back. D/t pt still c/o lower back pain decision to transfer pt to supported seated position in bedside chair. Immediately upon sitting down, BP was assessed. Pt was extremely hypotensive with MAP in low 40s. Pt was quickly and dependently transferred back to bed and placed in trendelenburg. Nursing staff notified. While PT was out of room alerting nursing, upon returning to room pt was in bed not responding to OT's sternal rub and name call. Nursing staff and Dr. Julio Cesar Stapleton in room for further management.   At that time PT/OT exited and left pt under the care of medical team.      Discussed pt case at interdisciplinary rounds in AM. Pt would benefit from continued PT services at discharge as able to tolerate. Discussed at rounds in AM and discussed with case management after session. Treatment:  Patient practiced and was instructed in the following treatment:     Bed mobility training - pt given verbal and tactile cues to facilitate proper sequencing and safety during rolling and supine<>sit as well as provided with physical assistance to complete task     Sitting EOB for >10 minutes for upright tolerance, postural awareness and BLE ROM    STS and pivot transfer training - pt educated on proper hand and foot placement, safety and sequencing, and use of WW to safely complete sit<>stand and pivot transfers with hands on assistance to complete task safely  (bed>chair); Pt dependently transferred chair>bed d/t hypotension    Non-pharmacological treatment and prevention of ICU delirium - Pt oriented to date, time, time of day, place, and situation as well as provided with visual and auditory stimuli in order to improve cognition and combat effects of ICU delirium. PLAN:  Pt is making slow progress towards established goals. Continue PT POC.        Time in  0925  Time out  1005    Total Treatment Time  40 minutes     CPT codes:  [] Low Complexity PT evaluation 87144  [] Moderate Complexity PT evaluation 03507  [] High Complexity PT evaluation 07997  [] PT Re-evaluation 89532  [] Gait training 68592 - minutes  [] Manual therapy 25077 - minutes  [x] Therapeutic activities 18336 40 minutes  [] Therapeutic exercises 18349 - minutes  [] Neuromuscular reeducation 74653 - minutes     Rolf Hobson, PT, DPT  [id number]

## 2020-07-07 NOTE — PROGRESS NOTES
PT IS IN ICU, AND ICU TEAM ARE HANDLING ALL ISSUES, WILL RESUME CARE ONCE TRANSFERRED OUT OF THE UNIT

## 2020-07-07 NOTE — PROGRESS NOTES
[address]  Department of Internal Medicine   Internal Medicine Residency   MICU Progress Note    Patient:  Ralph Roberts 80 y.o. male  MRN: [id number]     Date of Service: 7/7/2020    Allergy: Reglan [metoclopramide] and Other    Subjective       Patient was seen at bedside in am    Patient self extubated in the afternoon   No acute events overnight   In the a.m. while being seen by PT patient became hypotensive at that time asymptomatic with normal heart rate   Shortly after patient became somnolent and was no longer responding to commands.  Patient was placed in reverse Trendelenburg position   Dopamine and Levophed were started and BiPAP was placed   Shortly after patient's consciousness improved    Objective   I & O - 24hr:     Intake/Output Summary (Last 24 hours) at 7/7/2020 1613  Last data filed at 7/7/2020 1500  Gross per 24 hour   Intake 314.61 ml   Output 1267 ml   Net -952.39 ml       Vitals: BP 96/65   Pulse 81   Temp 96.9 °F (36.1 °C) (Temporal)   Resp 20   Ht 5' 6\" (1.676 m)   Wt 133 lb 14.4 oz (60.7 kg)   SpO2 100%   BMI 21.61 kg/m²        Constitutional: Awake alert and oriented X3   Head: Normocephalic and atraumatic.  Eyes:  PERRL. conjunctiva normal, (-) scleral icterus. Mucus membranes moist.   Mouth: Intubated.  Neck: (-)  swelling, (-) JVD   · Respiratory: Lungs clear to auscultation bilaterally. (-)  wheezes, (-)  rales, (-)  rhonchi. · Cardiovascular: RRR. (-)  murmurs, (-) gallops,  (-) rubs. S1 and S2 were normal.   · GI:  Abdomen soft, non-tender, non-distended. (+) BS. (-)  rebound, (-) guarding, (-) rigidity. · Extremities: Warm and well perfused. (-) clubbing, (-) cyanosis. 2+ distal pulses. (-) peripheral edema. + sacral edema   · Neurologic: Cranial nerves Grossly intact follows commands      Lines     Site Date/Day    Art line   None    TLC R IJ 4   PICC None    Hemoaccess None      ABG:     Lab Results   Component Value Date    PH 7.330 07/07/2020    PCO2 51.4 07/07/2020    PO2 205.2 07/07/2020    HCO3 26.5 07/07/2020    BE 0.1 07/07/2020    THB 9.8 07/07/2020    O2SAT 99.1 07/07/2020        Medications     Current Facility-Administered Medications   Medication Dose Route Frequency Provider Last Rate Last Dose    sacubitril-valsartan (ENTRESTO) 24-26 MG per tablet 0.5 tablet  0.5 tablet Oral BID Jeremy Stewart MD        mineral oil-hydrophilic petrolatum (HYDROPHOR) ointment   Topical BID Jeremy Stewart MD        mineral oil-hydrophilic petrolatum (HYDROPHOR) ointment   Topical TID PRN Jeremy Stewart MD        carvedilol (COREG) tablet 1.5625 mg  1.5625 mg Oral BID  Jeremy Stewart MD   1.5625 mg at 07/07/20 0838    cefTRIAXone (ROCEPHIN) 1 g in sterile water 10 mL IV syringe  1 g Intravenous Q24H Jeremy Stewart MD   1 g at 07/07/20 1119    ferrous sulfate (IRON 325) tablet 325 mg  325 mg Oral BID  Jeremy Stewart MD   325 mg at 07/07/20 0838    furosemide (LASIX) 100 mg in sodium chloride 0.9 % 100 mL infusion  2.5 mg/hr Intravenous Continuous Daneil Arti Bell MD 2.5 mL/hr at 07/07/20 1410 2.5 mg/hr at 07/07/20 1410    sodium chloride flush 0.9 % injection 10 mL  10 mL Intravenous 2 times per day Richardson Waterman MD   10 mL at 07/07/20 0838    sodium chloride flush 0.9 % injection 10 mL  10 mL Intravenous PRN Richardson Waterman MD   10 mL at 07/06/20 0840    acetaminophen (TYLENOL) tablet 650 mg  650 mg Oral Q6H PRN Richardson Waterman MD        Or   Donnell Mccoy acetaminophen (TYLENOL) suppository 650 mg  650 mg Rectal Q6H PRN Richardson Waterman MD        polyethylene glycol (GLYCOLAX) packet 17 g  17 g Oral Daily PRN Richardson Waterman MD        promethazine (PHENERGAN) tablet 12.5 mg  12.5 mg Oral Q6H PRN Richardson Waterman MD        Or    ondansetron [facility]) injection 4 mg  4 mg Intravenous Q6H PRN Richardson Waterman MD   4 mg at 07/06/20 2050    pantoprazole (PROTONIX) injection 40 mg  40 mg Intravenous Daily [facility] MD Sugey   40 mg at 07/07/20 9598    And    sodium chloride (PF) 0.9 % injection 10 mL  10 mL Intravenous Daily Valeria Salazar MD   10 mL at 07/07/20 9869    apixaban (ELIQUIS) tablet 2.5 mg  2.5 mg Oral BID Bogdan Moreno MD   2.5 mg at 07/07/20 3319    midazolam (VERSED) injection 2 mg  2 mg Intravenous Q4H PRN Bogdan Moreno MD        isosorbide dinitrate (ISORDIL) tablet 10 mg  10 mg Oral TID Lodema Lose, DO   10 mg at 07/07/20 3785    [Held by provider] spironolactone (ALDACTONE) tablet 25 mg  25 mg Oral Q MWF Lodema Lose, DO   25 mg at 07/01/20 2000    aspirin chewable tablet 81 mg  81 mg Oral Daily Silke Sanchez MD   81 mg at 07/07/20 0838    ipratropium-albuterol (DUONEB) nebulizer solution 3 mL  3 mL Inhalation Q4H Silke Sanchez MD   3 mL at 07/07/20 1343    levothyroxine (SYNTHROID) tablet 112 mcg  112 mcg Oral Daily Silke Sanchez MD   112 mcg at 07/07/20 2728    perflutren lipid microspheres (DEFINITY) injection 1.65 mg  1.5 mL Intravenous ONCE PRN Lodema Lose, DO         Facility-Administered Medications Ordered in Other Encounters   Medication Dose Route Frequency Provider Last Rate Last Dose    iohexol (OMNIPAQUE 240) injection 50 mL  50 mL Oral ONCE PRN Basem A Ashleigh        ioversol (OPTIRAY) 68 % injection 120 mL  120 mL Intravenous ONCE PRN Basem A Ashleigh            Labs   CBC with Differential:    Lab Results   Component Value Date    WBC 9.0 07/07/2020    RBC 3.17 07/07/2020    HGB 8.2 07/07/2020    HCT 27.9 07/07/2020     07/07/2020    MCV 88.0 07/07/2020    MCH 25.9 07/07/2020    MCHC 29.4 07/07/2020    RDW 22.5 07/07/2020    NRBC 1.8 07/07/2020    METASPCT 0.9 07/07/2020    LYMPHOPCT 1.8 07/07/2020    PROMYELOPCT 1.7 08/30/2019    MONOPCT 2.6 07/07/2020    MYELOPCT 0.9 07/04/2020    BASOPCT 0.1 07/07/2020    MONOSABS 0.27 07/07/2020    LYMPHSABS 0.00 07/07/2020    EOSABS 0.00 07/07/2020    BASOSABS 0.00 07/07/2020     CMP:    Lab edema. The aorta is tortuous and ectatic     Cardiomegaly Tortuous aorta There are patchy infiltrates seen throughout both the lung fields. Pulmonary edema could have this appearance There are bilateral pleural effusions present. Nm Lung Scan Perfusion Only    Result Date: 2020  Patient MRN: [id number] : 1929 Age:  80 years Gender: Male Order Date: 2020 10:00 AM Exam: NM LUNG SCAN PERFUSION ONLY Number of Images:   2  views Indication:   syncope and elevated d dimer syncope and elevated d dimer Comparison: 2020 Findings: The patient received 7.9 millicuries of technetium MAA. The chest radiograph demonstrates what appears to be large bilateral infiltrates and effusions. There is relatively limited aeration of the upper lobes Perfusion images reveal segmental perfusion defects. There is a defect in the inferior right lobe possibly pleural effusion and infiltrate. There is a defect in the left lower lobe again possibly effusion and infiltrate     Segmental perfusion defects, in the left lower lobe and right lower lobe. Possible defects from infiltrate and effusions, given the appearance of the chest x-ray. Pulmonary embolism cannot be excluded. ALERT:  THIS IS AN ABNORMAL REPORT    Xr Chest Portable    Result Date: 7/3/2020  Patient MRN: [id number] : 1929 Age:  80 years Gender: Male Order Date: 7/3/2020 2:45 PM Exam: XR CHEST PORTABLE Indication:   s/p left thoracentesis. exclude PTX s/p left thoracentesis. exclude PTX Comparison: Chest radiograph, earlier today, 7:01 a.m. FINDINGS: Since earlier chest radiograph, the patient has undergone left-sided thoracentesis. There is decreased left-sided pleural effusion. No pneumothorax is seen. Persistent mid and lower lung consolidations concerning for pneumonia are again noted. Small to moderate right and small left pleural effusions noted. The endotracheal tube is well positioned, with the tip approximately 4.4 cm above the sarwat.  Tip of of tube Confirmation of course of NG/OG/NE tube and location of tip of tube Portable? ->Yes COMPARISON: None FINDINGS: This study is confined to the upper abdomen and lower chest. There is no free air or evidence of bowel pneumatosis. There are no pathologically dilated segments of bowel. The NG tube tip is well within the gastric lumen. The chest findings are reported separately. NG tube tip in the stomach. See above. Us Dup Lower Extremities Bilateral Venous    Result Date: 2020  Patient MRN:  [id number] : 1929 Age: 80 years Gender: Male Order Date:  2020 6:45 PM EXAM: US DUP LOWER EXTREMITIES BILATERAL VENOUS INDICATION:  Elevated d dimer syncope concern for DVT leg pain Elevated d dimer syncope concern for DVT leg pain  COMPARISON: None FINDINGS: Normal color flow is seen in the veins, which demonstrate normal compressibility and augmentation. No evidence of deep venous thrombosis. Resident's Assessment and Plan     Assessment  1. Acute hypoxic respiratory failure: Currently on mechanical ventilation  2. Cardiac arrest 2/2 acute hypoxic respiratory failure with likely type II myocardial infarction: Continue aspirin and Isordil and Lasix drip, resume Coreg, S/p heparin drip   3. CAP: Cx + for Klebsiella , sensitivity to ceftriaxone   4. Permanent A fib:  On Eliquis   5. Hypothyroidism: TSH elevated, normal T4, low T3  6. Iron deficiency anemia: low iron levels, Continue replacement, Hg stable   7. Stage I FRANCOISE on CKD likely prerenal azotemia: CR stable, baseline 1.5, FeUrea 38%   8.  High anion gap metabolic acidosis with elevated lactic acid: Resolved    Plan    Wean BiPAP as tolerated   Hg goal 7   Keep Mg >2 and K >4   Continue iron supplementation   Continue  apixaban    Continue ceftriaxone   Continue Lasix drip    Continue Coreg   Started on half dose Eliquis today   Tolerating tube feeds   Case discussed with nephrology      PT/OT evaluation:  Not indicated at this time    DVT prophylaxis/ GI prophylaxis: eliquis  / Protonix    Disposition: MICU Doss Merlin, MD, PGY-2  Attending physician: Dr. Hayder Mathew    I personally saw, examined and provided care for the patient. Radiographs, labs and medication list were reviewed by me independently. Review of Residents documentation was conducted and revisions were made as appropriate. I agree with the above documented exam, problem list and plan of care.       CCT excluding procedures 35 minutes    Electronically signed by Carlene Griffith DO on 7/7/2020 at 4:54 PM

## 2020-07-07 NOTE — PROGRESS NOTES
OCCUPATIONAL THERAPY  Treatment Note    Date:2020  Patient Name: Beny Benavides  MRN: [id number]  : 1929  Room: 0/-A    Evaluating OT: Jagdish Luna OTR/L  Referring Provider: Yissel Montelongo MD    -Patient re-evaluated s/p change in medical status and subsequent intubation    AM-PAC Daily Activity Raw Score:   Recommended Adaptive Equipment: to be determined     Comments: Based on patient's functional performance as stated below and level of assistance needed prior to admission, this therapist believes that the patient would benefit from further skilled OT following hospital stay in an effort to increase safety, functional independence, and quality of life. Diagnosis: Syncope and collapse [R55]   Surgery/Procedures: Intubated 7/3 following code blue   Pertinent Medical History: HTN, CAD, bronchitis, aortic aneurysm, anemia     Precautions:  Falls, , O2     Home Living: Pt lives son and daughter in a 2 story home with 3 step(s) to enter and 1 rail(s); bed/bath on main floor. Bathroom setup: tub shower with safety bar  Equipment owned: ww, BSC, shower chair    Prior Level of Function: supervision with ADLs and with IADLs; using no device for ambulation. Driving: no  Occupation: retired    Pain Level: does not indicate pain  Cognition: A&O: 1/4 (answers to name, lethargic and voice soft- difficult to assess further orientation); Follows 1 step directions <10% of the time   Memory: NT   Sequencing: P   Problem solving: P   Judgement/safety: P   RASS: -2 to 0  CAM-ICU: NT     Functional Assessment:   Initial Eval Status  Date: 20 Treatment Status  Date: 20 STG=LTG (~5-14  days)     Feeding DEP   DEP    improve self feeding task to set up A   Grooming DEP  To wash hands at EOB; [place] assistance needed, max cues for initiation.  DEP   improve grooming/hygiene tasks to set up A while seated/standing at sink    UB Dressing DEP  To doff/josep gown in supported sitting DEP   improve UB dressing to min A   LB Dressing DEP DEP   improve LB dressing to min A with AE PRN   Bathing DEP DEP   improve UB/LB bathing to mod A   Toileting DEP DEP   improve toileting task and all clothing mgmt to mod A   Bed Mobility  Supine to sit: Max A x1  Sit to supine: Max A x2 Supine to sit: Max A x1  Sit to supine: DEP improve bed mobility to min A in prep for EOB ADL tasks   Functional Transfers Sit to stand: NT  Stand to sit: NT Sit to stand: Max A  Stand to sit: Max A improve all functional transfers to min A   Functional Mobility NT NT improve functional mobility to min A with AE PRN   Balance Sitting:     Static:  CGA    Dynamic: Min A  Standing: NT Sitting:     Static:  Min A    Dynamic: Mod A  Standing: Max A demo independent dynamic sitting balance EOB during ADL tasks   Endurance/Activity Tolerance P P demo G activity tolerance/endurance during 15-20 min ADL task    Visual/  Perceptual Glasses: yes, in room              Hand dominance: R    Hearing: WFL  Sensation: No indications of numbness or tingling  Tone: WNL  Edema: unremarkable    Vitals:  Blood Pressure at rest 108/66 Blood Pressure post session --   Heart Rate at rest 90 bpm Heart Rate post session -- bpm   SPO2 at rest 98% on 4 L  SPO2 post session --   Post session vitals not recorded as pt was left under the care of nursing and medical team                             Comments: OK from RN to see patient. Session completed with PT collaboration. Upon arrival patient supine with HOB slightly elevated, pt had self-extubated one day prior. Patient hypotensive s/p bed mobility and seated EOB (MAP in low 60s), LB exercises completed to increase BP. Pt complaining of chest discomfort and back pain. SPT to bedside chair to decrease discomfort. S/p pivot, BP taken immediately in sitting with MAP in 40s, pt immediately transferred to bed and placed in supine/trendelenberg to increase blood perfusion. RN and other medical team members immediately notified.  Pt became unresponsive to sternal rub as RN in to assess. Patient the left in care of medical team for further assessment and intervention. Functional assessment/treatment limited this date d/t medical needs. Pt required cues and education as noted above for safe facilitation and completion of tasks. Therapist provided skilled monitoring of HR, O2 saturation, blood pressure and patient's response during treatment session. Prior to and at the end of session, environmental modifications/line management completed for patients safety and efficiency of treatment session. Overall, patient demonstrates significant difficulties with completion of BADLs and IADLs. Factors contributing to these difficulties include impaired cognition, increased O2 needs, decreased endurance, and generalized weakness. Required co-session with PT due to medical acuity and level of physical assistance needed. As noted above, patient likely to benefit from further OT intervention to increase independence, safety, and overall quality of life. Treatment:   · Bed mobility: Facilitated bed mobility with cues for proper body mechanics and sequencing to prepare for ADL completion. · Functional transfers: Facilitated transfers from various surfaces with cues for body alignment, safety and hand placement. · Therapeutic Exercises: B UE AROM/AAROM completed at all levels to maintain strength/flexibility and to decrease edema/contractures to enhance ADL completion. · Postural Balance: Sitting balance retraining to improve righting reactions with postural changes during ADLs. · Cognitive/Perceptual training: retraining exercises to improve attention, mentation, and spatial awareness for ADLs & transfers. · Skilled positioning: Proper positioning to improve interaction with environment, overall functioning and decrease/prevent edema and contractures.   · Delirium Prevention/Management: Implementation of non-pharmacologic interventions for delirium prevention incorporated throughout session to patient and family. Including environmental and sensory modifications to decrease patient's internal distraction caused by delirium, and improve overall mentation. Patient has made poor progress toward set goals, please continue with POC.     Treatment Time In:1325            Treatment Time Out: 1350         Treatment Charges: Mins Units   Ther Ex  [id number]     Manual Therapy Ulises Mccabe [phone] [phone]   ADL/Home Mgt [id number]     Neuro Re-ed [id number]     Group Therapy      Orthotic manage/training  [id number]     Non-Billable Time     Total Timed Treatment 25 Camden Mahajan OTR/L  [id number]

## 2020-07-07 NOTE — PROGRESS NOTES
Nephrology Attending   Inpatient Progress Note    Admit Date: 2020                                  PCP: Joe Pena MD    Patient Active Problem List   Diagnosis    Anemia    Hypertension    CAD (coronary artery disease)    GERD (gastroesophageal reflux disease)    Former smoker, stopped smoking many years ago    Atrial fibrillation ([facility]    CHF (congestive heart failure), NYHA class I, acute on chronic, combined ([facility]    Acute on chronic congestive heart failure ([facility]    TIA (transient ischemic attack)    Chronic diastolic (congestive) heart failure (HCC)    Stage 3 chronic kidney disease ([facility]    Syncope and collapse    Severe protein-calorie malnutrition (HCC)       Subjective:     : No acute issues from overnight; remains intubated  : episode of hypotension and tachycardia this am, with SOB responded ; placed back on BIPAP         Vitals:  VITALS:  BP (!) 136/98   Pulse 92   Temp 97 °F (36.1 °C) (Bladder)   Resp 20   Ht 5' 6\" (1.676 m)   Wt 133 lb 14.4 oz (60.7 kg)   SpO2 100%   BMI 21.61 kg/m²   24HR INTAKE/OUTPUT:      Intake/Output Summary (Last 24 hours) at 2020 1238  Last data filed at 2020 1200  Gross per 24 hour   Intake 266 ml   Output 1547 ml   Net -1281 ml     CURRENT PULSE OXIMETRY:  SpO2: 100 %  24HR PULSE OXIMETRY RANGE:  SpO2  Av.7 %  Min: 78 %  Max: 100 %        I/O:      I/O last 3 completed shifts:   In: 266 [P.O.:25; I.V.:241]  Out: 1325 [Urine:1325]  I/O this shift:  In: -   Out: 657 [Urine:657]    Medications:    IV:   DOPamine 2.5 mcg/kg/min (20 1141)    furosemide (LASIX) 1mg/ml infusion Stopped (20 1011)    norepinephrine Stopped (20 1041)    propofol Stopped (20 1035)    fentaNYL 5 mcg/ml in 0.9%  ml infusion Stopped (20 1035)      sacubitril-valsartan  0.5 tablet Oral BID    mineral oil-hydrophilic petrolatum   Topical BID    carvedilol  1.5625 mg Oral BID WC    cefTRIAXone (ROCEPHIN) IV  1 g Intravenous Q24H    ferrous sulfate  325 mg Oral BID     sodium chloride flush  10 mL Intravenous 2 times per day    pantoprazole  40 mg Intravenous Daily    And    sodium chloride (PF)  10 mL Intravenous Daily    apixaban  2.5 mg Oral BID    isosorbide dinitrate  10 mg Oral TID    [Held by provider] spironolactone  25 mg Oral Q MWF    aspirin  81 mg Oral Daily    ipratropium-albuterol  3 mL Inhalation Q4H    levothyroxine  112 mcg Oral Daily        Current Meds:  Current Facility-Administered Medications   Medication Dose Route Frequency Provider Last Rate Last Dose    DOPamine (INTROPIN) 800 mg in dextrose 5 % 250 mL infusion  5 mcg/kg/min Intravenous Continuous Jorgito G Toolson, DO 2.8 mL/hr at 07/07/20 1141 2.5 mcg/kg/min at 07/07/20 1141    sacubitril-valsartan (ENTRESTO) 24-26 MG per tablet 0.5 tablet  0.5 tablet Oral BID Liss Arriaza MD        mineral oil-hydrophilic petrolatum (HYDROPHOR) ointment   Topical BID Liss Arriaza MD        mineral oil-hydrophilic petrolatum (HYDROPHOR) ointment   Topical TID PRN Liss Arriaza MD        carvedilol (COREG) tablet 1.5625 mg  1.5625 mg Oral BID  Liss Arriaza MD   1.5625 mg at 07/07/20 0838    cefTRIAXone (ROCEPHIN) 1 g in sterile water 10 mL IV syringe  1 g Intravenous Q24H Liss Arriaza MD   1 g at 07/07/20 1119    ferrous sulfate (IRON 325) tablet 325 mg  325 mg Oral BID  Liss Arriaza MD   325 mg at 07/07/20 0838    furosemide (LASIX) 100 mg in sodium chloride 0.9 % 100 mL infusion  5 mg/hr Intravenous Continuous Simone Araya MD   Stopped at 07/07/20 1011    sodium chloride flush 0.9 % injection 10 mL  10 mL Intravenous 2 times per day Desire Walton MD   10 mL at 07/07/20 0838    sodium chloride flush 0.9 % injection 10 mL  10 mL Intravenous PRN Desire Walton MD   10 mL at 07/06/20 0840    acetaminophen (TYLENOL) tablet 650 mg  650 mg Oral Q6H PRN Desire Walton MD        Or    acetaminophen (TYLENOL) suppository 650 mg  650 mg Rectal Q6H PRN Lise Ulloa MD        polyethylene glycol [facility]) packet 17 g  17 g Oral Daily PRN Lise Ulloa MD        promethazine (PHENERGAN) tablet 12.5 mg  12.5 mg Oral Q6H PRN Lise Ulloa MD        Or    ondansetron [facility]) injection 4 mg  4 mg Intravenous Q6H PRN Lise Ulloa MD   4 mg at 07/06/20 2050    pantoprazole (PROTONIX) injection 40 mg  40 mg Intravenous Daily Lise Ulloa MD   40 mg at 07/07/20 8923    And    sodium chloride (PF) 0.9 % injection 10 mL  10 mL Intravenous Daily Lise Ulloa MD   10 mL at 07/07/20 0839    norepinephrine (LEVOPHED) 16 mg in dextrose 5% 250 mL infusion  10 mcg/min Intravenous Continuous Lise Ulloa MD   Stopped at 07/07/20 1041    propofol injection  10 mcg/kg/min Intravenous Titrated Bryant Meredith MD   Stopped at 07/06/20 1035    apixaban (ELIQUIS) tablet 2.5 mg  2.5 mg Oral BID Bryant Meredith MD   2.5 mg at 07/07/20 8289    midazolam (VERSED) injection 2 mg  2 mg Intravenous Q4H PRN Bryant Meredith MD        fentaNYL 5 mcg/ml in 0.9%  ml infusion  25 mcg/hr Intravenous Continuous Bryant Meredith MD   Stopped at 07/06/20 1035    isosorbide dinitrate (ISORDIL) tablet 10 mg  10 mg Oral TID Bg Benton DO   10 mg at 07/07/20 0567    [Held by provider] spironolactone (ALDACTONE) tablet 25 mg  25 mg Oral Q MWF Bg Benton DO   25 mg at 07/01/20 2000    aspirin chewable tablet 81 mg  81 mg Oral Daily Kd Akers MD   81 mg at 07/07/20 0838    ipratropium-albuterol (DUONEB) nebulizer solution 3 mL  3 mL Inhalation Q4H Kd Akers MD   3 mL at 07/07/20 0840    levothyroxine (SYNTHROID) tablet 112 mcg  112 mcg Oral Daily Kd Akers MD   112 mcg at 07/07/20 0518    perflutren lipid microspheres (DEFINITY) injection 1.65 mg  1.5 mL Intravenous ONCE PRN Bg Benton,          Facility-Administered Medications Ordered in Other norepinephrine) also reflecting the hemodynamic effect of HFrEF with possible cardiorenal syndrome.     -Cr stable at present; continue to monitor  2. Acute hypoxic respiratory failure, in setting of cardiac arrest, now extubated; on BIPAP mask   -Gradual wean; possible extubation today  3.s/p Cardiac arrest in setting of type II  MI   -OK to start entresto    -continue with lasix drip; will decrease dose in view of hypotensive episode this am     4. .Anemia: Could be related to chronic kidney disease.  Transfuse as needed.  Patient is on Eliquis; moonitor.      5. Aspiration pneumonia; GNR in sputum   -On Ceftriaxone    D/W Resident     Milla Boswell MD

## 2020-07-07 NOTE — PROGRESS NOTES
Pulmonary Progress Note  7/7/2020 2:20 PM  Subjective:   Admit Date: 6/29/2020  PCP: Kyara Brooks MD  Interval History: Resting on CPAP appears comfortable FiO2 at 50%    Diet: DIET TUBE FEED CONTINUOUS/CYCLIC NPO; STANDARD WITH FIBER; Nasogastric; 10; 55  SOB is: None  Cough: None  Wheezing: None  chest pain: None    Data:   Scheduled Meds:    sacubitril-valsartan  0.5 tablet Oral BID    mineral oil-hydrophilic petrolatum   Topical BID    carvedilol  1.5625 mg Oral BID WC    cefTRIAXone (ROCEPHIN) IV  1 g Intravenous Q24H    ferrous sulfate  325 mg Oral BID WC    sodium chloride flush  10 mL Intravenous 2 times per day    pantoprazole  40 mg Intravenous Daily    And    sodium chloride (PF)  10 mL Intravenous Daily    apixaban  2.5 mg Oral BID    isosorbide dinitrate  10 mg Oral TID    [Held by provider] spironolactone  25 mg Oral Q MWF    aspirin  81 mg Oral Daily    ipratropium-albuterol  3 mL Inhalation Q4H    levothyroxine  112 mcg Oral Daily       Continuous Infusions:    DOPamine Stopped (07/07/20 1256)    furosemide (LASIX) 1mg/ml infusion 2.5 mg/hr (07/07/20 1410)    norepinephrine Stopped (07/07/20 1041)    propofol Stopped (07/06/20 1035)    fentaNYL 5 mcg/ml in 0.9%  ml infusion Stopped (07/06/20 1035)       PRN Meds: mineral oil-hydrophilic petrolatum, sodium chloride flush, acetaminophen **OR** acetaminophen, polyethylene glycol, promethazine **OR** ondansetron, midazolam, perflutren lipid microspheres    I/O last 3 completed shifts:   In: 266 [P.O.:25; I.V.:241]  Out: 1325 [Urine:1325]    I/O this shift:  In: -   Out: 832 [Urine:832]      Intake/Output Summary (Last 24 hours) at 7/7/2020 1420  Last data filed at 7/7/2020 1400  Gross per 24 hour   Intake 266 ml   Output 1422 ml   Net -1156 ml       Patient Vitals for the past 96 hrs (Last 3 readings):   Weight   07/07/20 0511 133 lb 14.4 oz (60.7 kg)   07/06/20 0600 138 lb 14.2 oz (63 kg)   07/05/20 0603 141 lb 7 oz (64.2 kg) Recent Labs     20  0530 20  0450 20  1040   WBC 7.3 7.9 9.0   HGB 8.7* 8.1* 8.2*   HCT 28.0* 26.7* 27.9*   MCV 82.4 84.5 88.0    156 152     Recent Labs     20  0530  20  0450 20  1826 20  0436      < > 143 144 142   K 3.7   < > 3.8 4.0 3.9      < > 100 102 98   CO2 27   < > 29 29 28   BUN 55*   < > 49* 49* 52*   CREATININE 2.0*   < > 1.9* 1.9* 1.9*   PHOS 3.4  --  4.1  --   --     < > = values in this interval not displayed. Recent Labs     20  0530 20  0450 20  0436   PROT 6.0* 6.2* 6.6   ALKPHOS 70 67 73   ALT 13 12 14   AST 26 24 28   BILITOT 1.2 1.5* 1.6*     No results for input(s): INR, APTT in the last 72 hours. Recent Labs     20  1720   TROPONINI 0.19*     No results for input(s): BNP in the last 72 hours. Troponin:   Recent Labs     20  1720   TROPONINI 0.19*     CPK:  Lab Results   Component Value Date    CKTOTAL 66 2020          -----------------------------------------------------------------  RAD:   Results for orders placed during the hospital encounter of 20   XR CHEST PORTABLE    Narrative Patient MRN: [id number]  : 1929  Age:  80 years  Gender: Male  Order Date: 2020 10:30 AM  Exam: XR CHEST PORTABLE  Indication:   Pleural effusion   Pleural effusion   Comparison: Chest radiograph, 7/3/2020     FINDINGS:    Bibasilar pulmonary opacities likely representing consolidations. Small-to-moderate right and small left pleural effusions. The  cardiomediastinal contour is unremarkable. No pneumothorax is seen. The endotracheal tube is well positioned, with the tip approximately  4.8 cm above the sarwat. Tip of the nasogastric tube overlies the  stomach. Tip of the right IJ central venous catheter overlies the  distal SVC. Impression 1. No significant interval change as of yesterday. 2. Left basilar pulmonary consolidations concerning for pneumonia.   3. Small to cc removed 7/3 thoracentesis exudative  4. Klebsiella pneumonia on ceftriaxone  5. EF 38% RSVF mildly reduced RSVP 54  6. recurrent persistent atrial flutter-fibrillation s/p CV 1 year ago. 7. Anticoagulation restarted   8. CAD   9. stage 3 chronic kidney disease   10. Anemia  11. GERD   12. Severe protein-calorie malnutrition      Plan:   1. Wean O2  2.  Still need to work-up the cause of syncope and arrest     Shari Reed,[facility]

## 2020-07-07 NOTE — PROGRESS NOTES
Palliative Care Department  Palliative Care Progress Note  Provider: [address] [place] [address] Day: 9    Referring Provider:  Dr. Kalie Garces    Reason for Consult:  [x]  Code status Discussion  [x]  Assist with goals of care  [x]  Psychosocial support  []  Symptom Management  []  Advanced Care Planning    Chief Complaint: Tj Rubio is a 80 y.o. male with chief complaint of syncope, respiratory failure, cardiac arrest    Assessment/Plan      Active Hospital Problems    Diagnosis Date Noted    Severe protein-calorie malnutrition ([facility] [E43] 07/03/2020    Syncope and collapse [R55] 06/29/2020       Respiratory failure/Cardiopulmonary arrest:   -  Per ICU/Pulm   -  Mechanically ventilated   -  Weaning as able   - early mobilization per ICU team/PT/OT    Syncope:   -  Cardiology following closely    Palliative Care Encounter/Recommendations:      - Goals of care: live longer, improve or maintain function/quality of life, preserve independence/autonomy/control and continue current management      - Code Status: limited code- no intubation, no compressions, no resuscitative medications: Spoke with Daren Han and Adriano, who both agree that patient would not benefit from CPR/intubation in the event of another arrest.  Both agree with continuing current level of care with Bipap and ICU treatment. Subjective:     HPI:  Tj Rubio is a 80 y.o. male with significant past medical history of Anemia, Aortic Anuerysm, CAD, HPN, Thyroid disease. The patient has been noted to experience persistent fatigue and dyspnea hence consulted with his internist. While in the clinic, his daughter witnessed the patient lose consciousness and slump on his chair. The patient was then immediately brought to the emergency department. Workup showed elevated Troponin (0.09), D-Dimer (972) and BNP. He also had Pleural effusion on x-ray.  He was admitted under the care of his primary internist and was subsequently referred to cardiology for workup of his syncopal episode. Cardiology ordered a Lung perfusion scan and doppler US of the lower extremity to investigate the elevated d-dimer  He was negative for lower extremity thrombi. Patient was also referred to Pulmonology for the pleural effusion. Over the course of his hospital stay patient was stable but had fatigue. On his 5th day of admission the patient was found by his nurse having difficult of breathing until he eventually became unresponsive with no pulse hence code blue was called. Chest compressions were immediately started. ROSC was achieved after 6 minutes. Medications given include: 1 mg epinephrine, 1 gm Cacl, 10 units Insulin, half amp of d50, 50 mEq bicarb, 2 versed. ABG obtained revealed: pH 7.19, CO2 75.6, O2 56.3, HCO3 28.7 K 5.8 Patient was intubated and transferred to the ICU. Palliative medicine has been consulted to assist with goals of care, code status, and family support. Subjective/Events/Discussions:  7/4/2020:  Mr. Charlie Espinosa was seen at the bedside, no family present. He is mechanically ventilated and sedated, though does wake some with stimulation. He is on levophed drip as well as lasix drip. Will try and meet with family for support and discuss goals/code as able. 7/5/2020:   Ms. Charlie Espinosa seen at the bedside, and he continues to be mechanically ventilated, sedated, and continues with vasopressor support as well as diuretic therapy. He is waking some, and mechanical ventilator weaning continues as per the ICU team.  I met with the patient's daughter Donna Cobos, who states she is the medical power of . Patient lives with his son Anayeli Smith locally, the daughter lives in [place], but travels here frequently to visit and continue to provide support for her father. I introduced myself, palliative medicine, and I discussed the role palliative medicine would have in her father's care. Much time was spent providing support.   We discussed medical history her father. Daughter plans to talk with son tonight regarding code status and will talk again tomorrow. She is going to have her son scan and email the [facility] papers which she has at home in [facility]. 7/7/2020:   Spoke with Eunice Kwon and Sukumar Egan. Sukumar Egan has POA paperwork which her family in [place] emailed to her. She does not want CPR/intubation in the event of another arrest.  Patient had syncopal event this morning when PT got patient up into the chair. Patient had been complaining of back pain. Per daughter Cris High request- given recurrent syncope and risk for further deterioration- spoke with marta on the phone, who also agrees to no further CPR/intubation given risk of anoxic brain injury. DNR-CCA with no intubation status order placed. Emotional support offered to family. Allergies   Allergen Reactions    Reglan [Metoclopramide] Other (See Comments)     tremors    Other      Flu Vaccine  Pneumococcal Vaccines       ROS: Pt.  Unable to participate    Objective:     Physical Exam  BP (!) 104/55   Pulse 75   Temp 97 °F (36.1 °C) (Bladder)   Resp 21   Ht 5' 6\" (1.676 m)   Wt 133 lb 14.4 oz (60.7 kg)   SpO2 100%   BMI 21.61 kg/m²     Gen:  Elderly, sedated, in no acute distress  HEENT:  Normocephalic, conjunctiva pink, no drainage, mucosa moist  Neck:  Supple  Lungs:  Mechanically ventilated, CTA bilaterally, no audible rhonchi or wheezes noted  Heart[de-identified] RRR, no murmur, rub, or gallop noted during exam  Abd:  Soft, non tender, non distended, BS+  :  gaston  M/S/Ext:  no edema, pulses present  Skin:  Warm and dry  Neuro:  PERRL, sedated    Current Medications:  Inpatient medications reviewed: yes  Home Medications reviewed: yes    Results/Verification of Data Review  Objective data reviewed: labs, images, records, medication use, vitals and chart      - Advanced Directives: unkown   -Surrogate/Legal NOK: Child    Contacts:  Eunice Kwon [phone]) and Sukumar Egan [phone])    - Spiritual assessment: No spiritual distress identified     - Bereavement and grief: to be determined    - Discharge planning: to be determined    - Prognosis: unknown    - Referrals to: none today    Time/Communication  Greater than 50% of time spent, total 35 minutes in counseling and coordination of care at the bedside regarding goals of care, diagnosis and prognosis and see above. [address] [facility]    Discussed patient and the plan of care with the other IDT members of Palliative Care, and with floor nurse. Thank you for allowing Palliative Medicine to participate in the care of Jett Singh.

## 2020-07-07 NOTE — PLAN OF CARE
Problem: Falls - Risk of:  Goal: Will remain free from falls  Description: Will remain free from falls  Outcome: Met This Shift  Goal: Absence of physical injury  Description: Absence of physical injury  Outcome: Met This Shift     Problem: Skin Integrity:  Goal: Absence of new skin breakdown  Description: Absence of new skin breakdown  Outcome: Met This Shift     Problem: Musculor/Skeletal Functional Status  Goal: Absence of falls  Outcome: Met This Shift

## 2020-07-08 NOTE — PROGRESS NOTES
Nephrology Attending   Inpatient Progress Note    Admit Date: 2020                                  PCP: Munir Grigsby MD    Patient Active Problem List   Diagnosis    Anemia    Hypertension    CAD (coronary artery disease)    GERD (gastroesophageal reflux disease)    Former smoker, stopped smoking many years ago    [facility]    CHF (congestive heart failure), NYHA class I, acute on chronic, combined ([facility]    Acute on chronic congestive heart failure ([facility]    TIA (transient ischemic attack)    Chronic diastolic (congestive) heart failure (HCC)    Stage 3 chronic kidney disease ([facility]    Syncope and collapse    Severe protein-calorie malnutrition ([facility]       Subjective:     : No acute issues from overnight; remains intubated  : episode of hypotension and tachycardia this am, with SOB responded ; placed back on BIPAP  : Hypotensive episode this am; lasix drip stopped         Vitals:  VITALS:  /62   Pulse 93   Temp 98.1 °F (36.7 °C) (Temporal)   Resp 19   Ht 5' 6\" (1.676 m)   Wt 137 lb (62.1 kg)   SpO2 100%   BMI 22.11 kg/m²   24HR INTAKE/OUTPUT:      Intake/Output Summary (Last 24 hours) at 2020 1111  Last data filed at 2020 0720  Gross per 24 hour   Intake 286.55 ml   Output 915 ml   Net -628.45 ml     CURRENT PULSE OXIMETRY:  SpO2: 100 %  24HR PULSE OXIMETRY RANGE:  SpO2  Av.7 %  Min: 94 %  Max: 100 %        I/O:      I/O last 3 completed shifts: In: 236.6 [I.V.:236.6]  Out: 5966 [[facility]:8207]  I/O this shift:   In: 48 [IV Piggyback:50]  Out: -     Medications:    IV:   furosemide (LASIX) 1mg/ml infusion Stopped (20 1027)      sacubitril-valsartan  0.5 tablet Oral BID    mineral oil-hydrophilic petrolatum   Topical BID    carvedilol  1.5625 mg Oral BID WC    cefTRIAXone (ROCEPHIN) IV  1 g Intravenous Q24H    ferrous sulfate  325 mg Oral BID WC    sodium chloride flush  10 mL Intravenous 2 times per day    pantoprazole  40 mg hold    Son at bedside and updated      D/W Resident     Annette Wan MD

## 2020-07-08 NOTE — PROGRESS NOTES
[address] [facility]  Department of Internal Medicine   Internal Medicine Residency   MICU Progress Note    Patient:  Tk Melgar 80 y.o. male  MRN: [id number]     Date of Service: 7/8/2020    Allergy: Reglan [metoclopramide] and Other    Subjective       Patient was seen at bedside in am    Patient stable overnight   In the a.m. patient began desaturating with low blood pressure   Initially placed on BiPAP but quickly transitioned to nasal cannula   Levophed was started   IVC looks compressible   Patient given 500 cc IVF and albumin   BP remains low follow-up NICOM    Objective   I & O - 24hr:     Intake/Output Summary (Last 24 hours) at 7/8/2020 1706  Last data filed at 7/8/2020 1600  Gross per 24 hour   Intake 282.94 ml   Output 600 ml   Net -317.06 ml       Vitals: BP (!) 93/58   Pulse 90   Temp 97 °F (36.1 °C) (Bladder)   Resp 22   Ht 5' 6\" (1.676 m)   Wt 137 lb (62.1 kg)   SpO2 99%   BMI 22.11 kg/m²        Constitutional: Awake alert and oriented X3   Head: Normocephalic and atraumatic.  Eyes:  PERRL. conjunctiva normal, (-) scleral icterus. Mucus membranes moist.   Mouth: Intubated.  Neck: (-)  swelling, (-) JVD   · Respiratory: Lungs clear to auscultation bilaterally. (-)  wheezes, (-)  rales, (-)  rhonchi. · Cardiovascular: RRR. (-)  murmurs, (-) gallops,  (-) rubs. S1 and S2 were normal.   · GI:  Abdomen soft, non-tender, non-distended. (+) BS. (-)  rebound, (-) guarding, (-) rigidity. · Extremities: Warm and well perfused. (-) clubbing, (-) cyanosis. 2+ distal pulses. (+)+2 peripheral edema. + sacral edema   · Neurologic: Cranial nerves Grossly intact follows commands      Lines     Site Date/Day    Art line   None    TLC R IJ 4   PICC None    Hemoaccess None      ABG:     Lab Results   Component Value Date    PH 7.330 07/07/2020    PCO2 51.4 07/07/2020    PO2 205.2 07/07/2020    HCO3 26.5 07/07/2020    BE 0.1 07/07/2020    THB 9.8 07/07/2020    O2SAT 99.1 07/07/2020 MD Sugey        promethazine (PHENERGAN) tablet 12.5 mg  12.5 mg Oral Q6H PRN Oscar Benavidez MD        Or    [facility]ron [facility]) injection 4 mg  4 mg Intravenous Q6H PRN Oscar Benavidez MD   4 mg at 07/06/20 2050    pantoprazole (PROTONIX) injection 40 mg  40 mg Intravenous Daily Oscar Benavidez MD   40 mg at 07/08/20 6794    And    sodium chloride (PF) 0.9 % injection 10 mL  10 mL Intravenous Daily Oscar Benavidez MD   10 mL at 07/08/20 0817    [Held by provider] apixaban (ELIQUIS) tablet 2.5 mg  2.5 mg Oral BID Cassius Cushing, MD   Stopped at 07/08/20 0949    midazolam (VERSED) injection 2 mg  2 mg Intravenous Q4H PRN Cassius Cushing, MD        isosorbide dinitrate (ISORDIL) tablet 10 mg  10 mg Oral TID Ashley Marinelli DO   Stopped at 07/08/20 1347    [Held by provider] spironolactone (ALDACTONE) tablet 25 mg  25 mg Oral Q MWF Ashley Marinelli DO   25 mg at 07/01/20 2000    aspirin chewable tablet 81 mg  81 mg Oral Daily Crow Ashraf MD   Stopped at 07/08/20 0950    ipratropium-albuterol (DUONEB) nebulizer solution 3 mL  3 mL Inhalation Q4H Crow Ashraf MD   3 mL at 07/08/20 1247    levothyroxine (SYNTHROID) tablet 112 mcg  112 mcg Oral Daily Crow Ashraf MD   112 mcg at 07/08/20 9512    perflutren lipid microspheres (DEFINITY) injection 1.65 mg  1.5 mL Intravenous ONCE PRN Ashley Marinelli DO         Facility-Administered Medications Ordered in Other Encounters   Medication Dose Route Frequency Provider Last Rate Last Dose    iohexol (OMNIPAQUE 240) injection 50 mL  50 mL Oral ONCE PRN Basem A Ashleigh        ioversol (OPTIRAY) 68 % injection 120 mL  120 mL Intravenous ONCE PRN Basem A Ashleigh            Labs   CBC with Differential:    Lab Results   Component Value Date    WBC 9.4 07/08/2020    RBC 3.12 07/08/2020    HGB 8.1 07/08/2020    HCT 27.0 07/08/2020     07/08/2020    MCV 86.5 07/08/2020    MCH 26.0 07/08/2020    MCHC 30.0 07/08/2020    RDW thoracentesis. There is decreased left-sided pleural effusion. No pneumothorax is seen. Persistent mid and lower lung consolidations concerning for pneumonia are again noted. Small to moderate right and small left pleural effusions noted. The endotracheal tube is well positioned, with the tip approximately 4.4 cm above the sarwat. Tip of the nasogastric tube is in the proximal stomach. Tip of the right IJ central venous catheter overlies the  distal SVC. 1. Status post left thoracentesis. No pneumothorax. 2. Small-to-moderate right and small left pleural effusion. 3. Prominent bilateral pulmonary opacities concerning for pneumonia. 4. Tip of the NG tube is in the proximal stomach. The side-port is at the gastroesophageal junction. Advancement recommended. 5. ET tube and right IJ CVC appear well-positioned. Xr Chest Portable    Result Date: 7/3/2020  Patient MRN: [id number] : 1929 Age:  80 years Gender: Male Order Date: 7/3/2020 5:15 AM Exam: XR CHEST PORTABLE Number of Images: 1 view Indication:   IJ placement IJ placement Comparison: July 3, 2020, 0318 hours. FINDINGS: New right-sided central venous catheter terminates in the SVC. Endotracheal and nasogastric tubes are stable. Bilateral airspace disease is stable and extensive. There may be layering pleural effusions as well. New central venous catheter on the right. Extensive bilateral airspace disease and possible layering pleural effusions as. Xr Chest Portable    Result Date: 7/3/2020  Patient MRN: [id number] : 1929 Age:  80 years Gender: Male Order Date: 7/3/2020 3:15 AM Exam: XR CHEST PORTABLE Number of Images: 1 view Indication:   intubation intubation Comparison: July 3, 2020 FINDINGS: Stable endotracheal and nasogastric tubes. Extensive bilateral airspace disease does not appear appreciably changed. There are likely layering effusions, especially on the left. Stable extensive bilateral airspace disease.  Likely pleural lactic acid: Resolved    Plan   · Thoracentesis tomorrow  · BiPAP if needed for hypoxia  · Continue levo fed  · Follow-up NICOM  · Hold Eliquis  · Case discussed with nephrology  · Will receive 25 g albumin  · Follow-up lactic acid  · Hold Lasix drip and antihypertensive medications    PT/OT evaluation:  Not indicated at this time    DVT prophylaxis/ GI prophylaxis: eliquis  / Protonix    Disposition: MUKESH Nick MD, PGY-2  Attending physician: Dr. Adriane Hanson    I personally saw, examined and provided care for the patient. Radiographs, labs and medication list were reviewed by me independently. Review of Residents documentation was conducted and revisions were made as appropriate. I agree with the above documented exam, problem list and plan of care.         CCT excluding procedures 35 minutes    Electronically signed by Christiano Schultz DO on 7/8/2020 at 10:39 PM

## 2020-07-08 NOTE — PROGRESS NOTES
Patient examined at bedside, easily arousable, unlabored respirations on bipap, following commands. Per nursing required bipap overnight for low SaO2. Code status was changed to [facility] with no intubation yesterday. No needs identified today. Will continue to follow along, available if needed.

## 2020-07-08 NOTE — PROGRESS NOTES
[facility] Quality Flow/Interdisciplinary Rounds Progress Note        Quality Flow Rounds held on July 8, 2020    Disciplines Attending:  Bedside nurse, charge nurse, , PT/OT, pharmacy, nursing unit leadership, , Christos Renee was admitted on 6/29/2020  3:08 PM    Anticipated Discharge Date:  Expected Discharge Date: 07/03/20    Disposition:    Eric Score:  Eric Scale Score: 14    Readmission Risk              Risk of Unplanned Readmission:        27           Discussed patient goal for the day, patient clinical progression, and barriers to discharge. The following Goal(s) of the Day/Commitment(s) have been identified:  Continue icu care.       [facility]  July 8, 2020

## 2020-07-08 NOTE — PROGRESS NOTES
Date: 7/8/2020    Time: 12:15 AM    Patient Placed On BIPAP/CPAP/ Non-Invasive Ventilation? Yes    If no must comment. Facial area red/color change? Yes           If YES are Blister/Lesion present? No   If yes must notify nursing staff  BIPAP/CPAP skin barrier? Yes    Skin barrier type:duoderm       Comments:         Malinda Parekh

## 2020-07-08 NOTE — PROGRESS NOTES
Date: 7/8/2020    Time: 9:13 AM    Patient Placed On BIPAP/CPAP/ Non-Invasive Ventilation? No    If no must comment. On from previous shift  Facial area red/color change? No           If YES are Blister/Lesion present? No   If yes must notify nursing staff  BIPAP/CPAP skin barrier?   Yes    Skin barrier type:duoderm       Comments:        Rod Dietrich

## 2020-07-08 NOTE — PROGRESS NOTES
rhonchi, no expiratory wheezes, no decreased tactile fremitus without inspiratory rales. Heart: . Irregular, regular rhythm; S1 > S2, no gallop or murmur. No clicks, rub, palpable thrills   or heaves. PMI nondisplaced, 5th intercostal space MCL. Abdomen: Soft, nontender, nondistended,  mildly protuberant, no masses or organomegaly. Bowel sounds active. Extremities: Without clubbing, cyanosis or edema. Pulses present 3+ upper extermities bilaterally; present 1+ DP and present 1+ PT bilaterally. Data:   Scheduled Meds: Reviewed  Continuous Infusions:    furosemide (LASIX) 1mg/ml infusion 2.5 mg/hr (07/07/20 2011)       Intake/Output Summary (Last 24 hours) at 7/7/2020 2357  Last data filed at 7/7/2020 2200  Gross per 24 hour   Intake 261.48 ml   Output 1352 ml   Net -1090.52 ml     CBC:   Recent Labs     07/05/20  0530 07/06/20  0450 07/07/20  1040   WBC 7.3 7.9 9.0   HGB 8.7* 8.1* 8.2*   HCT 28.0* 26.7* 27.9*    156 152     BMP:  Recent Labs     07/05/20  0530 07/05/20  1810 07/06/20  0450 07/06/20  1826 07/07/20  0436 07/07/20  1821    146 143 144 142 144   K 3.7 4.0 3.8 4.0 3.9 3.8    101 100 102 98 102   CO2 27 30* 29 29 28 27   BUN 55* 51* 49* 49* 52* 56*   CREATININE 2.0* 2.0* 1.9* 1.9* 1.9* 1.9*   LABGLOM 38 38 40 40 40 40     ABGs:   Lab Results   Component Value Date    PH 7.330 07/07/2020    PO2 205.2 07/07/2020    PCO2 51.4 07/07/2020     INR:   No results for input(s): INR in the last 72 hours. PRO-BNP:   Lab Results   Component Value Date    PROBNP 26,915 (H) 07/03/2020    PROBNP 34,147 (H) 06/29/2020      TSH:   Lab Results   Component Value Date    TSH 12.360 (H) 07/03/2020      Cardiac Injury Profile:   No results for input(s): CKTOTAL, CKMB, TROPONINI in the last 72 hours.    Lipid Profile: No results found for: TRIG, HDL, LDLCALC, CHOL   Hemoglobin A1C: No components found for: HGBA1C     RAD:   Xr Chest Standard (2 Vw)    Result Date: 6/29/2020  Patient MRN: [id number] : 1929 Age:  80 years Gender: Male Order Date: 2020 5:15 PM Exam: XR CHEST (2 VW) Number of Images: 2 view Indication:   sob sob Comparison: 2019 Findings: There is a suggestion of bilateral pleural effusions The heart is enlarged. Lung fields demonstrate patchy bilateral infiltrates which could be related to pulmonary edema. The aorta is tortuous and ectatic     Cardiomegaly Tortuous aorta There are patchy infiltrates seen throughout both the lung fields. Pulmonary edema could have this appearance There are bilateral pleural effusions present. Nm Lung Scan Perfusion Only    Result Date: 2020  Patient MRN: [id number] : 1929 Age:  80 years Gender: Male Order Date: 2020 10:00 AM Exam: NM LUNG SCAN PERFUSION ONLY Number of Images:   2  views Indication:   syncope and elevated d dimer syncope and elevated d dimer Comparison: 2020 Findings: The patient received 7.9 millicuries of technetium MAA. The chest radiograph demonstrates what appears to be large bilateral infiltrates and effusions. There is relatively limited aeration of the upper lobes Perfusion images reveal segmental perfusion defects. There is a defect in the inferior right lobe possibly pleural effusion and infiltrate. There is a defect in the left lower lobe again possibly effusion and infiltrate     Segmental perfusion defects, in the left lower lobe and right lower lobe. Possible defects from infiltrate and effusions, given the appearance of the chest x-ray. Pulmonary embolism cannot be excluded.  ALERT:  THIS IS AN ABNORMAL REPORT    Us Dup Lower Extremities Bilateral Venous    Result Date: 2020  Patient MRN:  [id number] : 1929 Age: 80 years Gender: Male Order Date:  2020 6:45 PM EXAM: US DUP LOWER EXTREMITIES BILATERAL VENOUS INDICATION:  Elevated d dimer syncope concern for DVT leg pain Elevated d dimer syncope concern for DVT leg pain  COMPARISON: None FINDINGS: Normal color flow is seen in the veins, which demonstrate normal compressibility and augmentation. No evidence of deep venous thrombosis. EKG: See Report  Echo: 7/3/2020      Summary   Left ventricle grossly normal in size. Mild left ventricular concentric hypertrophy noted. Prominent papillary muscle. Global hypokinesis is noted to be moderate. Estimated left ventricular ejection fraction is 38±5%. Diastolic function could not be accurately assessed. The LAESV Index is >34 ml/m2. Mild-moderately dilated right ventricle. TAPSE decreased 0.8. Right ventricle global systolic function is moderately reduced . Physiologic and/or trace mitral regurgitation is present. Trace aortic regurgitation is noted. Mild tricuspid regurgitation. RVSP is 54 mmHg. Pulmonary hypertension is moderate   Physiologic and/or trace pulmonic regurgitation present. Technically good quality study. Compared to prior echo, changes noted. IMPRESSIONS:  Active Problems:    Syncope and collapse    Severe protein-calorie malnutrition (HCC)  Resolved Problems:    * No resolved hospital problems. *      RECOMMENDATIONS:  Wean from ventilator as tolerated. Fortunately blood pressure remains stable presently. Continues to have significant diuresis and renal function remained stable presently. Importantly, he has not sustained any significant cardiac arrhythmia since the time of his arrest. Will defer initiation of low-dose sacubitril/valsartan after discussing further with Dr. Aimee Banda. I have spent more than  30 critical care minutes face to face with Jonetta Schaumann and reviewing notes and laboratory data, with greater than 50% of this time instructing and counseling the patient's  son regarding my findings and recommendations and I have answered all questions as posed to me by Mr. Kellie Guan son    Helen Lloyd, DO FACP,FACC,FSCAI      NOTE:  This report was transcribed using voice recognition software.   Every effort was made to ensure accuracy; however, inadvertent computerized transcription errors may be present

## 2020-07-08 NOTE — PROGRESS NOTES
Pulmonary Progress Note  7/8/2020 2:46 PM  Subjective:   Admit Date: 6/29/2020  PCP: Adriel Sherwood MD  Interval History: Failed  swallow study NG tube replaced    Diet: DIET TUBE FEED CONTINUOUS/CYCLIC NPO; STANDARD WITH FIBER; Nasogastric; 10; 55  SOB is: Mild  Cough: None  Wheezing: None  chest pain: None    Data:   Scheduled Meds:    midodrine  5 mg Oral TID WC    albumin human  25 g Intravenous Once    sodium chloride  500 mL Intravenous Once    [Held by provider] sacubitril-valsartan  0.5 tablet Oral BID    mineral oil-hydrophilic petrolatum   Topical BID    [Held by provider] carvedilol  1.5625 mg Oral BID WC    cefTRIAXone (ROCEPHIN) IV  1 g Intravenous Q24H    ferrous sulfate  325 mg Oral BID WC    sodium chloride flush  10 mL Intravenous 2 times per day    pantoprazole  40 mg Intravenous Daily    And    sodium chloride (PF)  10 mL Intravenous Daily    apixaban  2.5 mg Oral BID    isosorbide dinitrate  10 mg Oral TID    [Held by provider] spironolactone  25 mg Oral Q MWF    aspirin  81 mg Oral Daily    ipratropium-albuterol  3 mL Inhalation Q4H    levothyroxine  112 mcg Oral Daily       Continuous Infusions:    norepinephrine Stopped (07/08/20 1400)    [Held by provider] furosemide (LASIX) 1mg/ml infusion Stopped (07/08/20 1027)       PRN Meds: mineral oil-hydrophilic petrolatum, sodium chloride flush, acetaminophen **OR** acetaminophen, polyethylene glycol, promethazine **OR** ondansetron, midazolam, perflutren lipid microspheres    I/O last 3 completed shifts: In: 236.6 [I.V.:236.6]  Out: 5436 [DPVOF:4271]    I/O this shift:   In: 95 [I.V.:45; IV Piggyback:50]  Out: 175 [Urine:175]      Intake/Output Summary (Last 24 hours) at 7/8/2020 1446  Last data filed at 7/8/2020 1400  Gross per 24 hour   Intake 331.55 ml   Output 665 ml   Net -333.45 ml       Patient Vitals for the past 96 hrs (Last 3 readings):   Weight   07/08/20 0600 137 lb (62.1 kg)   07/08/20 0000 137 lb (62.1 kg) 20 0511 133 lb 14.4 oz (60.7 kg)         Recent Labs     20  0450 20  1040 20  0550   WBC 7.9 9.0 9.4   HGB 8.1* 8.2* 8.1*   HCT 26.7* 27.9* 27.0*   MCV 84.5 88.0 86.5    152 174     Recent Labs     20  0450  20  1821 20  0550 20  1015      < > 144 145 147*   K 3.8   < > 3.8 3.8 5.0      < > 102 100 102   CO2 29   < > 27 27 27   BUN 49*   < > 56* 60* 63*   CREATININE 1.9*   < > 1.9* 2.1* 2.2*   PHOS 4.1  --   --   --   --     < > = values in this interval not displayed. Recent Labs     20  0450 20  0436 20  0550   PROT 6.2* 6.6 6.1*   ALKPHOS 67 73 69   ALT 12 14 12   AST 24 28 25   BILITOT 1.5* 1.6* 1.8*     CPK:  Lab Results   Component Value Date    CKTOTAL 66 2020          -----------------------------------------------------------------  RAD:   Results for orders placed during the hospital encounter of 20   XR CHEST PORTABLE    Narrative Patient MRN: [id number]  : 1929  Age:  80 years  Gender: Male  Order Date: 2020 10:30 AM  Exam: XR CHEST PORTABLE  Indication:   Pleural effusion   Pleural effusion   Comparison: Chest radiograph, 7/3/2020     FINDINGS:    Bibasilar pulmonary opacities likely representing consolidations. Small-to-moderate right and small left pleural effusions. The  cardiomediastinal contour is unremarkable. No pneumothorax is seen. The endotracheal tube is well positioned, with the tip approximately  4.8 cm above the sarwat. Tip of the nasogastric tube overlies the  stomach. Tip of the right IJ central venous catheter overlies the  distal SVC. Impression 1. No significant interval change as of yesterday. 2. Left basilar pulmonary consolidations concerning for pneumonia. 3. Small to moderate right and small left pleural effusions 4. The  life support lines and tubes appear well positioned. Micro:     Additional Respiratory  Assessments  Pulse: 95  Resp: 23  SpO2: 100 %  Position: Semi-Gregory's  Humidification Source: Heated wire  Humidification Temp: 37  Circuit Condensation: Drained  Oral Care: Mouth suctioned, Mouth swabbed  Subglottic Suction Done?: Yes  Airway Type: ET  Airway Size: 8  Cuff Pressure (cm H2O): (MLT)  Skin barrier applied: Yes    Objective:   Vitals:   Vitals:    20 1430   BP: (!) 87/58   Pulse:    Resp:    Temp:    SpO2:       TEMP:Current: Temp: 98.4 °F (36.9 °C)  Max: Temp  Av.4 °F (36.3 °C)  Min: 95.3 °F (35.2 °C)  Max: 98.4 °F (36.9 °C)    BP Range: Systolic (88SKM), ISP:25 , Min:72 , RAY:963     Diastolic (92ZFA), CDP:83, Min:48, Max:74    General appearance: alert, appears stated age and cooperative  In no acute distress On the cannula O2 very weak phonation  Skin: No rashes or lesions  HEENT: mucous membranes are moist NG tube in place  Neck: No JVD  Lungs: symmetrical expansion, decreased breath sounds right side  to auscultation, no use of accessory muscles  Heart: S1S2 no murmurs,  Abdomen: soft, non tender,   Extremities: no peripheral edema  Neurologic: Alert, oriented times 3,  Affect: Calm hands restrained     Saturating 100% on BiPAP with 40% FiO2  Assessment:   Patient Active Problem List:     81 yo M presents from PCPs office with  H/o HTN, thyroid disease, CAD, A. fib flutter 1 year ago/p CV  where he had a syncope on . Mercedes Saldana had a skin tear over his right lower extremity   on 5 L      x-ray with bilateral effusions   ultrasound lower extremities no DVT   possible perfusion defects in the lower lobes  7/3 cardiac arrest x 6 min.  Able to follow commands on ventilator.  Thoracentesis 1200 cc removed.  Put on Lasix drip EF 38%   sedated on propofol fentanyl.  On norepinephrine and Lasix drip.  Chest x-ray showing left base consolidation moderate small effusions   self extubated on nasal cannula oxygen   patient went unresponsive while working with PT OT with blood pressure dropping 50/40 required to be restarted on dopamine and Levophed with improvement. Now on Lasix drip on CPAP   7/8 noted to aspirate on liquids NG replaced Required BIPAP for low sats overnight . Chest x-ray showing right effusion has recurred in    1. 7/8 cardiac arrest 7/3  recurrent episodes of syncope and collapse on presentation  2. S/p respiratory failure  Intubated from 7/3-7/6 now   3. Pleural effusions left 1200 cc removed 7/3 thoracentesis exudative  4. Klebsiella pneumonia on ceftriaxone  5. EF 38% RSVF mildly reduced RSVP 54  6. dysphagia  7. recurrent persistent atrial flutter-fibrillation s/p CV 1 year ago.   8. Anticoagulation  With Elliquis  9. CAD   10. stage 3 chronic kidney disease   11. Anemia  12. GERD   13. Severe protein-calorie malnutrition   14. DNR CCA     Plan:   1. Wean O2  2. Started on Entresto   3. Would hold anticoagulation and arrange for pigtail catheter on the right  4.  Discussed with residents  Ravi Brewster

## 2020-07-09 NOTE — PROGRESS NOTES
PROGRESS NOTE       PATIENT PROBLEM LIST:  Active Problems:    Syncope and collapse    Severe protein-calorie malnutrition (HCC)  Resolved Problems:    * No resolved hospital problems. *      SUBJECTIVE:  Paul Hoffman  remains intubated presently with his son at his bedside. REVIEW OF SYSTEMS:   Unable to accurately obtain secondary to patient's marked decompensated state and presently  sedated and intubated. Arin Josue SCHEDULED MEDICATIONS:   midodrine  5 mg Oral TID WC    [Held by provider] sacubitril-valsartan  0.5 tablet Oral BID    mineral oil-hydrophilic petrolatum   Topical BID    [Held by provider] carvedilol  1.5625 mg Oral BID WC    cefTRIAXone (ROCEPHIN) IV  1 g Intravenous Q24H    ferrous sulfate  325 mg Oral BID WC    sodium chloride flush  10 mL Intravenous 2 times per day    pantoprazole  40 mg Intravenous Daily    And    sodium chloride (PF)  10 mL Intravenous Daily    [Held by provider] apixaban  2.5 mg Oral BID    isosorbide dinitrate  10 mg Oral TID    [Held by provider] spironolactone  25 mg Oral Q MWF    aspirin  81 mg Oral Daily    ipratropium-albuterol  3 mL Inhalation Q4H    levothyroxine  112 mcg Oral Daily       VITAL SIGNS:                                                                                                                          BP (!) 47/0   Pulse (!) 47   Temp 97 °F (36.1 °C) (Bladder)   Resp 19   Ht 5' 6\" (1.676 m)   Wt 137 lb (62.1 kg)   SpO2 (!) 75%   BMI 22.11 kg/m²   Patient Vitals for the past 96 hrs (Last 3 readings):   Weight   07/08/20 0600 137 lb (62.1 kg)   07/08/20 0000 137 lb (62.1 kg)   07/07/20 0511 133 lb 14.4 oz (60.7 kg)     OBJECTIVE:    HEENT:   SABINA ;sclera non-icteric, conjunctiva pink. Carotids are brisk in upstroke with normal contour. No carotid bruits. Elevated jugular venous pulsation at 45°. No palpable cervical nor supraclavicular nodes. Thyroid not palpable. Trachea midline. endotracheal tube present.   Chest: Even Hemoglobin A1C: No components found for: HGBA1C     RAD:   Xr Chest Standard (2 Vw)    Result Date: 2020  Patient MRN: [id number] : 1929 Age:  80 years Gender: Male Order Date: 2020 5:15 PM Exam: XR CHEST (2 VW) Number of Images: 2 view Indication:   sob sob Comparison: 2019 Findings: There is a suggestion of bilateral pleural effusions The heart is enlarged. Lung fields demonstrate patchy bilateral infiltrates which could be related to pulmonary edema. The aorta is tortuous and ectatic     Cardiomegaly Tortuous aorta There are patchy infiltrates seen throughout both the lung fields. Pulmonary edema could have this appearance There are bilateral pleural effusions present. Nm Lung Scan Perfusion Only    Result Date: 2020  Patient MRN: [id number] : 1929 Age:  80 years Gender: Male Order Date: 2020 10:00 AM Exam: NM LUNG SCAN PERFUSION ONLY Number of Images:   2  views Indication:   syncope and elevated d dimer syncope and elevated d dimer Comparison: 2020 Findings: The patient received 7.9 millicuries of technetium MAA. The chest radiograph demonstrates what appears to be large bilateral infiltrates and effusions. There is relatively limited aeration of the upper lobes Perfusion images reveal segmental perfusion defects. There is a defect in the inferior right lobe possibly pleural effusion and infiltrate. There is a defect in the left lower lobe again possibly effusion and infiltrate     Segmental perfusion defects, in the left lower lobe and right lower lobe. Possible defects from infiltrate and effusions, given the appearance of the chest x-ray. Pulmonary embolism cannot be excluded.  ALERT:  THIS IS AN ABNORMAL REPORT    Us Dup Lower Extremities Bilateral Venous    Result Date: 2020  Patient MRN:  [id number] : 1929 Age: 80 years Gender: Male Order Date:  2020 6:45 PM EXAM: US DUP LOWER EXTREMITIES BILATERAL VENOUS INDICATION:  Elevated d dimer syncope concern for DVT leg pain Elevated d dimer syncope concern for DVT leg pain  COMPARISON: None FINDINGS: Normal color flow is seen in the veins, which demonstrate normal compressibility and augmentation. No evidence of deep venous thrombosis. EKG: See Report  Echo: 7/3/2020      Summary   Left ventricle grossly normal in size. Mild left ventricular concentric hypertrophy noted. Prominent papillary muscle. Global hypokinesis is noted to be moderate. Estimated left ventricular ejection fraction is 38±5%. Diastolic function could not be accurately assessed. The LAESV Index is >34 ml/m2. Mild-moderately dilated right ventricle. TAPSE decreased 0.8. Right ventricle global systolic function is moderately reduced . Physiologic and/or trace mitral regurgitation is present. Trace aortic regurgitation is noted. Mild tricuspid regurgitation. RVSP is 54 mmHg. Pulmonary hypertension is moderate   Physiologic and/or trace pulmonic regurgitation present. Technically good quality study. Compared to prior echo, changes noted. IMPRESSIONS:  Active Problems:    Syncope and collapse    Severe protein-calorie malnutrition (HCC)  Resolved Problems:    * No resolved hospital problems. *      RECOMMENDATIONS:  Wean from ventilator as tolerated. Fortunately blood pressure remains stable presently. Continues to have significant diuresis and renal function remained stable presently. Importantly, he has not sustained any significant cardiac arrhythmia since the time of his arrest. Will defer initiation of low-dose sacubitril/valsartan after discussing further with Dr. Felisha Reyes.     I have spent more than  30 critical care minutes face to face with [facility] and reviewing notes and laboratory data, with greater than 50% of this time instructing and counseling the patient's  son regarding my findings and recommendations and I have answered all questions as posed to me by Mr. Alton Jacob son    Mir Ernesto Zenaida Hein, DO FACP,FACC,[facility] [place], [place]      NOTE:  This report was transcribed using voice recognition software.   Every effort was made to ensure accuracy; however, inadvertent computerized transcription errors may be present

## 2020-07-30 NOTE — DISCHARGE SUMMARY
Physician Discharge Summary     Patient ID:  Lora Rodriguez  [id number]  57 y.o.  1929    Admit date: 2020    Discharge date and time: 2020 11:27 PM     Admitting Physician: Escobar Bhakta MD     Discharge Physician: Escobar Bhakta MD      Admission Diagnoses:   Syncope and collapse [R55]    Discharge Diagnoses:   1. Respiratory arrest    Admission Condition: unstable    Discharged Condition: patient     Hospital Course: 79 yo M presented with  H/o HTN, thyroid disease, CAD, A. fib flutter 1 year ago/p CV  where he had a syncope on .  on 5 L      x-ray with bilateral effusions   ultrasound lower extremities no DVT   possible perfusion defects in the lower lobes  7/3 cardiac arrest x 6 min.  Able to follow commands on ventilator.  Thoracentesis 1200 cc removed.  Put on Lasix drip EF 38%   sedated on propofol fentanyl.  On norepinephrine and Lasix drip.  Chest x-ray showing left base consolidation moderate small effusions   self extubated on nasal cannula oxygen   patient went unresponsive while working with PT OT with blood pressure dropping 50/40 required to be restarted on dopamine and Levophed with improvement.  Now on Lasix drip on CPAP    noted to aspirate on liquids NG replaced Required BIPAP for low sats overnight . Chest x-ray showing right effusion has recurred in  Pt ended up coding again and     Consults: nephrology, pulm/critical care    Discharge Exam:  Pt   Disposition: pt     Discharge Medications: pt         Follow-up with Dr. Cielo Green in 1 week, patient to call [phone] for an appointment and if has any problems.       Electronically signed by Escobar Bhakta MD on 20 at 11:48 AM EDT

## 2020-10-23 NOTE — PLAN OF CARE
Problem: Falls - Risk of:  Goal: Will remain free from falls  Description: Will remain free from falls  Outcome: Met This Shift  Goal: Absence of physical injury  Description: Absence of physical injury  Outcome: Met This Shift     Problem: Skin Integrity:  Goal: Will show no infection signs and symptoms  Description: Will show no infection signs and symptoms  Outcome: Met This Shift  Goal: Absence of new skin breakdown  Description: Absence of new skin breakdown  Outcome: Met This Shift Spontaneous, unlabored and symmetrical